# Patient Record
Sex: FEMALE | Race: OTHER | HISPANIC OR LATINO
[De-identification: names, ages, dates, MRNs, and addresses within clinical notes are randomized per-mention and may not be internally consistent; named-entity substitution may affect disease eponyms.]

---

## 2017-04-10 PROBLEM — Z00.00 ENCOUNTER FOR PREVENTIVE HEALTH EXAMINATION: Status: ACTIVE | Noted: 2017-04-10

## 2017-04-11 ENCOUNTER — APPOINTMENT (OUTPATIENT)
Age: 57
End: 2017-04-11

## 2017-04-11 RX ORDER — GAUZE BANDAGE 4" X 4"
SPONGE TOPICAL
Qty: 20 | Refills: 2 | Status: ACTIVE | COMMUNITY
Start: 2017-04-11 | End: 1900-01-01

## 2017-04-25 ENCOUNTER — APPOINTMENT (OUTPATIENT)
Dept: WOUND CARE | Facility: CLINIC | Age: 57
End: 2017-04-25

## 2017-04-25 ENCOUNTER — OUTPATIENT (OUTPATIENT)
Dept: OUTPATIENT SERVICES | Facility: HOSPITAL | Age: 57
LOS: 1 days | Discharge: HOME | End: 2017-04-25

## 2017-04-25 DIAGNOSIS — T24.209A BURN OF SECOND DEGREE OF UNSPECIFIED SITE OF UNSPECIFIED LOWER LIMB, EXCEPT ANKLE AND FOOT, INITIAL ENCOUNTER: ICD-10-CM
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
## 2017-06-28 DIAGNOSIS — T24.202D BURN OF SECOND DEGREE OF UNSPECIFIED SITE OF LEFT LOWER LIMB, EXCEPT ANKLE AND FOOT, SUBSEQUENT ENCOUNTER: ICD-10-CM

## 2017-06-28 DIAGNOSIS — G89.11 ACUTE PAIN DUE TO TRAUMA: ICD-10-CM

## 2018-05-22 ENCOUNTER — APPOINTMENT (OUTPATIENT)
Dept: SURGERY | Facility: CLINIC | Age: 58
End: 2018-05-22

## 2018-05-22 VITALS
BODY MASS INDEX: 25.76 KG/M2 | DIASTOLIC BLOOD PRESSURE: 68 MMHG | SYSTOLIC BLOOD PRESSURE: 112 MMHG | WEIGHT: 140 LBS | HEIGHT: 62 IN

## 2018-06-05 ENCOUNTER — APPOINTMENT (OUTPATIENT)
Dept: PLASTIC SURGERY | Facility: CLINIC | Age: 58
End: 2018-06-05
Payer: COMMERCIAL

## 2018-06-05 PROCEDURE — 99203 OFFICE O/P NEW LOW 30 MIN: CPT

## 2019-09-04 ENCOUNTER — OUTPATIENT (OUTPATIENT)
Dept: OUTPATIENT SERVICES | Facility: HOSPITAL | Age: 59
LOS: 1 days | Discharge: HOME | End: 2019-09-04
Payer: COMMERCIAL

## 2019-09-04 DIAGNOSIS — R00.2 PALPITATIONS: ICD-10-CM

## 2019-09-04 DIAGNOSIS — R07.9 CHEST PAIN, UNSPECIFIED: ICD-10-CM

## 2019-09-04 PROCEDURE — 93306 TTE W/DOPPLER COMPLETE: CPT | Mod: 26

## 2020-03-28 ENCOUNTER — TRANSCRIPTION ENCOUNTER (OUTPATIENT)
Age: 60
End: 2020-03-28

## 2020-03-30 ENCOUNTER — INPATIENT (INPATIENT)
Facility: HOSPITAL | Age: 60
LOS: 2 days | Discharge: HOME | End: 2020-04-02
Attending: STUDENT IN AN ORGANIZED HEALTH CARE EDUCATION/TRAINING PROGRAM | Admitting: STUDENT IN AN ORGANIZED HEALTH CARE EDUCATION/TRAINING PROGRAM
Payer: COMMERCIAL

## 2020-03-30 VITALS
SYSTOLIC BLOOD PRESSURE: 123 MMHG | DIASTOLIC BLOOD PRESSURE: 72 MMHG | OXYGEN SATURATION: 97 % | RESPIRATION RATE: 18 BRPM | TEMPERATURE: 99 F | HEART RATE: 96 BPM

## 2020-03-30 LAB
ALBUMIN SERPL ELPH-MCNC: 4.9 G/DL — SIGNIFICANT CHANGE UP (ref 3.5–5.2)
ALP SERPL-CCNC: 72 U/L — SIGNIFICANT CHANGE UP (ref 30–115)
ALT FLD-CCNC: 19 U/L — SIGNIFICANT CHANGE UP (ref 0–41)
ANION GAP SERPL CALC-SCNC: 15 MMOL/L — HIGH (ref 7–14)
APTT BLD: 31.7 SEC — SIGNIFICANT CHANGE UP (ref 27–39.2)
AST SERPL-CCNC: 28 U/L — SIGNIFICANT CHANGE UP (ref 0–41)
BASE EXCESS BLDV CALC-SCNC: 2.4 MMOL/L — HIGH (ref -2–2)
BASOPHILS # BLD AUTO: 0.01 K/UL — SIGNIFICANT CHANGE UP (ref 0–0.2)
BASOPHILS NFR BLD AUTO: 0.2 % — SIGNIFICANT CHANGE UP (ref 0–1)
BILIRUB SERPL-MCNC: 0.7 MG/DL — SIGNIFICANT CHANGE UP (ref 0.2–1.2)
BUN SERPL-MCNC: 10 MG/DL — SIGNIFICANT CHANGE UP (ref 10–20)
CA-I SERPL-SCNC: 1.22 MMOL/L — SIGNIFICANT CHANGE UP (ref 1.12–1.3)
CALCIUM SERPL-MCNC: 10.2 MG/DL — HIGH (ref 8.5–10.1)
CHLORIDE SERPL-SCNC: 101 MMOL/L — SIGNIFICANT CHANGE UP (ref 98–110)
CO2 SERPL-SCNC: 23 MMOL/L — SIGNIFICANT CHANGE UP (ref 17–32)
CREAT SERPL-MCNC: 0.7 MG/DL — SIGNIFICANT CHANGE UP (ref 0.7–1.5)
EOSINOPHIL # BLD AUTO: 0 K/UL — SIGNIFICANT CHANGE UP (ref 0–0.7)
EOSINOPHIL NFR BLD AUTO: 0 % — SIGNIFICANT CHANGE UP (ref 0–8)
FLU A RESULT: NEGATIVE — SIGNIFICANT CHANGE UP
FLU A RESULT: NEGATIVE — SIGNIFICANT CHANGE UP
FLUAV AG NPH QL: NEGATIVE — SIGNIFICANT CHANGE UP
FLUBV AG NPH QL: NEGATIVE — SIGNIFICANT CHANGE UP
GAS PNL BLDV: 141 MMOL/L — SIGNIFICANT CHANGE UP (ref 136–145)
GAS PNL BLDV: SIGNIFICANT CHANGE UP
GLUCOSE SERPL-MCNC: 97 MG/DL — SIGNIFICANT CHANGE UP (ref 70–99)
HCO3 BLDV-SCNC: 25 MMOL/L — SIGNIFICANT CHANGE UP (ref 22–29)
HCT VFR BLD CALC: 37.3 % — SIGNIFICANT CHANGE UP (ref 37–47)
HCT VFR BLDA CALC: 40.8 % — SIGNIFICANT CHANGE UP (ref 34–44)
HGB BLD CALC-MCNC: 13.3 G/DL — LOW (ref 14–18)
HGB BLD-MCNC: 12.9 G/DL — SIGNIFICANT CHANGE UP (ref 12–16)
IMM GRANULOCYTES NFR BLD AUTO: 0.2 % — SIGNIFICANT CHANGE UP (ref 0.1–0.3)
INR BLD: 1.14 RATIO — SIGNIFICANT CHANGE UP (ref 0.65–1.3)
LACTATE BLDV-MCNC: 1.5 MMOL/L — SIGNIFICANT CHANGE UP (ref 0.5–1.6)
LDH SERPL L TO P-CCNC: 158 — SIGNIFICANT CHANGE UP (ref 50–242)
LYMPHOCYTES # BLD AUTO: 1.01 K/UL — LOW (ref 1.2–3.4)
LYMPHOCYTES # BLD AUTO: 20.9 % — SIGNIFICANT CHANGE UP (ref 20.5–51.1)
MCHC RBC-ENTMCNC: 31.9 PG — HIGH (ref 27–31)
MCHC RBC-ENTMCNC: 34.6 G/DL — SIGNIFICANT CHANGE UP (ref 32–37)
MCV RBC AUTO: 92.3 FL — SIGNIFICANT CHANGE UP (ref 81–99)
MONOCYTES # BLD AUTO: 0.43 K/UL — SIGNIFICANT CHANGE UP (ref 0.1–0.6)
MONOCYTES NFR BLD AUTO: 8.9 % — SIGNIFICANT CHANGE UP (ref 1.7–9.3)
NEUTROPHILS # BLD AUTO: 3.37 K/UL — SIGNIFICANT CHANGE UP (ref 1.4–6.5)
NEUTROPHILS NFR BLD AUTO: 69.8 % — SIGNIFICANT CHANGE UP (ref 42.2–75.2)
NRBC # BLD: 0 /100 WBCS — SIGNIFICANT CHANGE UP (ref 0–0)
NT-PROBNP SERPL-SCNC: 49 PG/ML — SIGNIFICANT CHANGE UP (ref 0–300)
PCO2 BLDV: 32 MMHG — LOW (ref 41–51)
PH BLDV: 7.5 — HIGH (ref 7.26–7.43)
PLATELET # BLD AUTO: 254 K/UL — SIGNIFICANT CHANGE UP (ref 130–400)
PO2 BLDV: 21 MMHG — SIGNIFICANT CHANGE UP (ref 20–40)
POTASSIUM BLDV-SCNC: 3.7 MMOL/L — SIGNIFICANT CHANGE UP (ref 3.3–5.6)
POTASSIUM SERPL-MCNC: 4 MMOL/L — SIGNIFICANT CHANGE UP (ref 3.5–5)
POTASSIUM SERPL-SCNC: 4 MMOL/L — SIGNIFICANT CHANGE UP (ref 3.5–5)
PROT SERPL-MCNC: 7.7 G/DL — SIGNIFICANT CHANGE UP (ref 6–8)
PROTHROM AB SERPL-ACNC: 13.1 SEC — HIGH (ref 9.95–12.87)
RBC # BLD: 4.04 M/UL — LOW (ref 4.2–5.4)
RBC # FLD: 11.6 % — SIGNIFICANT CHANGE UP (ref 11.5–14.5)
RSV RESULT: NEGATIVE — SIGNIFICANT CHANGE UP
RSV RNA RESP QL NAA+PROBE: NEGATIVE — SIGNIFICANT CHANGE UP
SAO2 % BLDV: 39 % — SIGNIFICANT CHANGE UP
SODIUM SERPL-SCNC: 139 MMOL/L — SIGNIFICANT CHANGE UP (ref 135–146)
TROPONIN T SERPL-MCNC: <0.01 NG/ML — SIGNIFICANT CHANGE UP
WBC # BLD: 4.83 K/UL — SIGNIFICANT CHANGE UP (ref 4.8–10.8)
WBC # FLD AUTO: 4.83 K/UL — SIGNIFICANT CHANGE UP (ref 4.8–10.8)

## 2020-03-30 PROCEDURE — 71045 X-RAY EXAM CHEST 1 VIEW: CPT | Mod: 26

## 2020-03-30 PROCEDURE — 93010 ELECTROCARDIOGRAM REPORT: CPT

## 2020-03-30 PROCEDURE — 99285 EMERGENCY DEPT VISIT HI MDM: CPT

## 2020-03-30 RX ORDER — LEVOTHYROXINE SODIUM 125 MCG
112 TABLET ORAL DAILY
Refills: 0 | Status: DISCONTINUED | OUTPATIENT
Start: 2020-03-30 | End: 2020-04-02

## 2020-03-30 RX ORDER — ACETAMINOPHEN 500 MG
650 TABLET ORAL EVERY 4 HOURS
Refills: 0 | Status: DISCONTINUED | OUTPATIENT
Start: 2020-03-30 | End: 2020-03-31

## 2020-03-30 RX ORDER — AZITHROMYCIN 500 MG/1
500 TABLET, FILM COATED ORAL ONCE
Refills: 0 | Status: COMPLETED | OUTPATIENT
Start: 2020-03-30 | End: 2020-03-30

## 2020-03-30 RX ORDER — ACETAMINOPHEN 500 MG
975 TABLET ORAL ONCE
Refills: 0 | Status: COMPLETED | OUTPATIENT
Start: 2020-03-30 | End: 2020-03-30

## 2020-03-30 RX ORDER — SODIUM CHLORIDE 9 MG/ML
1000 INJECTION, SOLUTION INTRAVENOUS
Refills: 0 | Status: DISCONTINUED | OUTPATIENT
Start: 2020-03-30 | End: 2020-04-02

## 2020-03-30 RX ORDER — CEFTRIAXONE 500 MG/1
1000 INJECTION, POWDER, FOR SOLUTION INTRAMUSCULAR; INTRAVENOUS ONCE
Refills: 0 | Status: COMPLETED | OUTPATIENT
Start: 2020-03-30 | End: 2020-03-30

## 2020-03-30 RX ORDER — SODIUM CHLORIDE 9 MG/ML
2000 INJECTION, SOLUTION INTRAVENOUS ONCE
Refills: 0 | Status: COMPLETED | OUTPATIENT
Start: 2020-03-30 | End: 2020-03-30

## 2020-03-30 RX ORDER — ACETAMINOPHEN 500 MG
650 TABLET ORAL EVERY 4 HOURS
Refills: 0 | Status: DISCONTINUED | OUTPATIENT
Start: 2020-03-30 | End: 2020-04-02

## 2020-03-30 RX ADMIN — SODIUM CHLORIDE 75 MILLILITER(S): 9 INJECTION, SOLUTION INTRAVENOUS at 20:24

## 2020-03-30 RX ADMIN — CEFTRIAXONE 100 MILLIGRAM(S): 500 INJECTION, POWDER, FOR SOLUTION INTRAMUSCULAR; INTRAVENOUS at 17:57

## 2020-03-30 RX ADMIN — SODIUM CHLORIDE 2000 MILLILITER(S): 9 INJECTION, SOLUTION INTRAVENOUS at 15:06

## 2020-03-30 RX ADMIN — AZITHROMYCIN 255 MILLIGRAM(S): 500 TABLET, FILM COATED ORAL at 18:42

## 2020-03-30 NOTE — ED PROVIDER NOTE - OBJECTIVE STATEMENT
59 year old female hx lupus presenting with shortness of breath x 2 days. Patient admits to fevers, chills, myalgias, cough, sore throat, intermittent nausea. Sxs mild to moderate, worsening, no pall/prov factors. She also admits to chest pain which radiates to he rback, worse with lying down. Denies abd pain, v/d, back pain. + covid contacts - mother and sister. No recent travel. [place] [facility] [place] earlier today for o2 sat 96% and increased wob. nonsmoker

## 2020-03-30 NOTE — H&P ADULT - ASSESSMENT
================= ASSESSMENT/PLAN ==================  Patient is a 59y old Female who presents with a chief complaint of Currently admitted to medicine with the primary diagnosis of CAP (community acquired pneumonia)    # Shortness of breath cough consistent with pneumonia   Patient has features consistent with bacterial (high fevers, possible consolidation on x ray, pleuritic chest pain) as well as viral pneumonia (no WBC, dry cough, long duration, no improvement with OP antibiotics)   Patient was already given ceftriaxone and azithromycin   - Follow up SARS COV 2 PCR   - Supportive measures (O2 as needed, tylenol, fluids)   - Check procalcitonin and HOLD antibiotics for now   - Repeat CXR in AM     # Atypical chest pain - Pleuritic likely secondary to cough and pneumonia   PLAN    #  PLAN    #  PLAN    #  PLAN    #  PLAN  #    GI PPX:   DVT PPX:     DIET:  ACTIVITY:  () Ad Cassy  /  () Advance as Tolerated  /  () Bed Rest  /  () Fall Precaution  /  () Seizure precaution    ================= PRESENT TODAY ==================    1-Collazo Catheter:  Indication:  2-Vascular Access:  []Peripheral | Indication:  []Midline | Indication:   []PICC Line | Indication:  []CVC | Indication:  []Arterial Line | Indication:  []Uldall Catheter | Indication:   3-IV Fluids: | Indication:  4-Ventilation: | Sat:     ================= DISPOSITION ==================    Patient to be discharged when condition(s) optimized.            Discharge to:              Home services:              Counseled on/Discussed cessation of:  () Risky behaviors  /  () Smoking cessation  /  () Diet  /  () Exercise  /  () Other    ================= CODE STATUS =================                  () FULL CODE     |     () DNR     |     () DNI    () Discussion with patient and/or family regarding goals of care ================= ASSESSMENT/PLAN ==================  Patient is a 59y old Female who presents with a chief complaint of Currently admitted to medicine with the primary diagnosis of CAP (community acquired pneumonia)    # Shortness of breath cough consistent with pneumonia   Patient has features consistent with bacterial (high fevers, possible consolidation on x ray, pleuritic chest pain) as well as viral pneumonia (no WBC, dry cough, long duration, no improvement with OP antibiotics)   Patient was already given ceftriaxone and azithromycin   - Follow up SARS COV 2 PCR   - Supportive measures (O2 as needed, tylenol, fluids)   - Check procalcitonin and HOLD antibiotics for now   - Repeat CXR in AM     # Atypical chest pain - Pleuritic likely secondary to cough and pneumonia   BNP WNL, troponin negative, EKG without ischemic changes     # SLE - Controlled   Patient on plaquenil does not remember dose but took her dose for 3/30/2020  - Please confirm her dose (she will attempt to get it) and continue     # Hypothyroidism - Controlled   - Continue synthroid     GI PPX: -   DVT PPX: Lovenox      DIET: Regular   ACTIVITY:  () Ad Cassy  /  (X) Advance as Tolerated  /  () Bed Rest  /  () Fall Precaution  /  () Seizure precaution    ================= DISPOSITION ==================    Patient to be discharged when condition(s) optimized.            Discharge to: Home when cleared             Home services:              Counseled on/Discussed cessation of:  () Risky behaviors  /  () Smoking cessation  /  () Diet  /  () Exercise  /  () Other    ================= CODE STATUS =================                  (X) FULL CODE     |     () DNR     |     () DNI    () Discussion with patient and/or family regarding goals of care

## 2020-03-30 NOTE — H&P ADULT - NSHPLABSRESULTS_GEN_ALL_CORE
===================== LABS =====================                        12.9   4.83  )-----------( 254      ( 30 Mar 2020 14:54 )             37.3     03-30    139  |  101  |  10  ----------------------------<  97  4.0   |  23  |  0.7    Ca    10.2<H>      30 Mar 2020 14:54    TPro  7.7  /  Alb  4.9  /  TBili  0.7  /  DBili  x   /  AST  28  /  ALT  19  /  AlkPhos  72  03-30    PT/INR - ( 30 Mar 2020 14:54 )   PT: 13.10 sec;   INR: 1.14 ratio         PTT - ( 30 Mar 2020 14:54 )  PTT:31.7 sec      Troponin T, Serum: <0.01 ng/mL (03-30-20 @ 14:54)    CARDIAC MARKERS ( 30 Mar 2020 14:54 )  x     / <0.01 ng/mL / x     / x     / x        ================== IMAGING ==================    < from: Xray Chest 1 View-PORTABLE IMMEDIATE (03.30.20 @ 16:49) >    Impression:      Compared with the previous chest x-ray of 11/3/2010, a linear parenchymal opacity is seen in the left midlung field, overlying the region of the third anterior rib interspace. An early infiltrate cannot be excluded.    ================== EKG ==================

## 2020-03-30 NOTE — ED PROVIDER NOTE - CLINICAL SUMMARY MEDICAL DECISION MAKING FREE TEXT BOX
pt found to have L opacity on CXR, failed outpt treatment 2 oral abx, with inc WOB in ED, ill appearing, will admit for iv abx, further treatment.

## 2020-03-30 NOTE — ED PROVIDER NOTE - CARE PLAN
Principal Discharge DX:	Dyspnea  Secondary Diagnosis:	Viral illness Principal Discharge DX:	CAP (community acquired pneumonia)  Secondary Diagnosis:	Viral illness  Secondary Diagnosis:	Failure of outpatient treatment

## 2020-03-30 NOTE — ED PROVIDER NOTE - NS ED ROS FT
Review of Systems         Constitutional: See HPI       EENT: (-) visual changes (-) sore throat (-) congestion       Cardiovascular: (-) chest pain (-) syncope       Respiratory: See HPI       Gastrointestinal: (-) abdominal pain (-) vomiting (-) diarrhea (+) nausea (-) constipation       Genitourinary: (-) dysuria        Musculoskeletal: (-) neck pain (-) back pain (-) joint pain       Integumentary: (-) rash       Neurological: (-) headache (-) altered mental status (-) dizziness (-) paresthesias       Psych: (-) psych history

## 2020-03-30 NOTE — H&P ADULT - ATTENDING COMMENTS
A 58 yo female with PMH of Hypothyroidism and SLE came to ED c/o worsening cough and fever. Symptoms started 3 weeks ago with dry cough, muscle aches and fatigue. She was treated with Augmentin. Symptoms continued, for the last few days she has fever, chills and left sided pain. She visited the urgent care and advised to come to ED. She also reports mild SOB. No abdominal pain or urinary symptoms. her other was recently diagnosed with COVID-19. In the ED, BP was 123/72, HR was 96, SpO2 was 97 on room air, Temp was 37.1. Labs did not show WBC but showed lymphopenia, normal LFTs. FLU was negative. CXR showed a possible left middle lobe opacity. She was given ceftriaxone and azithromycin     PHYSICAL EXAM:  GENERAL: NAD, well-developed  HEAD:  Atraumatic, Normocephalic  EYES: EOMI, PERRLA, conjunctiva and sclera clear  NECK: Supple, No JVD  CHEST/LUNG: left lower lung crackles.   HEART: Regular rate and rhythm; S1 S2  ABDOMEN: Soft, Nontender, Nondistended; Bowel sounds present  EXTREMITIES:  2+ Peripheral Pulses, No clubbing, cyanosis, or edema  PSYCH: AAOx3  NEUROLOGY: non-focal  SKIN: No rashes or lesions    A/P:   Pneumonia: possibly viral.   Fever, SOB and cough,.   CXR showed mid lung opacity.   Influenza is negative.   Pending COVID-19 result.   Start on Plaquenil 400mg BID for one day then 200mg BID (she takes at home for SLE).     SLE: stable. Continue Plaquenil.

## 2020-03-30 NOTE — ED PROVIDER NOTE - ATTENDING CONTRIBUTION TO CARE
59F PMH lupus on Plaquenil, hypothyroid on synthroid, p/w 2 week fever, dry cough and SOB since last night. mother has +COVID and is admitted. c/o L chest pain sharp intermittent radiates to scapula when coughing. pt seen at  and sent to ED. states she was given amoxicillin by pmd 2 weeks ago when symptoms started, started on a zpack at urgent care on friday, today would be day 4. pt states her pulse ox at home this morning was 94% RA. no le edema ,le pain, immobilization, hormones, hemoptysis.     on exam, FVSS, well kellie nad, ncat, eomi, perrla, mmm, tachypneic w inc wob but not hypoxic, LLL crackles, no wheezing, rrr nl s1s2 no mrg, abd soft ntnd, aaox3, no focal deficits, no le edema or calf ttp,     a/p; Likely covid/pna, will do labs, ekg/trop, CXR, send viral testing, will need admission for inc WOB. 59F PMH lupus on Plaquenil, hypothyroid on synthroid, p/w 2 week fever, dry cough and SOB since last night. mother has +COVID and is admitted. c/o L chest pain sharp intermittent radiates to scapula when coughing. pt seen at  and sent to ED. states she was given amoxicillin by pmd 2 weeks ago when symptoms started, started on a zpack at urgent care on friday, today would be day 4. pt states her pulse ox at home this morning was 94% RA. no le edema ,le pain, immobilization, hormones, hemoptysis.     on exam, AFVSS, well kellie nad, ncat, eomi, perrla, mmm, tachypneic w inc wob but not hypoxic, LLL crackles, no wheezing, rrr nl s1s2 no mrg, abd soft ntnd, aaox3, no focal deficits, no le edema or calf ttp,     a/p; Likely covid/pna, will do labs, ekg/trop, CXR, send viral testing, will need admission for inc WOB.

## 2020-03-30 NOTE — ED ADULT TRIAGE NOTE - CHIEF COMPLAINT QUOTE
pt biba for fever, cough and sob, t max temp 104. c/o body aches. pt biba for fever, cough for one week  with sob today/ , t max temp 104. c/o body aches.

## 2020-03-30 NOTE — H&P ADULT - HISTORY OF PRESENT ILLNESS
[59 year old woman]    CC: Shortness of breath and weakness     PMH: SLE on plaquenil     Past Surgical History: Denies     History of Present Illness goes back to the past 2 weeks when the patient developed a dry cough associated with severe malaise, diffuse myalgias and frontal headaches. She also endorses fevers and chills up to 102. She intially took a full course of augmentin with minimal improvement. 4 days prior to admission she went to urgent care and was given a course to zithromax of which she took 4 days also with minimal improvement. Two days prior to presentation, she started developing chest pain, mid substernal radiating to her back worse with coughs and deep inspirations. On the day of presentation she went back to urgent care and was referred to the ED due to labored breathing.   She endorses her mother is COVID 19 and hospitalized however she has not seen her in 2 weeks. She does not think she was in contact with anyone positive otherwise however she does use the public transit for transportation.     In the ED, BP was 123/72, HR was 96, SpO2 was 97 on room air, Temp was 37.1. Labs did not show WBC but showed lymphopenia, normal LFTs. FLU was negative. CXR showed a possible left middle lobe opacity. She was given ceftriaxone and azithromycin

## 2020-03-30 NOTE — H&P ADULT - RS GEN PE MLT RESP DETAILS PC
no wheezes/no rales/breath sounds equal/good air movement/no chest wall tenderness/no rhonchi/respirations non-labored/clear to auscultation bilaterally

## 2020-03-30 NOTE — H&P ADULT - NEGATIVE CARDIOVASCULAR SYMPTOMS
no peripheral edema/no palpitations/no dyspnea on exertion/no orthopnea/no paroxysmal nocturnal dyspnea

## 2020-03-30 NOTE — ED PROVIDER NOTE - PHYSICAL EXAMINATION
Physical Exam    Vital Signs: I have reviewed the initial vital signs  Constitutional: well-nourished, appears stated age, no acute distress  EENT: Conjunctiva pink, Sclera clear, PERRLA, EOMI. Mucous membranes moist, no exudates or lesions noted, uvula midline.  Cardiovascular: S1 and S2 present, regular rate, regular rhythm. Well perfused extremities, no peripheral evelyne  Respiratory: left lower lobe crackles, tacypneic, 96% on RA. no rhonchi or wheezing  Gastrointestinal: soft, non-tender abdomen. No guarding or rebound tenderness  Musculoskeletal: supple nontender neck, no midline tenderness, no joint pain  Integumentary: warm, dry, no rash  Psychiatric: appropriate mood, appropriate affect

## 2020-03-31 LAB
ALBUMIN SERPL ELPH-MCNC: 4.1 G/DL — SIGNIFICANT CHANGE UP (ref 3.5–5.2)
ALP SERPL-CCNC: 56 U/L — SIGNIFICANT CHANGE UP (ref 30–115)
ALT FLD-CCNC: 15 U/L — SIGNIFICANT CHANGE UP (ref 0–41)
ANION GAP SERPL CALC-SCNC: 14 MMOL/L — SIGNIFICANT CHANGE UP (ref 7–14)
AST SERPL-CCNC: 24 U/L — SIGNIFICANT CHANGE UP (ref 0–41)
BASOPHILS # BLD AUTO: 0.02 K/UL — SIGNIFICANT CHANGE UP (ref 0–0.2)
BASOPHILS NFR BLD AUTO: 0.7 % — SIGNIFICANT CHANGE UP (ref 0–1)
BILIRUB SERPL-MCNC: 0.5 MG/DL — SIGNIFICANT CHANGE UP (ref 0.2–1.2)
BUN SERPL-MCNC: 7 MG/DL — LOW (ref 10–20)
CALCIUM SERPL-MCNC: 9 MG/DL — SIGNIFICANT CHANGE UP (ref 8.5–10.1)
CHLORIDE SERPL-SCNC: 106 MMOL/L — SIGNIFICANT CHANGE UP (ref 98–110)
CO2 SERPL-SCNC: 23 MMOL/L — SIGNIFICANT CHANGE UP (ref 17–32)
CREAT SERPL-MCNC: 0.5 MG/DL — LOW (ref 0.7–1.5)
CRP SERPL-MCNC: 1.82 MG/DL — HIGH (ref 0–0.4)
EOSINOPHIL # BLD AUTO: 0 K/UL — SIGNIFICANT CHANGE UP (ref 0–0.7)
EOSINOPHIL NFR BLD AUTO: 0 % — SIGNIFICANT CHANGE UP (ref 0–8)
GLUCOSE SERPL-MCNC: 89 MG/DL — SIGNIFICANT CHANGE UP (ref 70–99)
HCT VFR BLD CALC: 33.3 % — LOW (ref 37–47)
HCV AB S/CO SERPL IA: 0.04 COI — SIGNIFICANT CHANGE UP
HCV AB SERPL-IMP: SIGNIFICANT CHANGE UP
HGB BLD-MCNC: 11.1 G/DL — LOW (ref 12–16)
IMM GRANULOCYTES NFR BLD AUTO: 0.3 % — SIGNIFICANT CHANGE UP (ref 0.1–0.3)
LDH SERPL L TO P-CCNC: 155 — SIGNIFICANT CHANGE UP (ref 50–242)
LYMPHOCYTES # BLD AUTO: 1.24 K/UL — SIGNIFICANT CHANGE UP (ref 1.2–3.4)
LYMPHOCYTES # BLD AUTO: 41.8 % — SIGNIFICANT CHANGE UP (ref 20.5–51.1)
MAGNESIUM SERPL-MCNC: 2 MG/DL — SIGNIFICANT CHANGE UP (ref 1.8–2.4)
MCHC RBC-ENTMCNC: 31.1 PG — HIGH (ref 27–31)
MCHC RBC-ENTMCNC: 33.3 G/DL — SIGNIFICANT CHANGE UP (ref 32–37)
MCV RBC AUTO: 93.3 FL — SIGNIFICANT CHANGE UP (ref 81–99)
MONOCYTES # BLD AUTO: 0.41 K/UL — SIGNIFICANT CHANGE UP (ref 0.1–0.6)
MONOCYTES NFR BLD AUTO: 13.8 % — HIGH (ref 1.7–9.3)
NEUTROPHILS # BLD AUTO: 1.29 K/UL — LOW (ref 1.4–6.5)
NEUTROPHILS NFR BLD AUTO: 43.4 % — SIGNIFICANT CHANGE UP (ref 42.2–75.2)
NRBC # BLD: 0 /100 WBCS — SIGNIFICANT CHANGE UP (ref 0–0)
PHOSPHATE SERPL-MCNC: 3.7 MG/DL — SIGNIFICANT CHANGE UP (ref 2.1–4.9)
PLATELET # BLD AUTO: 211 K/UL — SIGNIFICANT CHANGE UP (ref 130–400)
POTASSIUM SERPL-MCNC: 3.9 MMOL/L — SIGNIFICANT CHANGE UP (ref 3.5–5)
POTASSIUM SERPL-SCNC: 3.9 MMOL/L — SIGNIFICANT CHANGE UP (ref 3.5–5)
PROCALCITONIN SERPL-MCNC: 0.07 NG/ML — SIGNIFICANT CHANGE UP (ref 0.02–0.1)
PROT SERPL-MCNC: 6.6 G/DL — SIGNIFICANT CHANGE UP (ref 6–8)
RBC # BLD: 3.57 M/UL — LOW (ref 4.2–5.4)
RBC # FLD: 11.7 % — SIGNIFICANT CHANGE UP (ref 11.5–14.5)
SODIUM SERPL-SCNC: 143 MMOL/L — SIGNIFICANT CHANGE UP (ref 135–146)
WBC # BLD: 2.97 K/UL — LOW (ref 4.8–10.8)
WBC # FLD AUTO: 2.97 K/UL — LOW (ref 4.8–10.8)

## 2020-03-31 PROCEDURE — 71045 X-RAY EXAM CHEST 1 VIEW: CPT | Mod: 26

## 2020-03-31 PROCEDURE — 99223 1ST HOSP IP/OBS HIGH 75: CPT | Mod: AI

## 2020-03-31 RX ORDER — HYDROXYCHLOROQUINE SULFATE 200 MG
TABLET ORAL
Refills: 0 | Status: DISCONTINUED | OUTPATIENT
Start: 2020-03-31 | End: 2020-04-02

## 2020-03-31 RX ORDER — ACETAMINOPHEN 500 MG
650 TABLET ORAL EVERY 4 HOURS
Refills: 0 | Status: DISCONTINUED | OUTPATIENT
Start: 2020-03-31 | End: 2020-04-02

## 2020-03-31 RX ORDER — HYDROXYCHLOROQUINE SULFATE 200 MG
200 TABLET ORAL EVERY 12 HOURS
Refills: 0 | Status: DISCONTINUED | OUTPATIENT
Start: 2020-04-01 | End: 2020-04-02

## 2020-03-31 RX ORDER — HYDROXYCHLOROQUINE SULFATE 200 MG
400 TABLET ORAL EVERY 12 HOURS
Refills: 0 | Status: COMPLETED | OUTPATIENT
Start: 2020-03-31 | End: 2020-04-01

## 2020-03-31 RX ADMIN — Medication 112 MICROGRAM(S): at 06:48

## 2020-03-31 RX ADMIN — Medication 650 MILLIGRAM(S): at 20:38

## 2020-03-31 RX ADMIN — Medication 650 MILLIGRAM(S): at 16:34

## 2020-03-31 RX ADMIN — Medication 400 MILLIGRAM(S): at 16:33

## 2020-03-31 NOTE — PROGRESS NOTE ADULT - SUBJECTIVE AND OBJECTIVE BOX
Review of Systems:  · General Symptoms	fever; chills; malaise; fatigue; weakness	  · Skin/Breast	not applicable	  · Ophthalmologic	not applicable	  · ENMT	not applicable	  · Negative Respiratory and Thorax Symptoms	no wheezing; no dyspnea; no hemoptysis	  · Respiratory and Thorax Symptoms	dyspnea  cough  pleuritic chest pain	  · Negative Cardiovascular Symptoms	no palpitations; no dyspnea on exertion; no orthopnea; no paroxysmal nocturnal dyspnea; no peripheral edema	  · Cardiovascular Symptoms	chest pain	  · Gastrointestinal	negative	  · Genitourinary	negative	  · Musculoskeletal	negative	  · Neurological	negative	        Physical Exam:    Physical Exam:  · Constitutional	detailed exam	  · Constitutional Details	well-developed; well-groomed; well-nourished; no distress; respiratory distress	  · Respiratory Distress	mild	  · Eyes	not examined	  · ENMT	not examined	  · Neck	not examined	  · Breasts	not examined	  · Back	not examined	  · Respiratory	detailed exam	  · Respiratory Details	breath sounds equal; good air movement; respirations non-labored; clear to auscultation bilaterally; no chest wall tenderness; no rales; no rhonchi; no wheezes	  · Cardiovascular	detailed exam	  · Cardiovascular Details	regular rate and rhythm	  · Cardiovascular Details	positive S1; positive S2	  · Gastrointestinal	Soft, non-tender, no hepatosplenomegaly, normal bowel sounds	  		  · Extremities	No cyanosis, clubbing or edema	  · Vascular	Equal and normal pulses (carotid, femoral, dorsalis pedis)	       Labs and Results:  Labs, Radiology, Cardiology, and Other Results: ===================== LABS =====================                        11.1  2.97)---------( 254      ( 31 Mar 2020 07:30 )             33.3    03-31   143  |  106  |  7  ----------------------------<  97  3.9   |  23  |  0.5   Ca    9     31 Mar 2020 07:30  AST  25  /  ALT  15  /  AlkPhos  56  03-31   PT/INR - ( 30 Mar 2020 1454)   PT: 13.10 sec;   INR: 1.14 ratio        PTT - ( 30 Mar 2020 14:54 )  PTT:31.7 sec    Troponin T, Serum: <0.01 ng/mL (03-30-20 @ 14:54)   CARDIAC MARKERS ( 30 Mar 2020 14:54 )  x     / <0.01 ng/mL / x     / x     / x       ================== IMAGING ==================   < from: Xray Chest 1 View-PORTABLE IMMEDIATE (03.30.20 @ 16:49) >   Impression:     Compared with the previous chest x-ray of 11/3/2010, a linear parenchymal opacity is seen in the left midlung field, overlying the region of the third anterior rib interspace. An early infiltrate cannot be excluded.   ================== EKG ================== Review of Systems:  · General Symptoms	fever; chills; malaise; fatigue; weakness	  · Skin/Breast	not applicable	  · Ophthalmologic	not applicable	  · ENMT	not applicable	  · Negative Respiratory and Thorax Symptoms	no wheezing; no dyspnea; no hemoptysis	  · Respiratory and Thorax Symptoms	  cough  pleuritic chest pain	  · Negative Cardiovascular Symptoms	no palpitations; no dyspnea on exertion; no orthopnea; no paroxysmal nocturnal dyspnea; no peripheral edema	  · Cardiovascular Symptoms	chest pain	  · Gastrointestinal	negative	  · Genitourinary	negative	  · Musculoskeletal	negative	  · Neurological	negative	        Physical Exam:    Physical Exam:  · Constitutional	detailed exam	  · Constitutional Details	well-developed; well-groomed; well-nourished; no distress;	  · Respiratory Distress	mild	  · Eyes	not examined	  · ENMT	not examined	  · Neck	not examined	  · Breasts	not examined	  · Back	not examined	  · Respiratory	detailed exam	  · Respiratory Details	breath sounds equal; good air movement; respirations non-labored; clear to auscultation bilaterally; no chest wall tenderness; no rales; no rhonchi; no wheezes	  · Cardiovascular	detailed exam	  · Cardiovascular Details	regular rate and rhythm	  · Cardiovascular Details	positive S1; positive S2	  · Gastrointestinal	Soft, non-tender, no hepatosplenomegaly, normal bowel sounds	  		  · Extremities	No cyanosis, clubbing or edema	  · Vascular	Equal and normal pulses (carotid, femoral, dorsalis pedis)	       Labs and Results:  Labs, Radiology, Cardiology, and Other Results: ===================== LABS =====================                        11.1  2.97)---------( 254      ( 31 Mar 2020 07:30 )             33.3    03-31   143  |  106  |  7  ----------------------------<  97  3.9   |  23  |  0.5   Ca    9     31 Mar 2020 07:30  AST  25  /  ALT  15  /  AlkPhos  56  03-31   PT/INR - ( 30 Mar 2020 1454)   PT: 13.10 sec;   INR: 1.14 ratio        PTT - ( 30 Mar 2020 14:54 )  PTT:31.7 sec    Troponin T, Serum: <0.01 ng/mL (03-30-20 @ 14:54)   CARDIAC MARKERS ( 30 Mar 2020 14:54 )  x     / <0.01 ng/mL / x     / x     / x       ================== IMAGING ==================   < from: Xray Chest 1 View-PORTABLE IMMEDIATE (03.30.20 @ 16:49) >   Impression:     Compared with the previous chest x-ray of 11/3/2010, a linear parenchymal opacity is seen in the left midlung field, overlying the region of the third anterior rib interspace. An early infiltrate cannot be excluded.   ================== EKG ==================

## 2020-03-31 NOTE — PROGRESS NOTE ADULT - ASSESSMENT
================= CODE STATUS =================                  (X) FULL CODE     |     () DNR     |     () DNI No significant improvement / TMAx of 102F in last 24 hours.  SPO2 decreasing from 96% NC O2  advise prone positioning 4 hours on 4hours off if patient can tolerate.   c/w plaquenil/repeat EKG today and daily to monitor QTc.   c /w azithromycin.   monitor electrolytes/ keep mag >2, K >4.   tylenol prn for fever.   fu CRP / procalcitonin.     dvt ppx      ================= CODE STATUS =================                  (X) FULL CODE     |     () DNR     |     () DNI

## 2020-04-01 LAB
ALBUMIN SERPL ELPH-MCNC: 4 G/DL — SIGNIFICANT CHANGE UP (ref 3.5–5.2)
ALP SERPL-CCNC: 55 U/L — SIGNIFICANT CHANGE UP (ref 30–115)
ALT FLD-CCNC: 15 U/L — SIGNIFICANT CHANGE UP (ref 0–41)
ANION GAP SERPL CALC-SCNC: 14 MMOL/L — SIGNIFICANT CHANGE UP (ref 7–14)
AST SERPL-CCNC: 24 U/L — SIGNIFICANT CHANGE UP (ref 0–41)
BASOPHILS # BLD AUTO: 0.01 K/UL — SIGNIFICANT CHANGE UP (ref 0–0.2)
BASOPHILS NFR BLD AUTO: 0.3 % — SIGNIFICANT CHANGE UP (ref 0–1)
BILIRUB SERPL-MCNC: 0.4 MG/DL — SIGNIFICANT CHANGE UP (ref 0.2–1.2)
BUN SERPL-MCNC: 7 MG/DL — LOW (ref 10–20)
CALCIUM SERPL-MCNC: 8.8 MG/DL — SIGNIFICANT CHANGE UP (ref 8.5–10.1)
CHLORIDE SERPL-SCNC: 103 MMOL/L — SIGNIFICANT CHANGE UP (ref 98–110)
CO2 SERPL-SCNC: 23 MMOL/L — SIGNIFICANT CHANGE UP (ref 17–32)
CREAT SERPL-MCNC: 0.5 MG/DL — LOW (ref 0.7–1.5)
CRP SERPL-MCNC: 2.59 MG/DL — HIGH (ref 0–0.4)
DSDNA AB SER-ACNC: 36 IU/ML — HIGH
EOSINOPHIL # BLD AUTO: 0 K/UL — SIGNIFICANT CHANGE UP (ref 0–0.7)
EOSINOPHIL NFR BLD AUTO: 0 % — SIGNIFICANT CHANGE UP (ref 0–8)
GLUCOSE SERPL-MCNC: 81 MG/DL — SIGNIFICANT CHANGE UP (ref 70–99)
HCT VFR BLD CALC: 32.9 % — LOW (ref 37–47)
HGB BLD-MCNC: 11.2 G/DL — LOW (ref 12–16)
IMM GRANULOCYTES NFR BLD AUTO: 0.3 % — SIGNIFICANT CHANGE UP (ref 0.1–0.3)
LYMPHOCYTES # BLD AUTO: 0.85 K/UL — LOW (ref 1.2–3.4)
LYMPHOCYTES # BLD AUTO: 26.7 % — SIGNIFICANT CHANGE UP (ref 20.5–51.1)
MCHC RBC-ENTMCNC: 31.8 PG — HIGH (ref 27–31)
MCHC RBC-ENTMCNC: 34 G/DL — SIGNIFICANT CHANGE UP (ref 32–37)
MCV RBC AUTO: 93.5 FL — SIGNIFICANT CHANGE UP (ref 81–99)
MONOCYTES # BLD AUTO: 0.29 K/UL — SIGNIFICANT CHANGE UP (ref 0.1–0.6)
MONOCYTES NFR BLD AUTO: 9.1 % — SIGNIFICANT CHANGE UP (ref 1.7–9.3)
NEUTROPHILS # BLD AUTO: 2.02 K/UL — SIGNIFICANT CHANGE UP (ref 1.4–6.5)
NEUTROPHILS NFR BLD AUTO: 63.6 % — SIGNIFICANT CHANGE UP (ref 42.2–75.2)
NRBC # BLD: 0 /100 WBCS — SIGNIFICANT CHANGE UP (ref 0–0)
PLATELET # BLD AUTO: 205 K/UL — SIGNIFICANT CHANGE UP (ref 130–400)
POTASSIUM SERPL-MCNC: 3.9 MMOL/L — SIGNIFICANT CHANGE UP (ref 3.5–5)
POTASSIUM SERPL-SCNC: 3.9 MMOL/L — SIGNIFICANT CHANGE UP (ref 3.5–5)
PROCALCITONIN SERPL-MCNC: 0.06 NG/ML — SIGNIFICANT CHANGE UP (ref 0.02–0.1)
PROCALCITONIN SERPL-MCNC: 0.07 NG/ML — SIGNIFICANT CHANGE UP (ref 0.02–0.1)
PROT SERPL-MCNC: 6.3 G/DL — SIGNIFICANT CHANGE UP (ref 6–8)
RBC # BLD: 3.52 M/UL — LOW (ref 4.2–5.4)
RBC # FLD: 11.5 % — SIGNIFICANT CHANGE UP (ref 11.5–14.5)
SARS-COV-2 RNA SPEC QL NAA+PROBE: SIGNIFICANT CHANGE UP
SODIUM SERPL-SCNC: 140 MMOL/L — SIGNIFICANT CHANGE UP (ref 135–146)
WBC # BLD: 3.18 K/UL — LOW (ref 4.8–10.8)
WBC # FLD AUTO: 3.18 K/UL — LOW (ref 4.8–10.8)

## 2020-04-01 PROCEDURE — 93010 ELECTROCARDIOGRAM REPORT: CPT

## 2020-04-01 PROCEDURE — 99233 SBSQ HOSP IP/OBS HIGH 50: CPT

## 2020-04-01 RX ORDER — ONDANSETRON 8 MG/1
4 TABLET, FILM COATED ORAL ONCE
Refills: 0 | Status: COMPLETED | OUTPATIENT
Start: 2020-04-01 | End: 2020-04-01

## 2020-04-01 RX ADMIN — ONDANSETRON 4 MILLIGRAM(S): 8 TABLET, FILM COATED ORAL at 21:52

## 2020-04-01 RX ADMIN — Medication 112 MICROGRAM(S): at 05:42

## 2020-04-01 RX ADMIN — Medication 650 MILLIGRAM(S): at 04:40

## 2020-04-01 RX ADMIN — Medication 650 MILLIGRAM(S): at 12:24

## 2020-04-01 RX ADMIN — Medication 650 MILLIGRAM(S): at 23:12

## 2020-04-01 RX ADMIN — SODIUM CHLORIDE 75 MILLILITER(S): 9 INJECTION, SOLUTION INTRAVENOUS at 21:19

## 2020-04-01 RX ADMIN — Medication 400 MILLIGRAM(S): at 00:43

## 2020-04-01 RX ADMIN — Medication 200 MILLIGRAM(S): at 12:05

## 2020-04-01 RX ADMIN — Medication 650 MILLIGRAM(S): at 18:25

## 2020-04-01 NOTE — CONSULT NOTE ADULT - SUBJECTIVE AND OBJECTIVE BOX
KEVIN SWIFT  MRN-[id number]    HISTORY OF PRESENT ILLNESS:  HPI:  [59 year old woman]    CC: Shortness of breath and weakness     PMH: SLE on plaquenil     Past Surgical History: Denies     History of Present Illness goes back to the past 2 weeks when the patient developed a dry cough associated with severe malaise, diffuse myalgias and frontal headaches. She also endorses fevers and chills up to 102. She intially took a full course of augmentin with minimal improvement. 4 days prior to admission she went to urgent care and was given a course to zithromax of which she took 4 days also with minimal improvement. Two days prior to presentation, she started developing chest pain, mid substernal radiating to her back worse with coughs and deep inspirations. On the day of presentation she went back to urgent care and was referred to the ED due to labored breathing./ shortness of breath  She endorses her mother is COVID 19 and hospitalized however she has not seen her in 2 weeks. She does not think she was in contact with anyone positive otherwise however she does use the public transit for transportation.     In the ED, BP was 123/72, HR was 96, SpO2 was 97 on room air, Temp was 37.1. Labs did not show WBC but showed lymphopenia, normal LFTs. FLU was negative. CXR showed a possible left middle lobe opacity. She was given ceftriaxone and azithromycin (30 Mar 2020 18:54)      PMH/PSH:  PAST MEDICAL & SURGICAL HISTORY:  Lupus  No significant past surgical history    ALLERGIES:  Allergies    No Known Allergies    Intolerances      SOCIAL HABITS:  negative x 3    FAMILY HISTORY:   n/c      REVIEW OF SYSTEM:  Elements of review of systems are negative or non-applicable except as noted above in HPI section.       HOME MEDICATIONS:  SYNTHROID    TAB 112MCG    MEDICATIONS:  MEDICATIONS  (STANDING):  hydroxychloroquine   Oral   hydroxychloroquine 200 milliGRAM(s) Oral every 12 hours  lactated ringers. 1000 milliLiter(s) (75 mL/Hr) IV Continuous <Continuous>  levothyroxine  Oral Tab/Cap - Peds 112 MICROGram(s) Oral daily    MEDICATIONS  (PRN):  acetaminophen   Tablet .. 650 milliGRAM(s) Oral every 4 hours PRN Temp greater or equal to 38.5C (101.3F)  acetaminophen  Suppository .. 650 milliGRAM(s) Rectal every 4 hours PRN Temp greater or equal to 38C (100.4F)        VITALS:   Vital Signs Last 24 Hrs  T(C): 38.7 (01 Apr 2020 22:22), Max: 39.2 (01 Apr 2020 21:06)  T(F): 101.6 (01 Apr 2020 22:22), Max: 102.6 (01 Apr 2020 21:06)  HR: 95 (01 Apr 2020 21:06) (84 - 95)  BP: 135/66 (01 Apr 2020 21:06) (110/55 - 135/66)  BP(mean): --  RR: 19 (01 Apr 2020 21:06) (19 - 20)  SpO2: 98% (01 Apr 2020 21:06) (97% - 98%)        PHYSICAL EXAM:    GENERAL: NAD  HEAD:  Atraumatic, Normocephalic  NECK: Supple, No JVD  CHEST/LUNG: Clear to auscultation bilaterally;   HEART: Regular rate and rhythm; No murmurs  ABDOMEN: Soft, Nontender, Nondistended  EXTREMITIES:  Good peripheral Pulses, No clubbing, cyanosis, or edema      LABS:                        11.2   3.18  )-----------( 205      ( 01 Apr 2020 05:55 )             32.9     04-01    140  |  103  |  7<L>  ----------------------------<  81  3.9   |  23  |  0.5<L>    Ca    8.8      01 Apr 2020 05:55  Phos  3.7     03-31  Mg     2.0     03-31    TPro  6.3  /  Alb  4.0  /  TBili  0.4  /  DBili  x   /  AST  24  /  ALT  15  /  AlkPhos  55  04-01    LIVER FUNCTIONS - ( 01 Apr 2020 05:55 )  Alb: 4.0 g/dL / Pro: 6.3 g/dL / ALK PHOS: 55 U/L / ALT: 15 U/L / AST: 24 U/L / GGT: x                 Procalcitonin, Serum: 0.06 ng/mL (04-01-20 @ 05:55)  Procalcitonin, Serum: 0.07 ng/mL (03-31-20 @ 07:30)            ABG & VENT SETTINGS (when applicable)    < from: Xray Chest 1 View- PORTABLE-Routine (03.31.20 @ 10:10) >  IMPRESSION:     Stable patchy left midlung opacities.    < end of copied text >      DIAGNOSTIC STUDIES:  12 Lead ECG:   Ventricular Rate 83 BPM    Atrial Rate 83 BPM    P-R Interval 96 ms    QRS Duration 86 ms    Q-T Interval 372 ms    QTC Calculation(Bezet) 437 ms    P Axis 9 degrees    R Axis 7 degrees    T Axis 4 degrees    Diagnosis Line Sinus rhythm with short CT  Moderate voltage criteria for LVH, may be normal variant  Borderline ECG    Confirmed by Miguel Lamb ([id number]) on 4/1/2020 1:11:11 PM (04-01-20 @ 08:58)      COVID-19 PCR: NotDetec: This test has been validated by [employer] to be accurate;  though it has not been FDA cleared/approved by the usual pathway.  As with all laboratory tests, results should be correlated with clinical  findings. (03.30.20 @ 14:34)    Procalcitonin, Serum: 0.06: Procalcitonin (PCT) Interpretation (ng/mL) - Diagnosis of systemic  bacterial infection/sepsis  PCT < 0.5: Systemic infection (sepsis) is not likely and risk for  progression to severe systemic infection is low. Local bacterial  infection is possible. If early sepsis is suspected clinically, PCT  should be re-assessed in 6-24 hours.  PCT >/= 0.5 but < 2.0: Systemic infection (sepsis) is possible, but other  conditions are known to elevate PCT as well. Moderate risk for  progression to severe systemic infection. The patient should be closely  monitored both clinically and by re-assessing PCT within 6-24 hours.  PCT >/= 2.0 but < 10.0: Systemic infection (sepsis) is likely, unless  other causes are known. High risk of progression to severe systemic  infection (severe sepsis/septic shock).  PCT >/= 10.0: Important systemic inflammatory response, almost  exclusively due to severe bacterial sepsis or septic shock. High  likelihood of severe sepsis or septic shock. ng/mL (04.01.20 @ 05:55)

## 2020-04-01 NOTE — PROGRESS NOTE ADULT - ASSESSMENT
1. Pneumonia - Bacterial vs Viral.       Still fever low grade daily      Still await COVID result       repeat chest xray tomorrow   Monitoring markers     D/W Family, daughter over phone and explained patient condition     2. SLE- Has been on Hydroxychloroquine     3. Hypothyroid - Synthroid

## 2020-04-01 NOTE — PROGRESS NOTE ADULT - SUBJECTIVE AND OBJECTIVE BOX
PROGRESS NOTE  Chief Complaint:  Patient is a 59y old  Female who presents with a chief complaint of Shortness of breath and weakness (31 Mar 2020 13:55)      HPI:  [59 year old woman]    CC: Shortness of breath and weakness     PMH: SLE on plaquenil     Past Surgical History: Denies     History of Present Illness goes back to the past 2 weeks when the patient developed a dry cough associated with severe malaise, diffuse myalgias and frontal headaches. She also endorses fevers and chills up to 102. She intially took a full course of augmentin with minimal improvement. 4 days prior to admission she went to urgent care and was given a course to zithromax of which she took 4 days also with minimal improvement. Two days prior to presentation, she started developing chest pain, mid substernal radiating to her back worse with coughs and deep inspirations. On the day of presentation she went back to urgent care and was referred to the ED due to labored breathing.   She endorses her mother is COVID 19 and hospitalized however she has not seen her in 2 weeks. She does not think she was in contact with anyone positive otherwise however she does use the public transit for transportation.     In the ED, BP was 123/72, HR was 96, SpO2 was 97 on room air, Temp was 37.1. Labs did not show WBC but showed lymphopenia, normal LFTs. FLU was negative. CXR showed a possible left middle lobe opacity. She was given ceftriaxone and azithromycin (30 Mar 2020 18:54)      ALLERGIES:  No Known Allergies      HOSPITAL MEDICATIONS:  MEDICATIONS  (STANDING):  hydroxychloroquine   Oral   hydroxychloroquine 200 milliGRAM(s) Oral every 12 hours  lactated ringers. 1000 milliLiter(s) (75 mL/Hr) IV Continuous <Continuous>  levothyroxine  Oral Tab/Cap - Peds 112 MICROGram(s) Oral daily    MEDICATIONS  (PRN):  acetaminophen   Tablet .. 650 milliGRAM(s) Oral every 4 hours PRN Temp greater or equal to 38.5C (101.3F)  acetaminophen  Suppository .. 650 milliGRAM(s) Rectal every 4 hours PRN Temp greater or equal to 38C (100.4F)      PMHX/PSHX:  Lupus  No significant past surgical history    REVIEW OF SYSTEMS:     General:  No wt loss, fevers, chills, night sweats, fatigue,   Eyes:  Good vision, no reported pain  ENT:  No sore throat, pain, runny nose, dysphagia  CV:  No pain, palpitations, hypo/hypertension  Resp:  No dyspnea, cough, tachypnea, wheezing  GI:  No pain, No nausea, No vomiting, No diarrhea, No constipation, No weight loss, No fever, No pruritis, No rectal bleeding, No tarry stools, No dysphagia,  :  No pain, bleeding, incontinence, nocturia  Muscle:  No pain, weakness  Neuro:  No weakness, tingling, memory problems  Psych:  No fatigue, insomnia, mood problems, depression  Endocrine:  No polyuria, polydipsia, cold/heat intolerance  Heme:  No petechiae, ecchymosis, easy bruisability  Skin:  No rash, tattoos, scars, edema      PHYSICAL EXAM:   Vital Signs:  Vital Signs Last 24 Hrs  T(C): 38.1 (01 Apr 2020 12:10), Max: 38.3 (01 Apr 2020 05:44)  T(F): 100.5 (01 Apr 2020 12:10), Max: 100.9 (01 Apr 2020 05:44)  HR: 93 (01 Apr 2020 12:10) (84 - 93)  BP: 127/61 (01 Apr 2020 12:10) (110/55 - 134/-)  BP(mean): --  RR: 20 (01 Apr 2020 12:10) (19 - 20)  SpO2: 98% (01 Apr 2020 12:10) (97% - 98%)      GENERAL:  Appears stated age, well-groomed, well-nourished, no distress  HEENT:  NC/AT,  conjunctivae clear and pink, no thyromegaly, nodules, adenopathy, no JVD, sclera -anicteric  CHEST:  Full & symmetric excursion, no increased effort, breath sounds clear  HEART:  Regular rhythm, S1, S2, no murmur/rub/S3/S4, no abdominal bruit, no edema  ABDOMEN:  Soft, non-tender, non-distended, normoactive bowel sounds,  no masses ,no hepato-splenomegaly, no signs of chronic liver disease  EXTEREMITIES:  no cyanosis,clubbing or edema  SKIN:  No rash/erythema/ecchymoses/petechiae/wounds/abscess/warm/dry  NEURO:  Alert, oriented, no asterixis, no tremor, no encephalopathy    LABS:                        11.2   3.18  )-----------( 205      ( 01 Apr 2020 05:55 )             32.9     04-01    140  |  103  |  7<L>  ----------------------------<  81  3.9   |  23  |  0.5<L>    Ca    8.8      01 Apr 2020 05:55  Phos  3.7     03-31  Mg     2.0     03-31    TPro  6.3  /  Alb  4.0  /  TBili  0.4  /  DBili  x   /  AST  24  /  ALT  15  /  AlkPhos  55  04-01    LIVER FUNCTIONS - ( 01 Apr 2020 05:55 )  Alb: 4.0 g/dL / Pro: 6.3 g/dL / ALK PHOS: 55 U/L / ALT: 15 U/L / AST: 24 U/L / GGT: x           PT/INR - ( 30 Mar 2020 14:54 )   PT: 13.10 sec;   INR: 1.14 ratio         PTT - ( 30 Mar 2020 14:54 )  PTT:31.7 sec      ASSESSMENT & PLAN:

## 2020-04-01 NOTE — CONSULT NOTE ADULT - ASSESSMENT
58 yo female with known SLE on plaquenil admitted with fever myalgias cough and dyspnea had recent exposure to Novel Coronavirus with abnromal cxr concern for Viral Pneumonia    covid 19 negative  procalcitonin normal  lymphopenia prsent  cxr patchy consolidation  repeat cxr in am  would repeat covid 19 pcr   02 sat on ra 98 percent  contact and respiratory isolation  dvt/gi px

## 2020-04-02 ENCOUNTER — TRANSCRIPTION ENCOUNTER (OUTPATIENT)
Age: 60
End: 2020-04-02

## 2020-04-02 VITALS
DIASTOLIC BLOOD PRESSURE: 67 MMHG | SYSTOLIC BLOOD PRESSURE: 153 MMHG | TEMPERATURE: 100 F | HEART RATE: 97 BPM | RESPIRATION RATE: 22 BRPM | OXYGEN SATURATION: 97 %

## 2020-04-02 LAB
ALBUMIN SERPL ELPH-MCNC: 4 G/DL — SIGNIFICANT CHANGE UP (ref 3.5–5.2)
ALP SERPL-CCNC: 58 U/L — SIGNIFICANT CHANGE UP (ref 30–115)
ALT FLD-CCNC: 12 U/L — SIGNIFICANT CHANGE UP (ref 0–41)
ANION GAP SERPL CALC-SCNC: 11 MMOL/L — SIGNIFICANT CHANGE UP (ref 7–14)
AST SERPL-CCNC: 21 U/L — SIGNIFICANT CHANGE UP (ref 0–41)
BASOPHILS # BLD AUTO: 0.01 K/UL — SIGNIFICANT CHANGE UP (ref 0–0.2)
BASOPHILS NFR BLD AUTO: 0.4 % — SIGNIFICANT CHANGE UP (ref 0–1)
BILIRUB SERPL-MCNC: 0.4 MG/DL — SIGNIFICANT CHANGE UP (ref 0.2–1.2)
BUN SERPL-MCNC: 5 MG/DL — LOW (ref 10–20)
CALCIUM SERPL-MCNC: 9.1 MG/DL — SIGNIFICANT CHANGE UP (ref 8.5–10.1)
CHLORIDE SERPL-SCNC: 103 MMOL/L — SIGNIFICANT CHANGE UP (ref 98–110)
CO2 SERPL-SCNC: 27 MMOL/L — SIGNIFICANT CHANGE UP (ref 17–32)
CREAT SERPL-MCNC: 0.6 MG/DL — LOW (ref 0.7–1.5)
EOSINOPHIL # BLD AUTO: 0 K/UL — SIGNIFICANT CHANGE UP (ref 0–0.7)
EOSINOPHIL NFR BLD AUTO: 0 % — SIGNIFICANT CHANGE UP (ref 0–8)
GLUCOSE SERPL-MCNC: 99 MG/DL — SIGNIFICANT CHANGE UP (ref 70–99)
HCT VFR BLD CALC: 31.4 % — LOW (ref 37–47)
HGB BLD-MCNC: 11 G/DL — LOW (ref 12–16)
IMM GRANULOCYTES NFR BLD AUTO: 0 % — LOW (ref 0.1–0.3)
LYMPHOCYTES # BLD AUTO: 0.69 K/UL — LOW (ref 1.2–3.4)
LYMPHOCYTES # BLD AUTO: 27.1 % — SIGNIFICANT CHANGE UP (ref 20.5–51.1)
MAGNESIUM SERPL-MCNC: 2 MG/DL — SIGNIFICANT CHANGE UP (ref 1.8–2.4)
MCHC RBC-ENTMCNC: 32.1 PG — HIGH (ref 27–31)
MCHC RBC-ENTMCNC: 35 G/DL — SIGNIFICANT CHANGE UP (ref 32–37)
MCV RBC AUTO: 91.5 FL — SIGNIFICANT CHANGE UP (ref 81–99)
MONOCYTES # BLD AUTO: 0.23 K/UL — SIGNIFICANT CHANGE UP (ref 0.1–0.6)
MONOCYTES NFR BLD AUTO: 9 % — SIGNIFICANT CHANGE UP (ref 1.7–9.3)
NEUTROPHILS # BLD AUTO: 1.62 K/UL — SIGNIFICANT CHANGE UP (ref 1.4–6.5)
NEUTROPHILS NFR BLD AUTO: 63.5 % — SIGNIFICANT CHANGE UP (ref 42.2–75.2)
NRBC # BLD: 0 /100 WBCS — SIGNIFICANT CHANGE UP (ref 0–0)
PLATELET # BLD AUTO: 207 K/UL — SIGNIFICANT CHANGE UP (ref 130–400)
POTASSIUM SERPL-MCNC: 4.1 MMOL/L — SIGNIFICANT CHANGE UP (ref 3.5–5)
POTASSIUM SERPL-SCNC: 4.1 MMOL/L — SIGNIFICANT CHANGE UP (ref 3.5–5)
PROT SERPL-MCNC: 6.4 G/DL — SIGNIFICANT CHANGE UP (ref 6–8)
RBC # BLD: 3.43 M/UL — LOW (ref 4.2–5.4)
RBC # FLD: 11.4 % — LOW (ref 11.5–14.5)
SODIUM SERPL-SCNC: 141 MMOL/L — SIGNIFICANT CHANGE UP (ref 135–146)
WBC # BLD: 2.55 K/UL — LOW (ref 4.8–10.8)
WBC # FLD AUTO: 2.55 K/UL — LOW (ref 4.8–10.8)

## 2020-04-02 PROCEDURE — 99239 HOSP IP/OBS DSCHRG MGMT >30: CPT

## 2020-04-02 PROCEDURE — 71045 X-RAY EXAM CHEST 1 VIEW: CPT | Mod: 26

## 2020-04-02 PROCEDURE — 93010 ELECTROCARDIOGRAM REPORT: CPT

## 2020-04-02 RX ORDER — ACETAMINOPHEN 500 MG
1 TABLET ORAL
Qty: 0 | Refills: 0 | DISCHARGE
Start: 2020-04-02

## 2020-04-02 RX ORDER — AZITHROMYCIN 500 MG/1
2 TABLET, FILM COATED ORAL
Qty: 6 | Refills: 0
Start: 2020-04-02 | End: 2020-04-06

## 2020-04-02 RX ORDER — ACETAMINOPHEN 500 MG
2 TABLET ORAL
Qty: 0 | Refills: 0 | DISCHARGE
Start: 2020-04-02

## 2020-04-02 RX ORDER — AZITHROMYCIN 500 MG/1
0 TABLET, FILM COATED ORAL
Qty: 1 | Refills: 0
Start: 2020-04-02

## 2020-04-02 RX ORDER — AZITHROMYCIN 500 MG/1
0 TABLET, FILM COATED ORAL
Qty: 6 | Refills: 0
Start: 2020-04-02 | End: 2020-04-06

## 2020-04-02 RX ADMIN — Medication 200 MILLIGRAM(S): at 11:44

## 2020-04-02 RX ADMIN — SODIUM CHLORIDE 75 MILLILITER(S): 9 INJECTION, SOLUTION INTRAVENOUS at 06:01

## 2020-04-02 RX ADMIN — Medication 200 MILLIGRAM(S): at 00:22

## 2020-04-02 RX ADMIN — Medication 650 MILLIGRAM(S): at 11:12

## 2020-04-02 RX ADMIN — Medication 112 MICROGRAM(S): at 05:31

## 2020-04-02 RX ADMIN — Medication 650 MILLIGRAM(S): at 05:51

## 2020-04-02 NOTE — PROGRESS NOTE ADULT - SUBJECTIVE AND OBJECTIVE BOX
patient feels week , on room air saturating 97 % on room air , was assessed for discharge . family doesnot want  patient to be discharged  today . spoke with   Temperature  Temp (F): 99.3 Degrees F      Heart Rate  Heart Rate Heart Rate (beats/min): 96 /min    BP Systolic Systolic: 127 mm Hg  BP Diastolic Diastolic (mm Hg): 83 mm Hg  Respiratory/Pulse Oximetry/Oxygen Therapy  Respiration Rate (breaths/min) Respiration Rate (breaths/min): 24 /min  SpO2 (%) SpO2 (%): 97 % patient feels week , on room air saturating 97 % on room air , was assessed for discharge . family doesnot want  patient to be discharged  today . spoke with   Temperature  Temp (F): 99.3 Degrees F      Heart Rate  Heart Rate Heart Rate (beats/min): 96 /min    BP Systolic Systolic: 127 mm Hg  BP Diastolic Diastolic (mm Hg): 83 mm Hg  Respiratory/Pulse Oximetry/Oxygen Therapy  Respiration Rate (breaths/min) Respiration Rate (breaths/min): 24 /min  SpO2 (%) SpO2 (%): 97 %      < from: Xray Chest 1 View- PORTABLE-Routine (04.02.20 @ 04:49) >  ROCEDURE DATE:  04/02/2020            INTERPRETATION:  Clinical History/Reason for Exam:  follow up    Comparison: XR CHEST 3/30/2020, XR CHEST 3/31/2020.      Findings:    Technique/Positioning:  Frontal portable radiograph of the chest.    Support devices:  none    Cardiac/mediastinum/hilum: Unremarkable    Lung parenchyma/ Pleura: Stable reticular densities left lung base. Interval resolution of blunted left costophrenic angle. No pneumothorax      Skeleton/soft tissues: No focal skeletal lesions are identified.      Impression:    Stable reticular densities left lung base. Interval resolution of blunted left costophrenic angle.     No pneumothorax      < end of copied text >  CBC Full  -  ( 02 Apr 2020 07:31 )  WBC Count : 2.55 K/uL  RBC Count : 3.43 M/uL  Hemoglobin : 11.0 g/dL  Hematocrit : 31.4 %  Platelet Count - Automated : 207 K/uL  Mean Cell Volume : 91.5 fL  Mean Cell Hemoglobin : 32.1 pg  Mean Cell Hemoglobin Concentration : 35.0 g/dL  Auto Neutrophil # : 1.62 K/uL  Auto Lymphocyte # : 0.69 K/uL  Auto Monocyte # : 0.23 K/uL  Auto Eosinophil # : 0.00 K/uL  Auto Basophil # : 0.01 K/uL  Auto Neutrophil % : 63.5 %  Auto Lymphocyte % : 27.1 %  Auto Monocyte % : 9.0 %  Auto Eosinophil % : 0.0 %  Auto Basophil % : 0.4 %  04-02    141  |  103  |  5<L>  ----------------------------<  99  4.1   |  27  |  0.6<L>    Ca    9.1      02 Apr 2020 07:31  Mg     2.0     04-02    TPro  6.4  /  Alb  4.0  /  TBili  0.4  /  DBili  x   /  AST  21  /  ALT  12  /  AlkPhos  58  04-02  < from: 12 Lead ECG (04.01.20 @ 08:58) >    Ventricular Rate 83 BPM    Atrial Rate 83 BPM    P-R Interval 96 ms    QRS Duration 86 ms    Q-T Interval 372 ms    QTC Calculation(Bezet) 437 ms    P Axis 9 degrees    R Axis 7 degrees    T Axis 4 degrees    Diagnosis Line Sinus rhythm with short [place]  Moderate voltage criteria for LVH, may be normal variant  Borderline ECG    Confirmed by Miguel Lamb ([id number]) on 4/1/2020 1:11:11 PM    < end of copied text >  < from: 12 Lead ECG (04.01.20 @ 08:58) >    Ventricular Rate 83 BPM    Atrial Rate 83 BPM    P-R Interval 96 ms    QRS Duration 86 ms    Q-T Interval 372 ms    QTC Calculation(Bezet) 437 ms    P Axis 9 degrees    R Axis 7 degrees    T Axis 4 degrees    Diagnosis Line Sinus rhythm with short [place]  Moderate voltage criteria for LVH, may be normal variant  Borderline ECG    Confirmed by Miguel Lamb [id number]) on 4/1/2020 1:11:11 PM    < end of copied text > patient feels week , on room air saturating 97 % on room air , was assessed for discharge . family doesnot want  patient to be discharged  today . spoke with  family again , they are in agreement   Temperature  Temp (F): 99.3 Degrees F      Heart Rate  Heart Rate Heart Rate (beats/min): 96 /min    BP Systolic Systolic: 127 mm Hg  BP Diastolic Diastolic (mm Hg): 83 mm Hg  Respiratory/Pulse Oximetry/Oxygen Therapy  Respiration Rate (breaths/min) Respiration Rate (breaths/min): 24 /min  SpO2 (%) SpO2 (%): 97 %      < from: Xray Chest 1 View- PORTABLE-Routine (04.02.20 @ 04:49) >  ROCEDURE DATE:  04/02/2020            INTERPRETATION:  Clinical History/Reason for Exam:  follow up    Comparison: XR CHEST 3/30/2020, XR CHEST 3/31/2020.      Findings:    Technique/Positioning:  Frontal portable radiograph of the chest.    Support devices:  none    Cardiac/mediastinum/hilum: Unremarkable    Lung parenchyma/ Pleura: Stable reticular densities left lung base. Interval resolution of blunted left costophrenic angle. No pneumothorax      Skeleton/soft tissues: No focal skeletal lesions are identified.      Impression:    Stable reticular densities left lung base. Interval resolution of blunted left costophrenic angle.     No pneumothorax      < end of copied text >  CBC Full  -  ( 02 Apr 2020 07:31 )  WBC Count : 2.55 K/uL  RBC Count : 3.43 M/uL  Hemoglobin : 11.0 g/dL  Hematocrit : 31.4 %  Platelet Count - Automated : 207 K/uL  Mean Cell Volume : 91.5 fL  Mean Cell Hemoglobin : 32.1 pg  Mean Cell Hemoglobin Concentration : 35.0 g/dL  Auto Neutrophil # : 1.62 K/uL  Auto Lymphocyte # : 0.69 K/uL  Auto Monocyte # : 0.23 K/uL  Auto Eosinophil # : 0.00 K/uL  Auto Basophil # : 0.01 K/uL  Auto Neutrophil % : 63.5 %  Auto Lymphocyte % : 27.1 %  Auto Monocyte % : 9.0 %  Auto Eosinophil % : 0.0 %  Auto Basophil % : 0.4 %  04-02    141  |  103  |  5<L>  ----------------------------<  99  4.1   |  27  |  0.6<L>    Ca    9.1      02 Apr 2020 07:31  Mg     2.0     04-02    TPro  6.4  /  Alb  4.0  /  TBili  0.4  /  DBili  x   /  AST  21  /  ALT  12  /  AlkPhos  58  04-02  < from: 12 Lead ECG (04.01.20 @ 08:58) >    Ventricular Rate 83 BPM    Atrial Rate 83 BPM    P-R Interval 96 ms    QRS Duration 86 ms    Q-T Interval 372 ms    QTC Calculation(Bezet) 437 ms    P Axis 9 degrees    R Axis 7 degrees    T Axis 4 degrees    Diagnosis Line Sinus rhythm with short [place]  Moderate voltage criteria for LVH, may be normal variant  Borderline ECG    Confirmed by Miguel Lamb ([id number]) on 4/1/2020 1:11:11 PM    < end of copied text >  < from: 12 Lead ECG (04.01.20 @ 08:58) >    Ventricular Rate 83 BPM    Atrial Rate 83 BPM    P-R Interval 96 ms    QRS Duration 86 ms    Q-T Interval 372 ms    QTC Calculation(Bezet) 437 ms    P Axis 9 degrees    R Axis 7 degrees    T Axis 4 degrees    Diagnosis Line Sinus rhythm with short [place]  Moderate voltage criteria for LVH, may be normal variant  Borderline ECG    Confirmed by Miguel Lamb ([id number]) on 4/1/2020 1:11:11 PM    < end of copied text >

## 2020-04-02 NOTE — DISCHARGE NOTE PROVIDER - NSDCMRMEDTOKEN_GEN_ALL_CORE_FT
acetaminophen 325 mg oral tablet: 2 tab(s) orally every 4 hours, As needed, Temp greater or equal to 38.5C (101.3F)  acetaminophen 650 mg rectal suppository: 1 suppository(ies) rectal every 4 hours, As needed, Temp greater or equal to 38C (100.4F)  azithromycin 250 mg oral tablet: 2 tab(s) orally day one, day two to day 5   1 tab daily   SYNTHROID    TAB 112MCG: acetaminophen 325 mg oral tablet: 2 tab(s) orally every 4 hours, As needed, Temp greater or equal to 38.5C (101.3F)  azithromycin 250 mg oral tablet: 2 tab(s) orally day one, day two to day 5   1 tab daily   SYNTHROID    TAB 112MCG:

## 2020-04-02 NOTE — PHYSICAL THERAPY INITIAL EVALUATION ADULT - GAIT DISTANCE, PT EVAL
in room, no loss of balance, steady gait, pt c/o of mild headache during ambulation, c/o of weakness but safe to ambulate without assist/75 feet

## 2020-04-02 NOTE — DISCHARGE NOTE PROVIDER - CARE PROVIDER_API CALL
pmd,   Phone: (   )    -  Fax: (   )    -  Follow Up Time:     An Ayala pulmonologist  Phone: (   )    -  Fax: (   )    -  Follow Up Time:

## 2020-04-02 NOTE — PROGRESS NOTE ADULT - ATTENDING COMMENTS
Above note adjusted.   D/W Family and patient again. Agree to be d/c today.   COVID - Negative now.   Chest xray improved.     Patient condition improved. no need for oxygen at all. patient never used oxygen in hospital. hemodynamically stable and no symptoms.   Explained to patient and family that low grade fever may persists for sometime. One to two weeks. COVID positive or negative status wouldn't make  much difference since patient is not in shortness of breath and not requiring oxygen. Patient and family understands and agrees    Prophylactically one course of z pack if she needs at home.   Clear instruction given when to return to hospital if needed.   Can follow up out patient with pulmonary since patient is following with already - dr An Omer she is following with

## 2020-04-02 NOTE — DISCHARGE NOTE PROVIDER - HOSPITAL COURSE
[59 year old woman]        CC: Shortness of breath and weakness         PMH: SLE on plaquenil         Past Surgical History: Denies         History of Present Illness goes back to the past 2 weeks when the patient developed a dry cough associated with severe malaise, diffuse myalgias and frontal headaches. She also endorses fevers and chills up to 102. She intially took a full course of augmentin with minimal improvement. 4 days prior to admission she went to urgent care and was given a course to zithromax of which she took 4 days also with minimal improvement. Two days prior to presentation, she started developing chest pain, mid substernal radiating to her back worse with coughs and deep inspirations. On the day of presentation she went back to urgent care and was referred to the ED due to labored breathing./ shortness of breath    She endorses her mother is COVID 19 and hospitalized however she has not seen her in 2 weeks. She does not think she was in contact with anyone positive otherwise however she does use the public transit for transportation.         In the ED, BP was 123/72, HR was 96, SpO2 was 97 on room air, Temp was 37.1. Labs did not show WBC but showed lymphopenia, normal LFTs. FLU was negative. CXR showed a possible left middle lobe opacity. She was given ceftriaxone and azithromycin (30 Mar 2020 18:54)  her covid test came back negative , her xray  from 4/2 showed stable reticular densities and interval resolution of blunted left costophrenic angle.    Assessment and Recommendation:     · Assessment        58 yo female with known SLE on plaquenil admitted with fever myalgias cough and dyspnea had recent exposure to Novel Coronavirus with abnromal cxr concern for Viral Pneumonia        covid 19 negative    procalcitonin normal    lymphopenia prsent    cxr patchy consolidation    repeat cxr in am    would repeat covid 19 pcr     02 sat on ra 98 percent     she is doing good  in room air , evaluated by attending  , spoke to family  . she is being discharged today home 60 yo female with known SLE on plaquenil admitted with fever myalgias cough and dyspnea had recent exposure to Novel Coronavirus with abnromal cxr concern for Viral Pneumonia            CC: Shortness of breath and weakness         PMH: SLE on plaquenil         Past Surgical History: Denies         History of Present Illness goes back to the past 2 weeks when the patient developed a dry cough associated with severe malaise, diffuse myalgias and frontal headaches. She also endorses fevers and chills up to 102. She intially took a full course of augmentin with minimal improvement. 4 days prior to admission she went to urgent care and was given a course to zithromax of which she took 4 days also with minimal improvement. Two days prior to presentation, she started developing chest pain, mid substernal radiating to her back worse with coughs and deep inspirations. On the day of presentation she went back to urgent care and was referred to the ED due to labored breathing./ shortness of breath    She endorses her mother is COVID 19 and hospitalized however she has not seen her in 2 weeks. She does not think she was in contact with anyone positive otherwise however she does use the public transit for transportation.         In the ED, BP was 123/72, HR was 96, SpO2 was 97 on room air, Temp was 37.1. Labs did not show WBC but showed lymphopenia, normal LFTs. FLU was negative. CXR showed a possible left middle lobe opacity. She was given ceftriaxone and azithromycin (30 Mar 2020 18:54)  her covid test came back negative , her xray  from 4/2 showed stable reticular densities and interval resolution of blunted left costophrenic angle.    today her vitals are stable , low grade temp .     , she is aaox3        covid 19 negative    procalcitonin normal    lymphopenia prsent    cxr patchy consolidation    repeat cxr in am    would repeat covid 19 pcr     02 sat on ra 98 percent     she is doing good  in room air , evaluated by attending  , spoke to family  . she is being discharged today home

## 2020-04-02 NOTE — DISCHARGE NOTE NURSING/CASE MANAGEMENT/SOCIAL WORK - PATIENT PORTAL LINK FT
You can access the FollowMyHealth Patient Portal offered by [facility] by registering at the following website: [email or web address]. By joining [facility]’s FollowMyHealth portal, you will also be able to view your health information using other applications (apps) compatible with our system.

## 2020-04-02 NOTE — PROGRESS NOTE ADULT - SUBJECTIVE AND OBJECTIVE BOX
KEVIN SWIFT  59y, Female  Allergy: No Known Allergies      LAST 24-Hr EVENTS:    VITALS:  T(F): 100 (04-02-20 @ 12:52), Max: 102.6 (04-01-20 @ 21:06)  HR: 97 (04-02-20 @ 12:52)  BP: 153/67 (04-02-20 @ 12:52) (118/56 - 153/67)  RR: 22 (04-02-20 @ 12:52)  SpO2: 97% (04-02-20 @ 12:52)    PHYSICAL EXAM:    GENERAL: NAD  NECK: Supple, No JVD  CHEST/LUNG: CTA b/l  HEART: Regular rate and rhythm  ABDOMEN: Soft, Nontender, Nondistended  EXTREMITIES:  No clubbing, edema absent        TESTS & MEASUREMENTS:                          11.0   2.55  )-----------( 207      ( 02 Apr 2020 07:31 )             31.4       04-02    141  |  103  |  5<L>  ----------------------------<  99  4.1   |  27  |  0.6<L>    Ca    9.1      02 Apr 2020 07:31  Mg     2.0     04-02    TPro  6.4  /  Alb  4.0  /  TBili  0.4  /  DBili  x   /  AST  21  /  ALT  12  /  AlkPhos  58  04-02    LIVER FUNCTIONS - ( 02 Apr 2020 07:31 )  Alb: 4.0 g/dL / Pro: 6.4 g/dL / ALK PHOS: 58 U/L / ALT: 12 U/L / AST: 21 U/L / GGT: x               Serum Pro-Brain Natriuretic Peptide: 49 pg/mL (03-30-20 @ 14:54)    COVID-19 PCR: NotDetec (03-30-20 @ 14:34)    Procalcitonin, Serum: 0.06 ng/mL (04-01-20 @ 05:55)  Procalcitonin, Serum: 0.07 ng/mL (03-31-20 @ 07:30)  Procalcitonin, Serum: 0.07 ng/mL (03-30-20 @ 21:44)  dimer        ABG & VENT SETTINGS: (when applicable)        RADIOLOGY & ADDITIONAL TESTS:      MEDICATIONS:  MEDICATIONS  (STANDING):  lactated ringers. 1000 milliLiter(s) (75 mL/Hr) IV Continuous <Continuous>  levothyroxine  Oral Tab/Cap - Peds 112 MICROGram(s) Oral daily    MEDICATIONS  (PRN):  acetaminophen   Tablet .. 650 milliGRAM(s) Oral every 4 hours PRN Temp greater or equal to 38.5C (101.3F)  acetaminophen  Suppository .. 650 milliGRAM(s) Rectal every 4 hours PRN Temp greater or equal to 38C (100.4F) KEVIN SWIFT  59y, Female  Allergy: No Known Allergies      LAST 24-Hr EVENTS:  remains febrile  feels better    VITALS:  T(F): 100 (04-02-20 @ 12:52), Max: 102.6 (04-01-20 @ 21:06)  HR: 97 (04-02-20 @ 12:52)  BP: 153/67 (04-02-20 @ 12:52) (118/56 - 153/67)  RR: 22 (04-02-20 @ 12:52)  SpO2: 97% (04-02-20 @ 12:52)    PHYSICAL EXAM:    GENERAL: NAD  NECK: Supple, No JVD  CHEST/LUNG: CTA b/l  HEART: Regular rate and rhythm  ABDOMEN: Soft, Nontender, Nondistended  EXTREMITIES:  No clubbing, edema absent        TESTS & MEASUREMENTS:                          11.0   2.55  )-----------( 207      ( 02 Apr 2020 07:31 )             31.4       04-02    141  |  103  |  5<L>  ----------------------------<  99  4.1   |  27  |  0.6<L>    Ca    9.1      02 Apr 2020 07:31  Mg     2.0     04-02    TPro  6.4  /  Alb  4.0  /  TBili  0.4  /  DBili  x   /  AST  21  /  ALT  12  /  AlkPhos  58  04-02    LIVER FUNCTIONS - ( 02 Apr 2020 07:31 )  Alb: 4.0 g/dL / Pro: 6.4 g/dL / ALK PHOS: 58 U/L / ALT: 12 U/L / AST: 21 U/L / GGT: x               Serum Pro-Brain Natriuretic Peptide: 49 pg/mL (03-30-20 @ 14:54)    COVID-19 PCR: NotDetec (03-30-20 @ 14:34)    Procalcitonin, Serum: 0.06 ng/mL (04-01-20 @ 05:55)  Procalcitonin, Serum: 0.07 ng/mL (03-31-20 @ 07:30)  Procalcitonin, Serum: 0.07 ng/mL (03-30-20 @ 21:44)  dimer          RADIOLOGY & ADDITIONAL TESTS:  < from: Xray Chest 1 View- PORTABLE-Routine (04.02.20 @ 04:49) >  pression:    Stable reticular densities left lung base. Interval resolution of blunted left costophrenic angle.     No pneumothorax    < end of copied text >      MEDICATIONS:  MEDICATIONS  (STANDING):  lactated ringers. 1000 milliLiter(s) (75 mL/Hr) IV Continuous <Continuous>  levothyroxine  Oral Tab/Cap - Peds 112 MICROGram(s) Oral daily    MEDICATIONS  (PRN):  acetaminophen   Tablet .. 650 milliGRAM(s) Oral every 4 hours PRN Temp greater or equal to 38.5C (101.3F)  acetaminophen  Suppository .. 650 milliGRAM(s) Rectal every 4 hours PRN Temp greater or equal to 38C (100.4F)

## 2020-04-02 NOTE — PHYSICAL THERAPY INITIAL EVALUATION ADULT - CRITERIA FOR SKILLED THERAPEUTIC INTERVENTIONS
no need for PT in hospital setting, provided with therapeutic exercise handout for BLE , reviewed with pt.

## 2020-04-02 NOTE — DISCHARGE NOTE PROVIDER - PROVIDER TOKENS
FREE:[LAST:[pmd],PHONE:[(   )    -],FAX:[(   )    -]],FREE:[LAST:[Ahmad],FIRST:[An],PHONE:[(   )    -],FAX:[(   )    -],ADDRESS:[your pulmonologist]]

## 2020-04-02 NOTE — PROGRESS NOTE ADULT - ASSESSMENT
To anticipate to d/c in am .   hold discharge today .   continue supportive care , start ambulation/pt 1. Pneumonia - Bacterial vs Viral.       Still fever low grade       COVID  Negative , plan was to discharge today . family not in agreement       2. SLE- Has been on Hydroxychloroquine     3. Hypothyroid - Synthroid    To anticipate to d/c in am .   hold discharge today .   continue supportive care , start ambulation/pt 1. Pneumonia - Viral.       Still fever low grade       COVID  Negative , plan  to discharge today .     add z pack  2. SLE- Has been on Hydroxychloroquine     3. Hypothyroid - Synthroid     discharge today    continue supportive care , start ambulation/pt

## 2020-04-02 NOTE — PROGRESS NOTE ADULT - ASSESSMENT
· Assessment		  60 yo female with known SLE on plaquenil admitted with fever myalgias cough and dyspnea had recent exposure to Novel Coronavirus with abnromal cxr concern for Viral Pneumonia    covid 19 negative  procalcitonin normal  lymphopenia prsent  cxr patchy consolidation  repeat cxr in am  would repeat covid 19 pcr   02 sat on ra 98 percent  contact and respiratory isolation  dvt/gi px · Assessment		  58 yo female with known SLE on plaquenil admitted with fever myalgias cough and dyspnea had recent exposure to Novel Coronavirus with abnromal cxr concern for Viral Pneumonia    covid 19 negative  procalcitonin normal  lymphopenia prseent  cxr patchy consolidation  repeat cxr reviewd  02 sat on ra 98 percent  contact and respiratory isolation  dvt/gi px  d/c planning in progress

## 2020-04-02 NOTE — PHYSICAL THERAPY INITIAL EVALUATION ADULT - GENERAL OBSERVATIONS, REHAB EVAL
13:30-14:08 Pt encountered supine in bed in NAD. IV locked for ambulation by RN. Agreeable for PT . No c/o offered. Pt reports ambulating independently to bathroom.

## 2020-04-03 LAB
CRP SERPL-MCNC: 3.92 MG/DL — HIGH (ref 0–0.4)
PROLACTIN SERPL-MCNC: 15.8 NG/ML — SIGNIFICANT CHANGE UP (ref 3.4–24.1)

## 2020-04-07 ENCOUNTER — INPATIENT (INPATIENT)
Facility: HOSPITAL | Age: 60
LOS: 2 days | Discharge: HOME | End: 2020-04-10
Attending: HOSPITALIST | Admitting: HOSPITALIST
Payer: COMMERCIAL

## 2020-04-07 VITALS
TEMPERATURE: 101 F | HEART RATE: 120 BPM | RESPIRATION RATE: 22 BRPM | SYSTOLIC BLOOD PRESSURE: 121 MMHG | DIASTOLIC BLOOD PRESSURE: 66 MMHG | OXYGEN SATURATION: 99 %

## 2020-04-07 DIAGNOSIS — M32.13 LUNG INVOLVEMENT IN SYSTEMIC LUPUS ERYTHEMATOSUS: ICD-10-CM

## 2020-04-07 DIAGNOSIS — Z79.899 OTHER LONG TERM (CURRENT) DRUG THERAPY: ICD-10-CM

## 2020-04-07 DIAGNOSIS — E03.9 HYPOTHYROIDISM, UNSPECIFIED: ICD-10-CM

## 2020-04-07 DIAGNOSIS — J12.89 OTHER VIRAL PNEUMONIA: ICD-10-CM

## 2020-04-07 DIAGNOSIS — J98.11 ATELECTASIS: ICD-10-CM

## 2020-04-07 DIAGNOSIS — U07.1 COVID-19: ICD-10-CM

## 2020-04-07 LAB
ALBUMIN SERPL ELPH-MCNC: 4.3 G/DL — SIGNIFICANT CHANGE UP (ref 3.5–5.2)
ALP SERPL-CCNC: 70 U/L — SIGNIFICANT CHANGE UP (ref 30–115)
ALT FLD-CCNC: 13 U/L — SIGNIFICANT CHANGE UP (ref 0–41)
ANION GAP SERPL CALC-SCNC: 15 MMOL/L — HIGH (ref 7–14)
APTT BLD: 31.7 SEC — SIGNIFICANT CHANGE UP (ref 27–39.2)
AST SERPL-CCNC: 19 U/L — SIGNIFICANT CHANGE UP (ref 0–41)
BASOPHILS # BLD AUTO: 0.02 K/UL — SIGNIFICANT CHANGE UP (ref 0–0.2)
BASOPHILS NFR BLD AUTO: 0.2 % — SIGNIFICANT CHANGE UP (ref 0–1)
BILIRUB SERPL-MCNC: 0.5 MG/DL — SIGNIFICANT CHANGE UP (ref 0.2–1.2)
BUN SERPL-MCNC: 12 MG/DL — SIGNIFICANT CHANGE UP (ref 10–20)
CALCIUM SERPL-MCNC: 9.5 MG/DL — SIGNIFICANT CHANGE UP (ref 8.5–10.1)
CHLORIDE SERPL-SCNC: 100 MMOL/L — SIGNIFICANT CHANGE UP (ref 98–110)
CO2 SERPL-SCNC: 24 MMOL/L — SIGNIFICANT CHANGE UP (ref 17–32)
CREAT SERPL-MCNC: <0.5 MG/DL — LOW (ref 0.7–1.5)
D DIMER BLD IA.RAPID-MCNC: 369 NG/ML DDU — HIGH (ref 0–230)
D DIMER BLD IA.RAPID-MCNC: 401 NG/ML DDU — HIGH (ref 0–230)
EOSINOPHIL # BLD AUTO: 0 K/UL — SIGNIFICANT CHANGE UP (ref 0–0.7)
EOSINOPHIL NFR BLD AUTO: 0 % — SIGNIFICANT CHANGE UP (ref 0–8)
FIBRINOGEN PPP-MCNC: >700 MG/DL — HIGH (ref 204.4–570.6)
FLU A RESULT: NEGATIVE — SIGNIFICANT CHANGE UP
FLU A RESULT: NEGATIVE — SIGNIFICANT CHANGE UP
FLUAV AG NPH QL: NEGATIVE — SIGNIFICANT CHANGE UP
FLUBV AG NPH QL: NEGATIVE — SIGNIFICANT CHANGE UP
GLUCOSE SERPL-MCNC: 106 MG/DL — HIGH (ref 70–99)
HCG SERPL QL: NEGATIVE — SIGNIFICANT CHANGE UP
HCT VFR BLD CALC: 35.4 % — LOW (ref 37–47)
HGB BLD-MCNC: 12.3 G/DL — SIGNIFICANT CHANGE UP (ref 12–16)
IMM GRANULOCYTES NFR BLD AUTO: 0.3 % — SIGNIFICANT CHANGE UP (ref 0.1–0.3)
INR BLD: 1.26 RATIO — SIGNIFICANT CHANGE UP (ref 0.65–1.3)
LACTATE SERPL-SCNC: 0.9 MMOL/L — SIGNIFICANT CHANGE UP (ref 0.7–2)
LDH SERPL L TO P-CCNC: 233 — SIGNIFICANT CHANGE UP (ref 50–242)
LIDOCAIN IGE QN: 33 U/L — SIGNIFICANT CHANGE UP (ref 7–60)
LYMPHOCYTES # BLD AUTO: 0.98 K/UL — LOW (ref 1.2–3.4)
LYMPHOCYTES # BLD AUTO: 11 % — LOW (ref 20.5–51.1)
MCHC RBC-ENTMCNC: 31.8 PG — HIGH (ref 27–31)
MCHC RBC-ENTMCNC: 34.7 G/DL — SIGNIFICANT CHANGE UP (ref 32–37)
MCV RBC AUTO: 91.5 FL — SIGNIFICANT CHANGE UP (ref 81–99)
MONOCYTES # BLD AUTO: 0.48 K/UL — SIGNIFICANT CHANGE UP (ref 0.1–0.6)
MONOCYTES NFR BLD AUTO: 5.4 % — SIGNIFICANT CHANGE UP (ref 1.7–9.3)
NEUTROPHILS # BLD AUTO: 7.43 K/UL — HIGH (ref 1.4–6.5)
NEUTROPHILS NFR BLD AUTO: 83.1 % — HIGH (ref 42.2–75.2)
NRBC # BLD: 0 /100 WBCS — SIGNIFICANT CHANGE UP (ref 0–0)
NT-PROBNP SERPL-SCNC: 25 PG/ML — SIGNIFICANT CHANGE UP (ref 0–300)
PLATELET # BLD AUTO: 382 K/UL — SIGNIFICANT CHANGE UP (ref 130–400)
POTASSIUM SERPL-MCNC: 4.8 MMOL/L — SIGNIFICANT CHANGE UP (ref 3.5–5)
POTASSIUM SERPL-SCNC: 4.8 MMOL/L — SIGNIFICANT CHANGE UP (ref 3.5–5)
PROT SERPL-MCNC: 7.6 G/DL — SIGNIFICANT CHANGE UP (ref 6–8)
PROTHROM AB SERPL-ACNC: 14.5 SEC — HIGH (ref 9.95–12.87)
RBC # BLD: 3.87 M/UL — LOW (ref 4.2–5.4)
RBC # FLD: 11.8 % — SIGNIFICANT CHANGE UP (ref 11.5–14.5)
RSV RESULT: NEGATIVE — SIGNIFICANT CHANGE UP
RSV RNA RESP QL NAA+PROBE: NEGATIVE — SIGNIFICANT CHANGE UP
SODIUM SERPL-SCNC: 139 MMOL/L — SIGNIFICANT CHANGE UP (ref 135–146)
TROPONIN T SERPL-MCNC: <0.01 NG/ML — SIGNIFICANT CHANGE UP
WBC # BLD: 8.94 K/UL — SIGNIFICANT CHANGE UP (ref 4.8–10.8)
WBC # FLD AUTO: 8.94 K/UL — SIGNIFICANT CHANGE UP (ref 4.8–10.8)

## 2020-04-07 PROCEDURE — 74177 CT ABD & PELVIS W/CONTRAST: CPT | Mod: 26

## 2020-04-07 PROCEDURE — 71045 X-RAY EXAM CHEST 1 VIEW: CPT | Mod: 26

## 2020-04-07 PROCEDURE — 71275 CT ANGIOGRAPHY CHEST: CPT | Mod: 26

## 2020-04-07 PROCEDURE — 99285 EMERGENCY DEPT VISIT HI MDM: CPT

## 2020-04-07 PROCEDURE — 93010 ELECTROCARDIOGRAM REPORT: CPT

## 2020-04-07 RX ORDER — ACETAMINOPHEN 500 MG
650 TABLET ORAL EVERY 4 HOURS
Refills: 0 | Status: DISCONTINUED | OUTPATIENT
Start: 2020-04-07 | End: 2020-04-10

## 2020-04-07 RX ORDER — ACETAMINOPHEN 500 MG
975 TABLET ORAL ONCE
Refills: 0 | Status: COMPLETED | OUTPATIENT
Start: 2020-04-07 | End: 2020-04-07

## 2020-04-07 RX ORDER — HYDROXYCHLOROQUINE SULFATE 200 MG
200 TABLET ORAL DAILY
Refills: 0 | Status: DISCONTINUED | OUTPATIENT
Start: 2020-04-07 | End: 2020-04-09

## 2020-04-07 RX ORDER — MORPHINE SULFATE 50 MG/1
2 CAPSULE, EXTENDED RELEASE ORAL EVERY 4 HOURS
Refills: 0 | Status: DISCONTINUED | OUTPATIENT
Start: 2020-04-07 | End: 2020-04-09

## 2020-04-07 RX ORDER — ENOXAPARIN SODIUM 100 MG/ML
40 INJECTION SUBCUTANEOUS DAILY
Refills: 0 | Status: DISCONTINUED | OUTPATIENT
Start: 2020-04-07 | End: 2020-04-08

## 2020-04-07 RX ORDER — CEFEPIME 1 G/1
2000 INJECTION, POWDER, FOR SOLUTION INTRAMUSCULAR; INTRAVENOUS EVERY 12 HOURS
Refills: 0 | Status: DISCONTINUED | OUTPATIENT
Start: 2020-04-07 | End: 2020-04-10

## 2020-04-07 RX ORDER — LEVOTHYROXINE SODIUM 125 MCG
0 TABLET ORAL
Qty: 30 | Refills: 0 | DISCHARGE

## 2020-04-07 RX ORDER — LEVOTHYROXINE SODIUM 125 MCG
112 TABLET ORAL DAILY
Refills: 0 | Status: DISCONTINUED | OUTPATIENT
Start: 2020-04-07 | End: 2020-04-10

## 2020-04-07 RX ORDER — SODIUM CHLORIDE 9 MG/ML
1000 INJECTION INTRAMUSCULAR; INTRAVENOUS; SUBCUTANEOUS
Refills: 0 | Status: DISCONTINUED | OUTPATIENT
Start: 2020-04-07 | End: 2020-04-10

## 2020-04-07 RX ADMIN — Medication 975 MILLIGRAM(S): at 16:43

## 2020-04-07 RX ADMIN — Medication 110 MILLIGRAM(S): at 17:12

## 2020-04-07 RX ADMIN — CEFEPIME 100 MILLIGRAM(S): 1 INJECTION, POWDER, FOR SOLUTION INTRAMUSCULAR; INTRAVENOUS at 16:30

## 2020-04-07 RX ADMIN — SODIUM CHLORIDE 75 MILLILITER(S): 9 INJECTION INTRAMUSCULAR; INTRAVENOUS; SUBCUTANEOUS at 21:40

## 2020-04-07 RX ADMIN — ENOXAPARIN SODIUM 40 MILLIGRAM(S): 100 INJECTION SUBCUTANEOUS at 21:39

## 2020-04-07 NOTE — H&P ADULT - NSHPPHYSICALEXAM_GEN_ALL_CORE
Vital Signs Last 24 Hrs  T(C): 38.4 (07 Apr 2020 15:35), Max: 38.4 (07 Apr 2020 15:35)  T(F): 101.2 (07 Apr 2020 15:35), Max: 101.2 (07 Apr 2020 15:35)  HR: 117 (07 Apr 2020 15:35) (117 - 120)  BP: 123/61 (07 Apr 2020 15:35) (121/66 - 123/61)  BP(mean): --  RR: 20 (07 Apr 2020 15:35) (20 - 22)  SpO2: 97% (07 Apr 2020 15:35) (89% - 99%)    Gen:  HEENT:  Cardiac:  Chest/Lungs:  Abdomen:  Extremities:  Skin:  Psych:  Neuro: Vital Signs Last 24 Hrs  T(C): 38.4 (07 Apr 2020 15:35), Max: 38.4 (07 Apr 2020 15:35)  T(F): 101.2 (07 Apr 2020 15:35), Max: 101.2 (07 Apr 2020 15:35)  HR: 117 (07 Apr 2020 15:35) (117 - 120)  BP: 123/61 (07 Apr 2020 15:35) (121/66 - 123/61)  BP(mean): --  RR: 20 (07 Apr 2020 15:35) (20 - 22)  SpO2: 97% (07 Apr 2020 15:35) (89% - 99%)    Gen: Mild discomfort in bed  HEENT: NCAT EOMI PERRLA  Cardiac: S1 S2 tachycardia regular rhythm  Chest/Lungs: Tenderness to left posterior chest, DAEB at bases, b/l crackles diffuse, tachypnea, speaking in full short sentences  Abdomen: +BS soft NTND  Extremities: +2 PP b/l, -ve LLE b/l  Skin: warm and dry  Psych: normal affect  Neuro: AAOx3, no focal deficits

## 2020-04-07 NOTE — ED PROVIDER NOTE - NS ED ROS FT
Constitutional: See HPI.  Eyes: No visual changes  ENMT: No neck pain  Cardiac: + chest pain  Respiratory: + sob  GI: No nausea, vomiting, diarrhea or abdominal pain.  : No dysuria, frequency or burning  MS: +myalgias,  Psych: No suicidal or homicidal ideations.  Neuro: +headache  Skin: No skin rash.

## 2020-04-07 NOTE — ED PROVIDER NOTE - OBJECTIVE STATEMENT
59 year old female with a pmh lupus on hydroxychloroquine & hypothyroid presents here c.o sob. Patient states she's been having fever x 2 weeks and sob.  Patient was tested negative 3x for covid. Patient continues to have symptoms of fever sob nausea headache & mylagias & pleuritic chest pain.

## 2020-04-07 NOTE — H&P ADULT - ASSESSMENT
59 year old female with a past medical history of SLE, Hypothyroidism, recent hospital admission with negative COVID-19 PCR discharged 4/2/2020, presenting for worsening cough and fever.    ED: BIBA. /66, , RR 22, T 100.6F, SpO2 99% on 4LNC. Given tylenol 975mg, Doxycycline 100mg, Cefepime 2000mg. EKG sinus tachycardia. CT negative for PE, showing atelectasis/consolidation at bases & multifocal GGO RUL/RML/SAUNDRA.    Viral Pneumonia, possible Bacterial Superinfection vs Idiopathic Interstitial Pneumonia/Cryptogenic Organizing Pneumonia/Lupus Pneumonitis secondary to Systemic Lupus Erythematosus; r/o COVID-19  - Current VS:   - Normal WBC count (was present on prior admission), no lymphopenia (< 0.8 on discharge previous admission), no thrombocytopenia, no elevated LFTs, negative Troponin, negative Lactate. CRP elevated on discharge from previous admission (3.92 from 1.82). F/u pending labs: D-dimer, CRP, Procalcitonin, Ferritin, LDH, CK  - CTC negative for PE or pericardial effusion; showing atelectasis/consolidation at bases with multifocal ground-glass opacities in RUL/RML/SAUNDRA  - SIRS with mild sepsis (T > 100.4, HR > 90 & RR > 20, suspected source of infection). PORT/PSI Score 50 points: Risk Class II, 0.6-0.9% mortality. qSOFA 1 point: not high risk, perform serial qSOFA assessment.  - F/u COVID-19 PCR, patient has been on Plaquenil; if confirmed positive start loading dose and maintenance therapy  - If COVID-19 PCR negative and no improvement in clinical status while on antibiotic therapy, consider Prednisone 1mg/kg/day for 3 days, and if stll no improvement consider Methylprednisolone 1g/day for 3 days. If no improvement on pulse glucocorticoids, consider Cyclophosphamide, Rituximab, IVIG.  - Tylenol 650mg q4h prn. Benzonatate 100mg q8h prn.  - Continue Doxycycline 100mg q12h and Cefepime 2g q12h  - NS @ 75cc/hr  - ID consult for possible bacterial superinfection, f/u Procalcitonin  - Pulse ox at rest on RA & on O2 therapy every 4 hours; provider to RN placed with documentation of amount of O2 therapy  - If pulse ox at rest on RA &/or on O2 therapy > 92% for a continuous period of 6 hours, attempt pulse ox on ambulation on RA &/or on O2 therapy; provider to RN placed with documentation of amount of O2 therapy  - Incentive spirometer 10 times q1h  - Lower head of bed to improve perfusion in upper lobes due to predominant bilateral basal atelectasis  - Consider therapeutic anticoagulation if ferritin > 600, LDH > 250 in addition to d-dimer > 1mg/ml  - Repeat CRP, LDH & Ferritin in AM. Repeat Procalcitonin & d-dimer q48h.  - If SpO2 < 92% on 100% NRB for > 6 hours, try placing patient in a prone position, consider diagnosis of Cytokine Release Syndrome and possible Anti-IL1 therapy with stat Critical Care consult  - F/u TTE. F/u C3 C4 ESR to r/o possible Lupus pericarditis; low suspicion.  - Isolation precautions    Hypothyroidism; synthroid  Left adrenal well-circumscribed nodule 1.5cm, Right hepatic lobe 0.8cm hypodensity; outpatient MRI with IV contrast    DVT ppx; Lovenox 40 qd  GI ppx; no indication  Activity; ambulate as tolerated, fall precautions  Diet; regular, aspiration precautions 59 year old female with a past medical history of SLE, Hypothyroidism, recent hospital admission with negative COVID-19 PCR discharged 4/2/2020, presenting for worsening cough and fever.    ED: BIBA. /66, , RR 22, T 100.6F, SpO2 99% on 4LNC. Given tylenol 975mg, Doxycycline 100mg, Cefepime 2000mg, NS 1L. EKG sinus tachycardia. CT negative for PE, showing atelectasis/consolidation at bases & multifocal GGO RUL/RML/SAUNDRA.    Viral Pneumonia, possible Bacterial Superinfection vs Idiopathic Interstitial Pneumonia/Cryptogenic Organizing Pneumonia/Lupus Pneumonitis secondary to Systemic Lupus Erythematosus; r/o COVID-19  - Current VS: 97% 2L NC, RR 20. Hemodynamically stable.  - Normal WBC count (was present on prior admission), no lymphopenia (< 0.8 on discharge previous admission), no thrombocytopenia, no elevated LFTs, negative Troponin, negative Lactate. CRP elevated on discharge from previous admission (3.92 from 1.82). F/u pending labs: D-dimer, CRP, Procalcitonin, Ferritin, LDH, CK  - CTC negative for PE or pericardial effusion; showing atelectasis/consolidation at bases with multifocal ground-glass opacities in RUL/RML/SAUNDRA  - SIRS with mild sepsis (T > 100.4, HR > 90 & RR > 20, suspected source of infection). PORT/PSI Score 50 points: Risk Class II, 0.6-0.9% mortality. qSOFA 1 point: not high risk, perform serial qSOFA assessment.  - F/u COVID-19 PCR, patient has been on Plaquenil; resume HCQ 200mg daily for SLE  - If COVID-19 PCR negative and no improvement in clinical status while on antibiotic therapy, consider Prednisone 1mg/kg/day for 3 days, and if stll no improvement consider Methylprednisolone 1g/day for 3 days. If no improvement on pulse glucocorticoids, consider Cyclophosphamide, Rituximab, IVIG.  - Tylenol 650mg q4h prn. Benzonatate 100mg q8h prn.  - Continue Doxycycline 100mg q12h and Cefepime 2g q12h  - NS @ 75cc/hr  - ID consult for possible bacterial superinfection, f/u Procalcitonin  - Pulse ox at rest on RA & on O2 therapy every 4 hours; provider to RN placed with documentation of amount of O2 therapy  - If pulse ox at rest on RA &/or on O2 therapy > 92% for a continuous period of 6 hours, attempt pulse ox on ambulation on RA &/or on O2 therapy; provider to RN placed with documentation of amount of O2 therapy  - Incentive spirometer 10 times q1h  - Lower head of bed to improve perfusion in upper lobes due to predominant bilateral basal atelectasis  - Consider therapeutic anticoagulation if ferritin > 600, LDH > 250 in addition to d-dimer > 1mg/ml  - Repeat CRP, LDH & Ferritin in AM. Repeat Procalcitonin & d-dimer q48h.  - If SpO2 < 92% on 100% NRB for > 6 hours, try placing patient in a prone position, consider diagnosis of Cytokine Release Syndrome and possible Anti-IL1 therapy with stat Critical Care consult  - F/u TTE. F/u C3 C4 ESR to r/o possible Lupus pericarditis; low suspicion.  - Isolation precautions    Hypothyroidism; synthroid qd  Left adrenal well-circumscribed nodule 1.5cm, Right hepatic lobe 0.8cm hypodensity; outpatient MRI with IV contrast    DVT ppx; Lovenox 40 qd  GI ppx; no indication  Activity; ambulate as tolerated, fall precautions  Diet; regular, aspiration precautions    Sign out to resident on call [phone] @6:09PM 59 year old female with a past medical history of SLE, Hypothyroidism, recent hospital admission with negative COVID-19 PCR discharged 4/2/2020, presenting for worsening cough and fever.    ED: BIBA. /66, , RR 22, T 100.6F, SpO2 99% on 4LNC. Given tylenol 975mg, Doxycycline 100mg, Cefepime 2000mg, NS 1L. EKG sinus tachycardia. CT negative for PE, showing atelectasis/consolidation at bases & multifocal GGO RUL/RML/SAUNDRA.    Viral Pneumonia, possible Bacterial Superinfection vs Idiopathic Interstitial Pneumonia/Cryptogenic Organizing Pneumonia/Lupus Pneumonitis secondary to Systemic Lupus Erythematosus; r/o COVID-19  - Current VS: 97% 2L NC, RR 20. Hemodynamically stable.  - Normal WBC count (was present on prior admission), no lymphopenia (< 0.8 on discharge previous admission), no thrombocytopenia, no elevated LFTs, negative Troponin, negative Lactate. CRP elevated on discharge from previous admission (3.92 from 1.82). F/u pending labs: D-dimer, CRP, Procalcitonin, Ferritin, LDH, CK  - CTC negative for PE or pericardial effusion; showing atelectasis/consolidation at bases with multifocal ground-glass opacities in RUL/RML/SAUNDRA  - SIRS with mild sepsis (T > 100.4, HR > 90 & RR > 20, suspected source of infection). PORT/PSI Score 50 points: Risk Class II, 0.6-0.9% mortality. qSOFA 1 point: not high risk, perform serial qSOFA assessment.  - F/u COVID-19 PCR, patient has been on Plaquenil; resume HCQ 200mg daily for SLE  - If COVID-19 PCR negative and no improvement in clinical status while on antibiotic therapy, consider Prednisone 1mg/kg/day for 3 days, and if stll no improvement consider Methylprednisolone 1g/day for 3 days. If no improvement on pulse glucocorticoids, consider Cyclophosphamide, Rituximab, IVIG.  - Tylenol 650mg q4h prn. Benzonatate 100mg q8h prn. Morphine 2mg q4h prn  - Continue Doxycycline 100mg q12h and Cefepime 2g q12h  - NS @ 75cc/hr  - ID consult for possible bacterial superinfection, f/u Procalcitonin  - Pulse ox at rest on RA & on O2 therapy every 4 hours; provider to RN placed with documentation of amount of O2 therapy  - If pulse ox at rest on RA &/or on O2 therapy > 92% for a continuous period of 6 hours, attempt pulse ox on ambulation on RA &/or on O2 therapy; provider to RN placed with documentation of amount of O2 therapy  - Incentive spirometer 10 times q1h  - Lower head of bed to improve perfusion in upper lobes due to predominant bilateral basal atelectasis  - Consider therapeutic anticoagulation if ferritin > 600, LDH > 250 in addition to d-dimer > 1mg/ml  - Repeat CRP, LDH & Ferritin in AM. Repeat Procalcitonin & d-dimer q48h.  - If SpO2 < 92% on 100% NRB for > 6 hours, try placing patient in a prone position, consider diagnosis of Cytokine Release Syndrome and possible Anti-IL1 therapy with stat Critical Care consult  - F/u TTE. F/u C3 C4 ESR to r/o possible Lupus pericarditis; low suspicion.  - Isolation precautions    Hypothyroidism; synthroid qd  Left adrenal well-circumscribed nodule 1.5cm, Right hepatic lobe 0.8cm hypodensity; outpatient MRI with IV contrast    DVT ppx; Lovenox 40 qd  GI ppx; no indication  Activity; ambulate as tolerated, fall precautions  Diet; regular, aspiration precautions    Sign out to resident on call [phone] @6:09PM

## 2020-04-07 NOTE — H&P ADULT - NSHPLABSRESULTS_GEN_ALL_CORE
Labs                          12.3   8.94  )-----------( 382      ( 07 Apr 2020 11:27 )             35.4     04-07    139  |  100  |  12  ----------------------------<  106<H>  4.8   |  24  |  <0.5<L>    Ca    9.5      07 Apr 2020 11:27    TPro  7.6  /  Alb  4.3  /  TBili  0.5  /  DBili  x   /  AST  19  /  ALT  13  /  AlkPhos  70  04-07    PT/INR - ( 07 Apr 2020 11:27 )   PT: 14.50 sec;   INR: 1.26 ratio    PTT - ( 07 Apr 2020 11:27 )  PTT:31.7 sec    Troponin T, Serum (04.07.20 @ 11:27)    Troponin T, Serum: <0.01 ng/mL    Lactate, Blood (04.07.20 @ 11:27)    Lactate, Blood: 0.9 mmol/L    Serum Pro-Brain Natriuretic Peptide (04.07.20 @ 11:27)    Serum Pro-Brain Natriuretic Peptide: 25 pg/mL    Lipase, Serum (04.07.20 @ 11:27)    Lipase, Serum: 33 U/L    FLU A B RSV Detection by PCR (04.07.20 @ 10:07)    Flu A Result: Negative: Negative results do not preclude influenza infection and  should not be used as the sole basis for treatment or  other patient management decisions.  A positive result may occur in the absence of viable virus.  By: [facility] Xpert Flu viral assay by Reverse Transcriptase  Polymerase Chain Reaction (RT-PCR).    Flu B Result: Negative    RSV Result: Negative    COVID-19 pending    EKG: sinus tachycardia 106, QTc 441    Radiology  < from: Xray Chest 1 View-PORTABLE IMMEDIATE (04.07.20 @ 14:40) >  Impression:  Diffuse interstitial opacities, follow-up as needed.  < end of copied text >    < from: CT Angio Chest Abdomen & Pelvis w/ IV Cont (04.07.20 @ 14:14) >  IMPRESSION:   No evidence of a pulmonary embolism.  Bandlike atelectasis/consolidation seen at the lung bases. Multifocal peripheral ground-glass opacities in the right upper, right middle and left upper lobes as seen on coronal images. The findings could represent organizing pneumonia given the history of lupus and/or an atypical/viral pneumonia.  No pleural effusion. No adenopathy.  1.5 cm left adrenal well-circumscribed nodule, new since 6/3/2014. Recommend MRI of the abdomen with IV contrast.  0.8 cm right hepatic lobe hypodensity, incompletely evaluated. This can be assessed on the same MRI.  Chest findings discussed with Dr. Jiménez at 3:00 PM on 4/7/2020.  < end of copied text >

## 2020-04-07 NOTE — H&P ADULT - ATTENDING COMMENTS
I saw and evaluated the patient. I have reviewed and agree with the findings and plan of care as documented above in the resident’s note (unless indicated differently below). Any necessary changes were made in the body of the text.    60 yo F pt p/w worsening coughing and fever (Tmax of 103.7 w/ daily recurrence of symptoms). Associated w/ chills, frontal headaches (severe and aching, b/l, frontal and not associated w/ any auras), weakness, fatigue and anorexia. Also reports chest pain on inspiration and w/ coughing and positional change. Radiates to the back. Moderately severe and intermittent. Her mother tested positive for covid-19. Pt has been tested twice and has been negative. Of note, the patient has a history of lupus and is on chronic hydroxychloroquine therapy (although she has been non compliant).    ROS:  Constitutional: +fevers; +chills; + weakness  Eyes: no conjunctivitis or itching  ENT: no dysphagia; no odynophagia  CVS: no LE swelling or PND  Resp: +SOB; +coughing  GI: + nausea; no vomiting; no diarrhea  : no dysuria; no hematuria  MSK: no arthralgias; +myalgias  Neuro: +headache; no focal weakness  Skin: no skin rashes  All other systems reviewed and are negative    PMHx, PSurHx, FHx and Social history as well as home medications as included in the appropriate sections of the note above – edited where appropriate    Exam:  Vitals: BP = 126/80; P = 91; T = 99; RR = 19; O2 sat = 97  General: resting in bed; appears uncomfortable; pleasant and cooperative   Eyes: anicteric sclerae; moist conjunctiva  HENT: AT/NC; clear oropharynx; no ulcers or exudates  Neck: supple; trachea midline  Lungs: lungs w/ scattered rales; no accessory muscle use  CVS: RRR; S1 and S2 w/o MRG’s  Abd: BS+; soft and non-tender to palpation; no masses or HSM  Extremities: LE’s non-edematous; peripheral pulses 2+ b/l  Psych: A&Ox4; appropriate affect    Labs significant for leukocytosis, normocytic anemia, thrombocytosis, elevated inflammatory markers, transaminitis  Imaging: no PE, atelectasis/consolidation at lung bases w/ ground glass, adrenal nodule  EKG: sinus tach: qTC 441    Assessment:  (1) Fever, SOB and constitutional symptoms 2/2 possible viral respiratory syndrome vs. possible superimposed severe CAP vs. possible lupus flare / lupus associated pulmonary manifestations  (2) Hypothyroidism  (3) Left adrenal nodule and hepatic hypodensity – abdominal MRI can evaluate both    Plan:  (1) Check CBC, ESR, CRP, U/A (for proteinuria, cellular casts or hematuria), spot urine protein:creatinine ratio, anti-dsDNA, C3 and C4 levels  (2) Please continue cefepime and doxycycline – monitor for clinical improvement  (3) Restart hydroxychloroquine  (4) Rheumatology evaluation  (5) TTE  (6) Symptomatic management; pain control; hydration; antipyretics; anti-tussives  (7) Meds as dosed; to consider steroids if no improvement with antibiotic therapy    Code status: full code

## 2020-04-07 NOTE — ED PROVIDER NOTE - ATTENDING CONTRIBUTION TO CARE
I personally evaluated the patient. I reviewed the Resident’s or Physician Assistant’s note (as assigned above), and agree with the findings and plan except as documented in my note.    Pt is a 58 y/o female with PMH of SLE on plaquenil, hypothyroidism, with recent admission for r/o COVID presents to ED for worsening SOB over the past weeks. + cough, fever Tmax 103, body aches, headache, nausea, chest pain. No diarrhea, vomiting. Pt denies hx of blood clots.    Constitutional: Well developed, well nourished. NAD.  Head: Normocephalic, atraumatic.  Eyes: PERRL. EOMI.  ENT: No nasal discharge. Mucous membranes moist.  Neck: Supple. Painless ROM.  Cardiovascular: Normal S1, S2. Tachycardic, regular. No murmurs, rubs, or gallops.  Pulmonary: Normal respiratory rate and effort. Lungs clear to auscultation bilaterally. No wheezing, rales, or rhonchi.  Abdominal: Soft. Nondistended. Nontender. No rebound, guarding, rigidity.  Extremities. Pelvis stable. No lower extremity edema, symmetric calves.  Skin: No rashes, cyanosis.  Neuro: AAOx3. No focal neurological deficits.  Psych: Normal mood. Normal affect.

## 2020-04-07 NOTE — ED PROVIDER NOTE - CLINICAL SUMMARY MEDICAL DECISION MAKING FREE TEXT BOX
Pt with hx of SLE presented for SOB, fever, abd pain, n/v. Labs, imaging including CTA chest, CT a/p obtained. COVID testing sent, CT findings concerning for COVID. Pt hypoxic on RA to upper 80s at rest. Will admit for further management. Endorsed to medicine.

## 2020-04-07 NOTE — ED PROVIDER NOTE - CARE PLAN
Principal Discharge DX:	Shortness of breath  Secondary Diagnosis:	Ground glass opacity present on imaging of lung  Secondary Diagnosis:	Hypoxia

## 2020-04-07 NOTE — ED PROVIDER NOTE - PHYSICAL EXAMINATION
CONSTITUTIONAL: WA / WN / NAD  HEAD: NCAT  EYES: PERRL; EOMI;   ENT: Normal pharynx; mucous membranes pink/moist, no erythema.  NECK: Supple; no meningeal signs  CARD: RRR; nl S1/S2; no M/R/G.   RESP: Respiratory rate and effort are normal; breath sounds clear and equal bilaterally.  ABD: Soft, ND +llq ttp  MSK/EXT: No gross deformities; full range of motion.  SKIN: Warm and dry;   NEURO: AAOx3  PSYCH: Memory Intact, Normal Affect

## 2020-04-07 NOTE — H&P ADULT - HISTORY OF PRESENT ILLNESS
59 year old female with a past medical history of SLE, Hypothyroidism, recent hospital admission with negative COVID-19 PCR discharged 4/2/2020, presenting for worsening cough and fever.      ED: BIBA. /66, , RR 22, T 100.6F, SpO2 99% on 4LNC. Given tylenol 975mg, Doxycycline 100mg, Cefepime 2000mg. EKG sinus tachycardia. CT negative for PE, showing atelectasis/consolidation at bases & multifocal GGO RUL/RML/SAUNDRA. 59 year old female with a past medical history of SLE on HCQ daily, Hypothyroidism, recent hospital admission with negative COVID-19 PCR discharged 4/2/2020, presenting for worsening cough and fever. Patient reports that since discharge she has been experiencing daily fevers with a Tmax of 103.7F usually at night, associated with chills, frontal headaches, generalized malaise and decreased PO intake to solids and liquids.  Patient reports that she has been experiencing chest pain on inspiration described as pressure like, localized to her sternum radiating to her left posterior chest, exacerbated with movement like going up or down the stairs, associated with shortness of breath at rest and on ambulation.  Patient was recently admitted for similar symptoms including tachypnea, and has been to an Urgent Care were she received azithromycin with no improvement.  Patient reports that she has been tested for COVID-19 prior to her hospitalization and she has been negative twice.  She endorses that her mother is COVID-19 positive and has been hospitalized.    Patient reports that she has not been consistent with her daily  hydroxychloroquine for the past month and a half because she was asymptomatic and thought she could stop the medication on her own without consulting her physician.  Patient finished a course of HCQ during her prior hospital stay.    ED: BIBA. /66, , RR 22, T 100.6F, SpO2 99% on 4LNC. Given tylenol 975mg, Doxycycline 100mg, Cefepime 2000mg, NS 1L. EKG sinus tachycardia. CT negative for PE, showing atelectasis/consolidation at bases & multifocal GGO RUL/RML/SAUNDRA.

## 2020-04-08 LAB
ALBUMIN SERPL ELPH-MCNC: 2.3 G/DL — LOW (ref 3.5–5.2)
ALP SERPL-CCNC: 115 U/L — SIGNIFICANT CHANGE UP (ref 30–115)
ALT FLD-CCNC: 89 U/L — HIGH (ref 0–41)
ANION GAP SERPL CALC-SCNC: 11 MMOL/L — SIGNIFICANT CHANGE UP (ref 7–14)
AST SERPL-CCNC: 96 U/L — HIGH (ref 0–41)
BASOPHILS # BLD AUTO: 0.02 K/UL — SIGNIFICANT CHANGE UP (ref 0–0.2)
BASOPHILS NFR BLD AUTO: 0.1 % — SIGNIFICANT CHANGE UP (ref 0–1)
BILIRUB SERPL-MCNC: 0.5 MG/DL — SIGNIFICANT CHANGE UP (ref 0.2–1.2)
BUN SERPL-MCNC: 37 MG/DL — HIGH (ref 10–20)
CALCIUM SERPL-MCNC: 9.1 MG/DL — SIGNIFICANT CHANGE UP (ref 8.5–10.1)
CHLORIDE SERPL-SCNC: 106 MMOL/L — SIGNIFICANT CHANGE UP (ref 98–110)
CK SERPL-CCNC: 38 U/L — SIGNIFICANT CHANGE UP (ref 0–225)
CO2 SERPL-SCNC: 26 MMOL/L — SIGNIFICANT CHANGE UP (ref 17–32)
CREAT SERPL-MCNC: 0.5 MG/DL — LOW (ref 0.7–1.5)
CRP SERPL-MCNC: 14.76 MG/DL — HIGH (ref 0–0.4)
D DIMER BLD IA.RAPID-MCNC: 1173 NG/ML DDU — HIGH (ref 0–230)
EOSINOPHIL # BLD AUTO: 0.01 K/UL — SIGNIFICANT CHANGE UP (ref 0–0.7)
EOSINOPHIL NFR BLD AUTO: 0.1 % — SIGNIFICANT CHANGE UP (ref 0–8)
ERYTHROCYTE [SEDIMENTATION RATE] IN BLOOD: 65 MM/HR — HIGH (ref 0–20)
FERRITIN SERPL-MCNC: 389 NG/ML — HIGH (ref 15–150)
FIBRINOGEN PPP-MCNC: 538 MG/DL — SIGNIFICANT CHANGE UP (ref 204.4–570.6)
GLUCOSE SERPL-MCNC: 248 MG/DL — HIGH (ref 70–99)
HCT VFR BLD CALC: 33.3 % — LOW (ref 37–47)
HGB BLD-MCNC: 11.1 G/DL — LOW (ref 12–16)
IMM GRANULOCYTES NFR BLD AUTO: 1.4 % — HIGH (ref 0.1–0.3)
LDH SERPL L TO P-CCNC: 443 — HIGH (ref 50–242)
LYMPHOCYTES # BLD AUTO: 1.13 K/UL — LOW (ref 1.2–3.4)
LYMPHOCYTES # BLD AUTO: 6.5 % — LOW (ref 20.5–51.1)
MAGNESIUM SERPL-MCNC: 2.4 MG/DL — SIGNIFICANT CHANGE UP (ref 1.8–2.4)
MCHC RBC-ENTMCNC: 28.7 PG — SIGNIFICANT CHANGE UP (ref 27–31)
MCHC RBC-ENTMCNC: 33.3 G/DL — SIGNIFICANT CHANGE UP (ref 32–37)
MCV RBC AUTO: 86 FL — SIGNIFICANT CHANGE UP (ref 81–99)
MONOCYTES # BLD AUTO: 0.62 K/UL — HIGH (ref 0.1–0.6)
MONOCYTES NFR BLD AUTO: 3.6 % — SIGNIFICANT CHANGE UP (ref 1.7–9.3)
NEUTROPHILS # BLD AUTO: 15.32 K/UL — HIGH (ref 1.4–6.5)
NEUTROPHILS NFR BLD AUTO: 88.3 % — HIGH (ref 42.2–75.2)
NRBC # BLD: 0 /100 WBCS — SIGNIFICANT CHANGE UP (ref 0–0)
PHOSPHATE SERPL-MCNC: 2.6 MG/DL — SIGNIFICANT CHANGE UP (ref 2.1–4.9)
PLATELET # BLD AUTO: 427 K/UL — HIGH (ref 130–400)
POTASSIUM SERPL-MCNC: 4.5 MMOL/L — SIGNIFICANT CHANGE UP (ref 3.5–5)
POTASSIUM SERPL-SCNC: 4.5 MMOL/L — SIGNIFICANT CHANGE UP (ref 3.5–5)
PROCALCITONIN SERPL-MCNC: 0.09 NG/ML — SIGNIFICANT CHANGE UP (ref 0.02–0.1)
PROT SERPL-MCNC: 5.8 G/DL — LOW (ref 6–8)
RBC # BLD: 3.87 M/UL — LOW (ref 4.2–5.4)
RBC # FLD: 14.2 % — SIGNIFICANT CHANGE UP (ref 11.5–14.5)
SARS-COV-2 RNA SPEC QL NAA+PROBE: SIGNIFICANT CHANGE UP
SODIUM SERPL-SCNC: 143 MMOL/L — SIGNIFICANT CHANGE UP (ref 135–146)
WBC # BLD: 17.35 K/UL — HIGH (ref 4.8–10.8)
WBC # FLD AUTO: 17.35 K/UL — HIGH (ref 4.8–10.8)

## 2020-04-08 PROCEDURE — 99223 1ST HOSP IP/OBS HIGH 75: CPT | Mod: AI

## 2020-04-08 PROCEDURE — 71045 X-RAY EXAM CHEST 1 VIEW: CPT | Mod: 26

## 2020-04-08 RX ORDER — ENOXAPARIN SODIUM 100 MG/ML
56 INJECTION SUBCUTANEOUS EVERY 12 HOURS
Refills: 0 | Status: DISCONTINUED | OUTPATIENT
Start: 2020-04-08 | End: 2020-04-09

## 2020-04-08 RX ORDER — KETOROLAC TROMETHAMINE 30 MG/ML
30 SYRINGE (ML) INJECTION EVERY 6 HOURS
Refills: 0 | Status: DISCONTINUED | OUTPATIENT
Start: 2020-04-08 | End: 2020-04-10

## 2020-04-08 RX ORDER — ONDANSETRON 8 MG/1
4 TABLET, FILM COATED ORAL ONCE
Refills: 0 | Status: COMPLETED | OUTPATIENT
Start: 2020-04-08 | End: 2020-04-08

## 2020-04-08 RX ORDER — TRAMADOL HYDROCHLORIDE 50 MG/1
50 TABLET ORAL ONCE
Refills: 0 | Status: DISCONTINUED | OUTPATIENT
Start: 2020-04-08 | End: 2020-04-08

## 2020-04-08 RX ADMIN — Medication 112 MICROGRAM(S): at 05:43

## 2020-04-08 RX ADMIN — Medication 30 MILLIGRAM(S): at 11:06

## 2020-04-08 RX ADMIN — CEFEPIME 100 MILLIGRAM(S): 1 INJECTION, POWDER, FOR SOLUTION INTRAMUSCULAR; INTRAVENOUS at 17:19

## 2020-04-08 RX ADMIN — CEFEPIME 100 MILLIGRAM(S): 1 INJECTION, POWDER, FOR SOLUTION INTRAMUSCULAR; INTRAVENOUS at 05:43

## 2020-04-08 RX ADMIN — TRAMADOL HYDROCHLORIDE 50 MILLIGRAM(S): 50 TABLET ORAL at 08:25

## 2020-04-08 RX ADMIN — Medication 650 MILLIGRAM(S): at 03:15

## 2020-04-08 RX ADMIN — Medication 110 MILLIGRAM(S): at 17:19

## 2020-04-08 RX ADMIN — Medication 110 MILLIGRAM(S): at 06:58

## 2020-04-08 RX ADMIN — ONDANSETRON 4 MILLIGRAM(S): 8 TABLET, FILM COATED ORAL at 08:25

## 2020-04-08 RX ADMIN — ENOXAPARIN SODIUM 56 MILLIGRAM(S): 100 INJECTION SUBCUTANEOUS at 17:19

## 2020-04-08 RX ADMIN — Medication 200 MILLIGRAM(S): at 11:06

## 2020-04-08 NOTE — PROGRESS NOTE ADULT - SUBJECTIVE AND OBJECTIVE BOX
Communications and Discharge Team note  Spoke with Daughter Tracy Grigsby [phone]. Discussed with daughter patient current status. Patient COVID negative x3. CXR stable opacities, CT with ground glass, negative PE, negative troponins, negative BNP. Labs to rule out LUPUS pericarditis pending. Pending ID consult, Pending Rheum consult. Possibly starting on steroids. Current being treated with antibiotics and plaquenil. Pending ECHO.     Family concerned with 3rd COVID negative, if patient is sharing room with COVID positive patient. Also concerned patient was recently discharged and then progressively worsened.     Pharmacy: [facility] (unknown which branch)

## 2020-04-08 NOTE — PROGRESS NOTE ADULT - ASSESSMENT
Assessment  - possible viral respiratory syndrome vs. possible superimposed severe CAP vs. possible lupus flare / lupus associated pulmonary manifestations  - Hypothyroid    Plan  - TTE  - F/u labs: C3, C4 ESR. U/A  - ID consult  - DC nasal canula, start on room air  - Toradol for headache  - DC Lovenox 40mg. Started weight base Lovenox (56mg) Q12hrs  - Rheumatology consult  - Continue Plaquenil cefepime and doxycycline  - Meds as dosed; to consider steroids if no improvement with antibiotic therapy

## 2020-04-08 NOTE — CONSULT NOTE ADULT - SUBJECTIVE AND OBJECTIVE BOX
KEVIN SWIFT  59y, Female  Allergy: No Known Allergies      All historical available data reviewed.    HPI:  59 year old female with a past medical history of SLE on HCQ daily, Hypothyroidism, recent hospital admission with negative COVID-19 PCR discharged 4/2/2020, presenting for worsening cough and fever. Patient reports that since discharge she has been experiencing daily fevers with a Tmax of 103.7F usually at night, associated with chills, frontal headaches, generalized malaise and decreased PO intake to solids and liquids.  Patient reports that she has been experiencing chest pain on inspiration described as pressure like, localized to her sternum radiating to her left posterior chest, exacerbated with movement like going up or down the stairs, associated with shortness of breath at rest and on ambulation.  Patient was recently admitted for similar symptoms including tachypnea, and has been to an Urgent Care were she received azithromycin with no improvement.  Patient reports that she has been tested for COVID-19 prior to her hospitalization and she has been negative twice.  She endorses that her mother is COVID-19 positive and has been hospitalized.    Patient reports that she has not been consistent with her daily  hydroxychloroquine for the past month and a half because she was asymptomatic and thought she could stop the medication on her own without consulting her physician.  Patient finished a course of HCQ during her prior hospital stay.    ED: BIBA. /66, , RR 22, T 100.6F, SpO2 99% on 4LNC. Given tylenol 975mg, Doxycycline 100mg, Cefepime 2000mg, NS 1L. EKG sinus tachycardia. CT negative for PE, showing atelectasis/consolidation at bases & multifocal GGO RUL/RML/SAUNDRA. (07 Apr 2020 17:01)    FAMILY HISTORY:    PAST MEDICAL & SURGICAL HISTORY:  Hypothyroidism  SLE (systemic lupus erythematosus)  No significant past surgical history        VITALS:  T(F): 99.1, Max: 101.2 (04-07-20 @ 15:35)  HR: 90  BP: 115/58  RR: 19Vital Signs Last 24 Hrs  T(C): 37.3 (08 Apr 2020 12:02), Max: 38.4 (07 Apr 2020 15:35)  T(F): 99.1 (08 Apr 2020 12:02), Max: 101.2 (07 Apr 2020 15:35)  HR: 90 (08 Apr 2020 12:02) (90 - 117)  BP: 115/58 (08 Apr 2020 12:02) (115/58 - 138/73)  BP(mean): --  RR: 19 (08 Apr 2020 12:02) (19 - 20)  SpO2: 95% (08 Apr 2020 12:02) (89% - 97%)    TESTS & MEASUREMENTS:                        11.1   17.35 )-----------( 427      ( 08 Apr 2020 04:30 )             33.3     04-08    143  |  106  |  37<H>  ----------------------------<  248<H>  4.5   |  26  |  0.5<L>    Ca    9.1      08 Apr 2020 04:30  Phos  2.6     04-08  Mg     2.4     04-08    TPro  5.8<L>  /  Alb  2.3<L>  /  TBili  0.5  /  DBili  x   /  AST  96<H>  /  ALT  89<H>  /  AlkPhos  115  04-08    LIVER FUNCTIONS - ( 08 Apr 2020 04:30 )  Alb: 2.3 g/dL / Pro: 5.8 g/dL / ALK PHOS: 115 U/L / ALT: 89 U/L / AST: 96 U/L / GGT: x                   RADIOLOGY & ADDITIONAL TESTS:  Personal review of radiological diagnostics performed  Echo and EKG results noted when applicable.     MEDICATIONS:  acetaminophen   Tablet .. 650 milliGRAM(s) Oral every 4 hours PRN  benzonatate 100 milliGRAM(s) Oral every 8 hours PRN  cefepime   IVPB 2000 milliGRAM(s) IV Intermittent every 12 hours  doxycycline IVPB 100 milliGRAM(s) IV Intermittent every 12 hours  enoxaparin Injectable 56 milliGRAM(s) SubCutaneous every 12 hours  hydroxychloroquine 200 milliGRAM(s) Oral daily  ketorolac   Injectable 30 milliGRAM(s) IV Push every 6 hours PRN  levothyroxine 112 MICROGram(s) Oral daily  morphine  - Injectable 2 milliGRAM(s) IV Push every 4 hours PRN  sodium chloride 0.9%. 1000 milliLiter(s) IV Continuous <Continuous>      ANTIBIOTICS:  cefepime   IVPB 2000 milliGRAM(s) IV Intermittent every 12 hours  doxycycline IVPB 100 milliGRAM(s) IV Intermittent every 12 hours  hydroxychloroquine 200 milliGRAM(s) Oral daily

## 2020-04-08 NOTE — PROGRESS NOTE ADULT - SUBJECTIVE AND OBJECTIVE BOX
INTVERAL HPI/OVERNIGHT EVENTS:  59 year old female with PMH of SLE on HCQ daily.    Today is day 1 in the hospital. Patient seen and examined at beside. States she feels chest tightness with moving. When she does not move there is no chest pain. Admits to headache unrelieved by Tylenol. States she feels a little bit better today.     REVIEW OF SYSTEMS:  [ x ] A 10 Point Review of Systems was negative except where noted  [    ] Due to altered mental status/intubation, subjective information was not able to be obtained from the patient. History was obtained to the extent possible from review of the chart and collateral sources of information.     MEDICATIONS  (STANDING):  cefepime   IVPB 2000 milliGRAM(s) IV Intermittent every 12 hours  doxycycline IVPB 100 milliGRAM(s) IV Intermittent every 12 hours  enoxaparin Injectable 56 milliGRAM(s) SubCutaneous every 12 hours  hydroxychloroquine 200 milliGRAM(s) Oral daily  levothyroxine 112 MICROGram(s) Oral daily  sodium chloride 0.9%. 1000 milliLiter(s) (75 mL/Hr) IV Continuous <Continuous>    MEDICATIONS  (PRN):  acetaminophen   Tablet .. 650 milliGRAM(s) Oral every 4 hours PRN Temp greater or equal to 38.5C (101.3F)  benzonatate 100 milliGRAM(s) Oral every 8 hours PRN Cough  ketorolac   Injectable 30 milliGRAM(s) IV Push every 6 hours PRN Moderate Pain (4 - 6)  morphine  - Injectable 2 milliGRAM(s) IV Push every 4 hours PRN Severe Pain (7 - 10)    Vital Signs Last 24 Hrs  T(C): 37.3 (08 Apr 2020 12:02), Max: 38.4 (07 Apr 2020 15:35)  T(F): 99.1 (08 Apr 2020 12:02), Max: 101.2 (07 Apr 2020 15:35)  HR: 90 (08 Apr 2020 12:02) (90 - 117)  BP: 115/58 (08 Apr 2020 12:02) (115/58 - 138/73)  RR: 19 (08 Apr 2020 12:02) (19 - 20)  SpO2: 95% (08 Apr 2020 12:02) (89% - 97%)    LABS:                        11.1   17.35 )-----------( 427      ( 08 Apr 2020 04:30 )             33.3     04-08    143  |  106  |  37<H>  ----------------------------<  248<H>  4.5   |  26  |  0.5<L>    Ca    9.1      08 Apr 2020 04:30  Phos  2.6     04-08  Mg     2.4     04-08    TPro  5.8<L>  /  Alb  2.3<L>  /  TBili  0.5  /  DBili  x   /  AST  96<H>  /  ALT  89<H>  /  AlkPhos  115  04-08    PT/INR - ( 07 Apr 2020 11:27 )   PT: 14.50 sec;   INR: 1.26 ratio    PTT - ( 07 Apr 2020 11:27 )  PTT:31.7 sec    D-Dimer Assay, Qualitative: 1173

## 2020-04-09 DIAGNOSIS — M32.19 OTHER ORGAN OR SYSTEM INVOLVEMENT IN SYSTEMIC LUPUS ERYTHEMATOSUS: ICD-10-CM

## 2020-04-09 DIAGNOSIS — R50.9 FEVER, UNSPECIFIED: ICD-10-CM

## 2020-04-09 DIAGNOSIS — R09.02 HYPOXEMIA: ICD-10-CM

## 2020-04-09 DIAGNOSIS — R91.8 OTHER NONSPECIFIC ABNORMAL FINDING OF LUNG FIELD: ICD-10-CM

## 2020-04-09 DIAGNOSIS — R06.02 SHORTNESS OF BREATH: ICD-10-CM

## 2020-04-09 LAB
ALBUMIN SERPL ELPH-MCNC: 3.3 G/DL — LOW (ref 3.5–5.2)
ALP SERPL-CCNC: 56 U/L — SIGNIFICANT CHANGE UP (ref 30–115)
ALT FLD-CCNC: 9 U/L — SIGNIFICANT CHANGE UP (ref 0–41)
ANION GAP SERPL CALC-SCNC: 12 MMOL/L — SIGNIFICANT CHANGE UP (ref 7–14)
AST SERPL-CCNC: 15 U/L — SIGNIFICANT CHANGE UP (ref 0–41)
BILIRUB SERPL-MCNC: 0.4 MG/DL — SIGNIFICANT CHANGE UP (ref 0.2–1.2)
BUN SERPL-MCNC: 9 MG/DL — LOW (ref 10–20)
C3 SERPL-MCNC: 158 MG/DL — HIGH (ref 81–157)
C4 SERPL-MCNC: 36 MG/DL — SIGNIFICANT CHANGE UP (ref 13–39)
CALCIUM SERPL-MCNC: 8.8 MG/DL — SIGNIFICANT CHANGE UP (ref 8.5–10.1)
CHLORIDE SERPL-SCNC: 107 MMOL/L — SIGNIFICANT CHANGE UP (ref 98–110)
CO2 SERPL-SCNC: 22 MMOL/L — SIGNIFICANT CHANGE UP (ref 17–32)
CREAT SERPL-MCNC: 0.5 MG/DL — LOW (ref 0.7–1.5)
CRP SERPL-MCNC: 11.22 MG/DL — HIGH (ref 0–0.4)
D DIMER BLD IA.RAPID-MCNC: 247 NG/ML DDU — HIGH (ref 0–230)
FERRITIN SERPL-MCNC: 614 NG/ML — HIGH (ref 15–150)
GLUCOSE SERPL-MCNC: 81 MG/DL — SIGNIFICANT CHANGE UP (ref 70–99)
HCT VFR BLD CALC: 30.7 % — LOW (ref 37–47)
HGB BLD-MCNC: 10.1 G/DL — LOW (ref 12–16)
LDH SERPL L TO P-CCNC: 183 — SIGNIFICANT CHANGE UP (ref 50–242)
MAGNESIUM SERPL-MCNC: 1.9 MG/DL — SIGNIFICANT CHANGE UP (ref 1.8–2.4)
MCHC RBC-ENTMCNC: 30.1 PG — SIGNIFICANT CHANGE UP (ref 27–31)
MCHC RBC-ENTMCNC: 32.9 G/DL — SIGNIFICANT CHANGE UP (ref 32–37)
MCV RBC AUTO: 91.4 FL — SIGNIFICANT CHANGE UP (ref 81–99)
NRBC # BLD: 0 /100 WBCS — SIGNIFICANT CHANGE UP (ref 0–0)
PLATELET # BLD AUTO: 395 K/UL — SIGNIFICANT CHANGE UP (ref 130–400)
POTASSIUM SERPL-MCNC: 4.6 MMOL/L — SIGNIFICANT CHANGE UP (ref 3.5–5)
POTASSIUM SERPL-SCNC: 4.6 MMOL/L — SIGNIFICANT CHANGE UP (ref 3.5–5)
PROT SERPL-MCNC: 6 G/DL — SIGNIFICANT CHANGE UP (ref 6–8)
RBC # BLD: 3.36 M/UL — LOW (ref 4.2–5.4)
RBC # FLD: 11.4 % — LOW (ref 11.5–14.5)
SODIUM SERPL-SCNC: 141 MMOL/L — SIGNIFICANT CHANGE UP (ref 135–146)
WBC # BLD: 3.48 K/UL — LOW (ref 4.8–10.8)
WBC # FLD AUTO: 3.48 K/UL — LOW (ref 4.8–10.8)

## 2020-04-09 PROCEDURE — 99254 IP/OBS CNSLTJ NEW/EST MOD 60: CPT

## 2020-04-09 PROCEDURE — 93306 TTE W/DOPPLER COMPLETE: CPT | Mod: 26

## 2020-04-09 PROCEDURE — 99233 SBSQ HOSP IP/OBS HIGH 50: CPT

## 2020-04-09 RX ORDER — ENOXAPARIN SODIUM 100 MG/ML
30 INJECTION SUBCUTANEOUS EVERY 12 HOURS
Refills: 0 | Status: DISCONTINUED | OUTPATIENT
Start: 2020-04-09 | End: 2020-04-10

## 2020-04-09 RX ORDER — HYDROXYCHLOROQUINE SULFATE 200 MG
400 TABLET ORAL EVERY 12 HOURS
Refills: 0 | Status: COMPLETED | OUTPATIENT
Start: 2020-04-09 | End: 2020-04-10

## 2020-04-09 RX ORDER — DIPHENHYDRAMINE HCL 50 MG
25 CAPSULE ORAL ONCE
Refills: 0 | Status: COMPLETED | OUTPATIENT
Start: 2020-04-09 | End: 2020-04-09

## 2020-04-09 RX ORDER — HYDROXYCHLOROQUINE SULFATE 200 MG
200 TABLET ORAL EVERY 12 HOURS
Refills: 0 | Status: DISCONTINUED | OUTPATIENT
Start: 2020-04-10 | End: 2020-04-10

## 2020-04-09 RX ADMIN — Medication 400 MILLIGRAM(S): at 17:39

## 2020-04-09 RX ADMIN — SODIUM CHLORIDE 75 MILLILITER(S): 9 INJECTION INTRAMUSCULAR; INTRAVENOUS; SUBCUTANEOUS at 22:46

## 2020-04-09 RX ADMIN — Medication 30 MILLIGRAM(S): at 22:46

## 2020-04-09 RX ADMIN — Medication 30 MILLIGRAM(S): at 11:20

## 2020-04-09 RX ADMIN — Medication 25 MILLIGRAM(S): at 14:27

## 2020-04-09 RX ADMIN — Medication 110 MILLIGRAM(S): at 06:53

## 2020-04-09 RX ADMIN — ENOXAPARIN SODIUM 56 MILLIGRAM(S): 100 INJECTION SUBCUTANEOUS at 05:57

## 2020-04-09 RX ADMIN — Medication 110 MILLIGRAM(S): at 18:42

## 2020-04-09 RX ADMIN — CEFEPIME 100 MILLIGRAM(S): 1 INJECTION, POWDER, FOR SOLUTION INTRAMUSCULAR; INTRAVENOUS at 05:56

## 2020-04-09 RX ADMIN — Medication 112 MICROGRAM(S): at 05:56

## 2020-04-09 RX ADMIN — MORPHINE SULFATE 2 MILLIGRAM(S): 50 CAPSULE, EXTENDED RELEASE ORAL at 13:56

## 2020-04-09 RX ADMIN — Medication 200 MILLIGRAM(S): at 11:20

## 2020-04-09 RX ADMIN — CEFEPIME 100 MILLIGRAM(S): 1 INJECTION, POWDER, FOR SOLUTION INTRAMUSCULAR; INTRAVENOUS at 17:39

## 2020-04-09 RX ADMIN — Medication 30 MILLIGRAM(S): at 01:15

## 2020-04-09 NOTE — PROGRESS NOTE ADULT - SUBJECTIVE AND OBJECTIVE BOX
SUBJECTIVE:    Patient is a 59y old  Female who presents with a chief complaint of worsening cough and fever (08 Apr 2020 13:28)    Currently admitted to medicine with the primary diagnosis of Shortness of breath     Today is hospital day 2d. Patient was seen and examined at bedside. This morning she is resting comfortably in bed and reports no new issues or overnight events. Endorsed improvement of dyspnea since admission.     PAST MEDICAL & SURGICAL HISTORY  PAST MEDICAL & SURGICAL HISTORY:  Hypothyroidism  SLE (systemic lupus erythematosus)  No significant past surgical history    SOCIAL HISTORY:    ALLERGIES:  No Known Allergies    MEDICATIONS:  STANDING MEDICATIONS  cefepime   IVPB 2000 milliGRAM(s) IV Intermittent every 12 hours  doxycycline IVPB 100 milliGRAM(s) IV Intermittent every 12 hours  enoxaparin Injectable 30 milliGRAM(s) SubCutaneous every 12 hours  hydroxychloroquine 200 milliGRAM(s) Oral daily  levothyroxine 112 MICROGram(s) Oral daily  sodium chloride 0.9%. 1000 milliLiter(s) IV Continuous <Continuous>    PRN MEDICATIONS  acetaminophen   Tablet .. 650 milliGRAM(s) Oral every 4 hours PRN  benzonatate 100 milliGRAM(s) Oral every 8 hours PRN  ketorolac   Injectable 30 milliGRAM(s) IV Push every 6 hours PRN  morphine  - Injectable 2 milliGRAM(s) IV Push every 4 hours PRN    VITALS:   T(F): 98  HR: 78  BP: 121/65  RR: 18  SpO2: 95%    LABS:                        10.1   3.48  )-----------( 395      ( 09 Apr 2020 05:41 )             30.7     04-09    141  |  107  |  9<L>  ----------------------------<  81  4.6   |  22  |  0.5<L>    Ca    8.8      09 Apr 2020 05:41  Phos  2.6     04-08  Mg     1.9     04-09    TPro  6.0  /  Alb  3.3<L>  /  TBili  0.4  /  DBili  x   /  AST  15  /  ALT  9   /  AlkPhos  56  04-09              CARDIAC MARKERS ( 08 Apr 2020 04:30 )  x     / x     / 38 U/L / x     / x          RADIOLOGY:  < from: Xray Chest 1 View- PORTABLE-Routine (04.08.20 @ 07:45) >    IMPRESSION:     Unchanged bibasilar opacities.    < end of copied text >    PHYSICAL EXAM:  GENERAL: NAD, speaks in full sentences, no signs of respiratory distress  HEAD: Atraumatic  CHEST/LUNG: Clear to auscultation bilaterally  HEART: S1, S2; RRR; No murmurs, rubs, or gallops  ABDOMEN: BS+; Soft, Non-tender, Non-distended  EXTREMITIES:  2+ Peripheral Pulses  PSYCH: AAOx3 SUBJECTIVE:    Patient is a 59y old  Female who presents with a chief complaint of worsening cough and fever (08 Apr 2020 13:28)    Currently admitted to medicine with the primary diagnosis of Shortness of breath     Today is hospital day 2d. Patient was seen and examined at bedside. This morning she is resting comfortably in bed and reports no new issues or overnight events. Endorsed improvement of dyspnea since admission.     Attending: Pt seen and examined at bedside. Complained of mild sob and myalgias     PAST MEDICAL & SURGICAL HISTORY  PAST MEDICAL & SURGICAL HISTORY:  Hypothyroidism  SLE (systemic lupus erythematosus)  No significant past surgical history    SOCIAL HISTORY:    ALLERGIES:  No Known Allergies    MEDICATIONS:  STANDING MEDICATIONS  cefepime   IVPB 2000 milliGRAM(s) IV Intermittent every 12 hours  doxycycline IVPB 100 milliGRAM(s) IV Intermittent every 12 hours  enoxaparin Injectable 30 milliGRAM(s) SubCutaneous every 12 hours  hydroxychloroquine 200 milliGRAM(s) Oral daily  levothyroxine 112 MICROGram(s) Oral daily  sodium chloride 0.9%. 1000 milliLiter(s) IV Continuous <Continuous>    PRN MEDICATIONS  acetaminophen   Tablet .. 650 milliGRAM(s) Oral every 4 hours PRN  benzonatate 100 milliGRAM(s) Oral every 8 hours PRN  ketorolac   Injectable 30 milliGRAM(s) IV Push every 6 hours PRN  morphine  - Injectable 2 milliGRAM(s) IV Push every 4 hours PRN    VITALS:   T(F): 98  HR: 78  BP: 121/65  RR: 18  SpO2: 95%    LABS:                        10.1   3.48  )-----------( 395      ( 09 Apr 2020 05:41 )             30.7     04-09    141  |  107  |  9<L>  ----------------------------<  81  4.6   |  22  |  0.5<L>    Ca    8.8      09 Apr 2020 05:41  Phos  2.6     04-08  Mg     1.9     04-09    TPro  6.0  /  Alb  3.3<L>  /  TBili  0.4  /  DBili  x   /  AST  15  /  ALT  9   /  AlkPhos  56  04-09              CARDIAC MARKERS ( 08 Apr 2020 04:30 )  x     / x     / 38 U/L / x     / x          RADIOLOGY:  < from: Xray Chest 1 View- PORTABLE-Routine (04.08.20 @ 07:45) >    IMPRESSION:     Unchanged bibasilar opacities.    < end of copied text >    PHYSICAL EXAM:  GENERAL: NAD, speaks in full sentences, no signs of respiratory distress  HEAD: Atraumatic  CHEST/LUNG: Clear to auscultation bilaterally  HEART: S1, S2; RRR; No murmurs, rubs, or gallops  ABDOMEN: BS+; Soft, Non-tender, Non-distended  EXTREMITIES:  2+ Peripheral Pulses  PSYCH: AAOx3

## 2020-04-09 NOTE — CONSULT NOTE ADULT - ASSESSMENT
Please refer to my note below - pt seen without resident
60 y/o female recently rested negative for COVID 19 admitted with complaints of cough fever and found to have abnornmal ct scan suggestive of Covid 19 Viral Pneumonia  ALso with know hx of SLe (doubt Flare)      Fever  Cough  Abnormal Ct Scan (more likely Viral Pna, less likely )  Viral Pneumonia Probable secondary to COVID 19 although repeatedly negative testing   H/O SLE    lypmphopenia, abnormal ct scan, clinical hx- more suggestive of COVID 19  procalciton 0.06, d/c abx- no suspected bacterial infection  CRp 3.9  qtc 441  Plaquenil x 5 daystotal  monitor 02 sat- currently 95 percent on ra  respiratory and contact isolation  dvt/gi px  repeat imaging as outpt in 6 week if feasible, if not then once pandemic is over  d/w out pt primary attending
59 year old female with a past medical history of SLE on HCQ daily, Hypothyroidism, recent hospital admission with negative COVID-19 PCR discharged 4/2/2020, presenting for worsening cough and fever. Patient reports that since discharge she has been experiencing daily fevers with a Tmax of 103.7F usually at night, associated with chills, frontal headaches, generalized malaise and decreased PO intake to solids and liquids.  Patient reports that she has been experiencing chest pain on inspiration described as pressure like, localized to her sternum radiating to her left posterior chest, exacerbated with movement like going up or down the stairs, associated with shortness of breath at rest and on ambulation.  Patient was recently admitted for similar symptoms including tachypnea, and has been to an Urgent Care were she received azithromycin with no improvement.  Patient reports that she has been tested for COVID-19 prior to her hospitalization and she has been negative twice.  She endorses that her mother is COVID-19 positive and has been hospitalized.    Patient reports that she has not been consistent with her daily  hydroxychloroquine for the past month and a half because she was asymptomatic and thought she could stop the medication on her own without consulting her physician.  Patient finished a course of HCQ during her prior hospital stay.    IMPRESSION;   COVID 19 repeatedly NG but the clinical Hx, symptoms CT chest , lymphopenia all characteristic of COVID-19   procalcitonin 0.06  , not suggestive of a bacterial PNA.   CRP 3.9    RECOMMENDATIONS;    PLAQUENIL 400mg PO q12h x 2 doses, then 200mg BID PO x 4 days   -if QTc<500ms then start HCQ. No monitoring or serial EKGs required.  -if QTC>500ms then monitor EKG. Use [facility]. If tele/[facility]OT no serial EKGs  -d/c for QTc>550

## 2020-04-09 NOTE — CONSULT NOTE ADULT - ATTENDING COMMENTS
60 yo F with a PMH of SLE intermittently on HCQ, hypothyroidism, and recent hospital admission with negative COVID-19 PCR discharged 4/2/2020 who was re-admitted for worsening cough, chest tightness, and daily high grade fever found to have GGOs on CTA chest concerning for organizing pneumonia and/or atypical/viral pneumonia. Rheumatology was consulted due to concern for SLE activity contributing to her current symptoms. Pt was diagnosed with SLE based on symptoms of fatigue and small joint arthralgias with serologies reportedly demonstrating pos JEN and elevated dsDNA. She is clinically improving, currently off supplemental O2. She has multiple covid pos family members (lives with son who was asymptomatic but tested positive). ID feels her presentation is c/w covid-19 dx despite multiple negative tests. Our testing for covid is known to have potential false negatives, and I agree that her presentation is more c/w covid-19 than severe pulmonary manifestations of SLE. High grade fevers are certainly more c/w covid-19 than SLE flares. Recovery from covid-19 may take additional time. Her current serologies reflect a lower dsDNA than what she showed me via quest website (prior was 78 now 36) - dsDNA may reflect disease activity and in this case showed improvement. She did not have hypocomplementemia at dx and doesn't now - this value may not be helpful for her in determining SLE activity. Given clinical improvement and more likely dx of covid-19 I would not advocate for initiation of steroids at this time. It is possible to have SLE flare instigated by infectious process, but I don't have a high suspicion for that currently. I would recommend repeat of pulmonary imaging once symptoms have improved in the outpatient setting since her findings may be seen with viral pneumonia or with certain manifestations of SLE to ensure resolution. Please also obtain UA/urine protein creat ratio for completeness - last outpatient UA per [facility] did not show proteinuria or hematuria but this can be helpful to complete as well when assessing for SLE activity. She requested to follow up with me as an outpatient - outpatient information was provided to the patient. Please reach out with any additional questions/concerns. 58 yo F with a PMH of SLE intermittently on HCQ, hypothyroidism, and recent hospital admission with negative COVID-19 PCR discharged 4/2/2020 who was re-admitted for worsening cough, chest tightness, and daily high grade fever found to have GGOs on CTA chest concerning for organizing pneumonia and/or atypical/viral pneumonia. Rheumatology was consulted due to concern for SLE activity contributing to her current symptoms. Pt was diagnosed with SLE based on symptoms of fatigue and small joint arthralgias with serologies reportedly demonstrating pos JEN and elevated dsDNA. She is clinically improving, currently off supplemental O2. She has multiple covid pos family members (lives with son who was asymptomatic but tested positive). ID feels her presentation is c/w covid-19 dx despite multiple negative tests. Our testing for covid is known to have potential false negatives, and I agree that her presentation is more c/w covid-19 than severe pulmonary manifestations of SLE. High grade fevers are certainly more c/w covid-19 than SLE flares. Recovery from covid-19 may take additional time. Her current serologies reflect a lower dsDNA than what she showed me via quest website (prior was 78 now 35) - dsDNA may reflect disease activity and in this case showed improvement. She did not have hypocomplementemia at dx and doesn't now - this value may not be helpful for her in determining SLE activity. Given clinical improvement and more likely dx of covid-19 I would not advocate for initiation of steroids at this time. It is possible to have SLE flare instigated by infectious process, but I don't have a high suspicion for that currently. I would recommend repeat of pulmonary imaging once symptoms have improved in the outpatient setting since her findings may be seen with viral pneumonia or with certain manifestations of SLE to ensure resolution. Please also obtain UA/urine protein creat ratio for completeness - last outpatient UA per [facility] did not show proteinuria or hematuria but this can be helpful to complete as well when assessing for SLE activity. Should continue  mg daily as an outpatient for SLE management. She requested to follow up with me as an outpatient - outpatient information was provided to the patient. Please reach out with any additional questions/concerns.

## 2020-04-09 NOTE — CONSULT NOTE ADULT - SUBJECTIVE AND OBJECTIVE BOX
KEVIN SWIFT  MRN-[id number]    HISTORY OF PRESENT ILLNESS:  HPI:  59 year old female with a past medical history of SLE on HCQ daily, Hypothyroidism, recent hospital admission with negative COVID-19 PCR discharged 4/2/2020, presenting for worsening cough and fever. Patient reports that since discharge she has been experiencing daily fevers with a Tmax of 103.7F usually at night, associated with chills, frontal headaches, generalized malaise and decreased PO intake to solids and liquids.  Patient reports that she has been experiencing chest pain on inspiration described as pressure like, localized to her sternum radiating to her left posterior chest, exacerbated with movement like going up or down the stairs, associated with shortness of breath at rest and on ambulation.  Patient was recently admitted for similar symptoms including tachypnea, and has been to an Urgent Care were she received azithromycin with no improvement.  Patient reports that she has been tested for COVID-19 prior to her hospitalization and she has been negative twice.  She endorses that her mother is COVID-19 positive and has been hospitalized.    Patient reports that she has not been consistent with her daily  hydroxychloroquine for the past month and a half because she was asymptomatic and thought she could stop the medication on her own without consulting her physician.  Patient finished a course of HCQ during her prior hospital stay.    ED: BIBA. /66, , RR 22, T 100.6F, SpO2 99% on 4LNC. Given tylenol 975mg, Doxycycline 100mg, Cefepime 2000mg, NS 1L. EKG sinus tachycardia. CT negative for PE, showing atelectasis/consolidation at bases & multifocal GGO RUL/RML/SAUNDRA. (07 Apr 2020 17:01)      PMH/PSH:  PAST MEDICAL & SURGICAL HISTORY:  Hypothyroidism  SLE (systemic lupus erythematosus)  No significant past surgical history    ALLERGIES:  Allergies    No Known Allergies    Intolerances      SOCIAL HABITS:    FAMILY HISTORY:   FAMILY HISTORY:      REVIEW OF SYSTEM:  Elements of review of systems are negative or non-applicable except as noted above in HPI section.       HOME MEDICATIONS:  hydroxychloroquine 200 mg oral tablet  Synthroid 112 mcg (0.112 mg) oral tablet    MEDICATIONS:  MEDICATIONS  (STANDING):  cefepime   IVPB 2000 milliGRAM(s) IV Intermittent every 12 hours  doxycycline IVPB 100 milliGRAM(s) IV Intermittent every 12 hours  enoxaparin Injectable 30 milliGRAM(s) SubCutaneous every 12 hours  hydroxychloroquine 400 milliGRAM(s) Oral every 12 hours  levothyroxine 112 MICROGram(s) Oral daily  sodium chloride 0.9%. 1000 milliLiter(s) (75 mL/Hr) IV Continuous <Continuous>    MEDICATIONS  (PRN):  acetaminophen   Tablet .. 650 milliGRAM(s) Oral every 4 hours PRN Temp greater or equal to 38.5C (101.3F)  benzonatate 100 milliGRAM(s) Oral every 8 hours PRN Cough  ketorolac   Injectable 30 milliGRAM(s) IV Push every 6 hours PRN Moderate Pain (4 - 6)  morphine  - Injectable 2 milliGRAM(s) IV Push every 4 hours PRN Severe Pain (7 - 10)        VITALS:   Vital Signs Last 24 Hrs  T(C): 37.1 (09 Apr 2020 13:00), Max: 37.1 (08 Apr 2020 19:41)  T(F): 98.8 (09 Apr 2020 13:00), Max: 98.8 (09 Apr 2020 13:00)  HR: 82 (09 Apr 2020 13:00) (78 - 84)  BP: 140/84 (09 Apr 2020 13:00) (121/65 - 140/84)  BP(mean): --  RR: 18 (09 Apr 2020 13:00) (18 - 19)  SpO2: 95% (08 Apr 2020 19:41) (95% - 95%)        PHYSICAL EXAM:    GENERAL: NAD  HEAD:  Atraumatic, Normocephalic  NECK: Supple, No JVD  CHEST/LUNG: Clear to auscultation bilaterally;   HEART: Regular rate and rhythm; No murmurs  ABDOMEN: Soft, Nontender, Nondistended  EXTREMITIES:  Good peripheral Pulses, No clubbing, cyanosis, or edema      LABS:                        10.1   3.48  )-----------( 395      ( 09 Apr 2020 05:41 )             30.7     04-09    141  |  107  |  9<L>  ----------------------------<  81  4.6   |  22  |  0.5<L>    Ca    8.8      09 Apr 2020 05:41  Phos  2.6     04-08  Mg     1.9     04-09    TPro  6.0  /  Alb  3.3<L>  /  TBili  0.4  /  DBili  x   /  AST  15  /  ALT  9   /  AlkPhos  56  04-09    LIVER FUNCTIONS - ( 09 Apr 2020 05:41 )  Alb: 3.3 g/dL / Pro: 6.0 g/dL / ALK PHOS: 56 U/L / ALT: 9 U/L / AST: 15 U/L / GGT: x           CARDIAC MARKERS ( 08 Apr 2020 04:30 )  x     / x     / 38 U/L / x     / x            D-Dimer Assay, Quantitative: 247 ng/mL DDU (04-09-20 @ 05:41)  Ferritin, Serum: 614 ng/mL (04-08-20 @ 04:30)            ABG & VENT SETTINGS (when applicable)        DIAGNOSTIC STUDIES: KEVIN SWIFT  MRN-[id number]    HISTORY OF PRESENT ILLNESS:  HPI:  59 year old female with a past medical history of SLE on HCQ daily, Hypothyroidism, recent hospital admission with negative COVID-19 PCR discharged 4/2/2020, presenting for worsening cough and fever. Patient reports that since discharge she has been experiencing daily fevers with a Tmax of 103.7F usually at night, associated with chills, frontal headaches, generalized malaise and decreased PO intake to solids and liquids.  Patient reports that she has been experiencing chest pain on inspiration described as pressure like, localized to her sternum radiating to her left posterior chest, exacerbated with movement like going up or down the stairs, associated with shortness of breath at rest and on ambulation.  Patient was recently admitted for similar symptoms including tachypnea, and has been to an Urgent Care were she received azithromycin with no improvement.  Patient reports that she has been tested for COVID-19 prior to her hospitalization and she has been negative twice.  She endorses that her mother is COVID-19 positive and has been hospitalized.    Patient reports that she has not been consistent with her daily  hydroxychloroquine for the past month and a half because she was asymptomatic and thought she could stop the medication on her own without consulting her physician.  Patient finished a course of HCQ during her prior hospital stay.    ED: BIBA. /66, , RR 22, T 100.6F, SpO2 99% on 4LNC. Given tylenol 975mg, Doxycycline 100mg, Cefepime 2000mg, NS 1L. EKG sinus tachycardia. CT negative for PE, showing atelectasis/consolidation at bases & multifocal GGO RUL/RML/SAUNDRA. (07 Apr 2020 17:01)      PMH/PSH:  PAST MEDICAL & SURGICAL HISTORY:  Hypothyroidism  SLE (systemic lupus erythematosus)  No significant past surgical history    ALLERGIES:  Allergies    No Known Allergies    Intolerances      SOCIAL HABITS:  negative x3  FAMILY HISTORY:   FAMILY HISTORY:      REVIEW OF SYSTEM:  Elements of review of systems are negative or non-applicable except as noted above in HPI section.       HOME MEDICATIONS:  hydroxychloroquine 200 mg oral tablet  Synthroid 112 mcg (0.112 mg) oral tablet    MEDICATIONS:  MEDICATIONS  (STANDING):  cefepime   IVPB 2000 milliGRAM(s) IV Intermittent every 12 hours  doxycycline IVPB 100 milliGRAM(s) IV Intermittent every 12 hours  enoxaparin Injectable 30 milliGRAM(s) SubCutaneous every 12 hours  hydroxychloroquine 400 milliGRAM(s) Oral every 12 hours  levothyroxine 112 MICROGram(s) Oral daily  sodium chloride 0.9%. 1000 milliLiter(s) (75 mL/Hr) IV Continuous <Continuous>    MEDICATIONS  (PRN):  acetaminophen   Tablet .. 650 milliGRAM(s) Oral every 4 hours PRN Temp greater or equal to 38.5C (101.3F)  benzonatate 100 milliGRAM(s) Oral every 8 hours PRN Cough  ketorolac   Injectable 30 milliGRAM(s) IV Push every 6 hours PRN Moderate Pain (4 - 6)  morphine  - Injectable 2 milliGRAM(s) IV Push every 4 hours PRN Severe Pain (7 - 10)        VITALS:   Vital Signs Last 24 Hrs  T(C): 37.1 (09 Apr 2020 13:00), Max: 37.1 (08 Apr 2020 19:41)  T(F): 98.8 (09 Apr 2020 13:00), Max: 98.8 (09 Apr 2020 13:00)  HR: 82 (09 Apr 2020 13:00) (78 - 84)  BP: 140/84 (09 Apr 2020 13:00) (121/65 - 140/84)  BP(mean): --  RR: 18 (09 Apr 2020 13:00) (18 - 19)  SpO2: 95% (08 Apr 2020 19:41) (95% - 95%)        PHYSICAL EXAM:    GENERAL: NAD  HEAD:  Atraumatic, Normocephalic  NECK: Supple, No JVD  CHEST/LUNG: Crackles  HEART: Regular rate and rhythm; No murmurs  ABDOMEN: Soft, Nontender, Nondistended  EXTREMITIES:  Good peripheral Pulses, No clubbing, cyanosis, or edema      LABS:                        10.1   3.48  )-----------( 395      ( 09 Apr 2020 05:41 )             30.7     04-09    141  |  107  |  9<L>  ----------------------------<  81  4.6   |  22  |  0.5<L>    Ca    8.8      09 Apr 2020 05:41  Phos  2.6     04-08  Mg     1.9     04-09    TPro  6.0  /  Alb  3.3<L>  /  TBili  0.4  /  DBili  x   /  AST  15  /  ALT  9   /  AlkPhos  56  04-09    LIVER FUNCTIONS - ( 09 Apr 2020 05:41 )  Alb: 3.3 g/dL / Pro: 6.0 g/dL / ALK PHOS: 56 U/L / ALT: 9 U/L / AST: 15 U/L / GGT: x           CARDIAC MARKERS ( 08 Apr 2020 04:30 )  x     / x     / 38 U/L / x     / x            D-Dimer Assay, Quantitative: 247 ng/mL DDU (04-09-20 @ 05:41)  Ferritin, Serum: 614 ng/mL (04-08-20 @ 04:30)            ABG & VENT SETTINGS (when applicable)        DIAGNOSTIC STUDIES:  < from: CT Angio Chest w/ IV Cont (04.07.20 @ 14:14) >    EXAM:  CT ABDOMEN AND PELVIS IC        EXAM:  CT ANGIO CHEST (W)AW IC            PROCEDURE DATE:  04/07/2020            INTERPRETATION:  CLINICAL STATEMENT: Fever. 3 negative Covid tests. History of lupus.    TECHNIQUE: Multislice helical sections were obtained from the thoracic inlet to the lung bases during rapid administration of 100cc Optiray 320 intravenous contrast using a CTA protocol. Subsequently, axial CT sections were obtained from the domes of the diaphragms to the pubic symphysis. Thin sections were reconstructed through the pulmonary vasculature. MIP reformats were added.    COMPARISON CT: CT of the abdomen and pelvis on 6/3/2014.    OTHER STUDIES USED FOR CORRELATION: None.      FINDINGS:    CHEST:     PULMONARY EMBOLUS: No filling defects to suggest a pulmonary embolism    LUNGS/PLEURA/PERICARDIUM:: .Bandlike atelectasis/consolidation seen at the lung bases. Multifocal peripheral groundglass opacities in the right upper, right middle and left upper lobes as seen on coronal images. The central airways are patent. No pneumothorax.    MEDIASTINUM/THORACIC NODES: Unremarkable.      ABDOMEN/PELVIS:    HEPATOBILIARY: 0.8 cm right hepatic lobe hypodensity, incompletely evaluated.    SPLEEN: Unremarkable.    PANCREAS: Unremarkable.    ADRENAL GLANDS: 1.5 cm left adrenal well-circumscribed nodule, new since 6/3/2014.    KIDNEYS: Unremarkable.    ABDOMINOPELVIC NODES: Unremarkable.    PELVIC ORGANS: Unremarkable.    PERITONEUM/MESENTERY/BOWEL: Unremarkable. Normal appendix.    BONES/SOFT TISSUES: Unremarkable.          IMPRESSION:     No evidence of a pulmonary embolism.    Bandlike atelectasis/consolidation seen at the lung bases. Multifocal peripheral groundglass opacities in the right upper, right middle and left upper lobes as seen on coronal images. The findings could represent organizing pneumonia given the history of lupus and/or an atypical/viral pneumonia.    No pleural effusion. No adenopathy.    1.5 cm left adrenal well-circumscribed nodule, new since 6/3/2014. Recommend MRI of the abdomen with IV contrast.    0.8 cm right hepatic lobe hypodensity, incompletely evaluated. This can be assessed on the same MRI.    Chest findings discussed with Dr. Jiménez at 3:00 PM on 4/7/2020.    < end of copied text >

## 2020-04-09 NOTE — CONSULT NOTE ADULT - SUBJECTIVE AND OBJECTIVE BOX
Patient is a 59y old  Female who presents with a chief complaint of worsening cough and fever (09 Apr 2020 13:50)      HPI:  59 year old female with a PMH of SLE intermittently on HCQ, hypothyroidism, and recent hospital admission with negative COVID-19 PCR discharged 4/2/2020 who was re-admitted for worsening cough, chest tightness, and daily high grade fever. The patient reports several weeks of cough, chest tightness, high grade fevers, and myalgias with known exposure to covid pos son (who was tested but had been asymptomatic). Her mother was also covid pos, however she has not seen her mother in about a month. She had initially been treated by her PCP/urgent care with amoxicillin and azithromycin prior to her recent hospitalization without any improvement in symptoms. Patient reports that since her recent discharge she has been experiencing daily fevers with a Tmax of 103.7F usually at night, associated with chills, frontal headaches, generalized malaise and decreased PO intake.  Patient reports that she has been experiencing chest pain on inspiration described as pressure like, localized to her sternum radiating to her left posterior chest, exacerbated with movement like going up or down the stairs, associated with shortness of breath at rest and on ambulation. Since admission, the patient has been treated empirically with abx, HCQ was continued. She did have pulmonary imaging including CXR and CTA chest - notable for multifocal peripheral GGO RUL/RML/SAUNDRA suggestive of organizing pneumonia and/or atypical/viral pneumonia. Her labs showed elevated inflammatory markers, leukopenia, transient thrombocytosis, mild anemia, mildly elevated dsDNA, absence of hypocomplementemia. ID saw the patient and despite multiple negative covid tests feels her symptoms and presentation is consistent with covid-19.     Rheumatology was consulted due to concern for potential lupus contributions to her current presentation. The patient was diagnosed with SLE over a year ago by a local rheumatologist after presenting with symptoms of fatigue and arthralgias. Her serologies reportedly demonstrated pos JEN and elevated dsDNA. She has never demonstrated severe organ involvement. She notes that she has never had pulmonary involvement - her abnormal CXR finding over the past month was completely new. She has only been on HCQ - no other medications for SLE management. She infrequently was compliant with taking HCQ since overall she had been feeling better (she had some sort of "stem cell therapy" on [place] by a homeopathic provider which made her feel better overall resulting in decreased use of HCQ). She hasn't been seen by a rheumatologist in about 7 months, had wanted to reestablish with a new rheumatologist. Prior to her current illness she had not had associated recurrent fevers, chills, unintentional weight loss, drenching night sweats, oral/nasal ulcers, alopecia, pleurisy, serositis, gross hematuria, frothy urine, photosensitivity, rashes, joint swelling, joint stiffness. She does endorse sicca symptoms and has been overall unable to wear contacts more recently. She sometimes has hand arthralgias and shin cramping. She is UTD with mammograms, PAP smears, but not UTD with her colonoscopy. Her current symptoms do not feel similar to prior SLE-related symptoms.     ROS:  Negative except for pertinent positives cited above     PAST MEDICAL & SURGICAL HISTORY:  Hypothyroidism  SLE (systemic lupus erythematosus)  GERD  No significant past surgical history    SOCIAL HISTORY: Works as an , commutes using public transportation to [place]. Lives with her son who works in sanitation. No tobacco use. No other pertinent social history.     FAMILY HISTORY: No pertinent family history of known autoimmune disease except for mother with hx hypothyroidism as well.     MEDICATIONS  (STANDING):  cefepime   IVPB 2000 milliGRAM(s) IV Intermittent every 12 hours  doxycycline IVPB 100 milliGRAM(s) IV Intermittent every 12 hours  enoxaparin Injectable 30 milliGRAM(s) SubCutaneous every 12 hours  hydroxychloroquine 400 milliGRAM(s) Oral every 12 hours  levothyroxine 112 MICROGram(s) Oral daily  sodium chloride 0.9%. 1000 milliLiter(s) (75 mL/Hr) IV Continuous <Continuous>    MEDICATIONS  (PRN):  acetaminophen   Tablet .. 650 milliGRAM(s) Oral every 4 hours PRN Temp greater or equal to 38.5C (101.3F)  benzonatate 100 milliGRAM(s) Oral every 8 hours PRN Cough  ketorolac   Injectable 30 milliGRAM(s) IV Push every 6 hours PRN Moderate Pain (4 - 6)  morphine  - Injectable 2 milliGRAM(s) IV Push every 4 hours PRN Severe Pain (7 - 10)    Allergies  No Known Allergies  Intolerances    Vital Signs Last 24 Hrs  T(C): 37.1 (09 Apr 2020 13:00), Max: 37.1 (08 Apr 2020 19:41)  T(F): 98.8 (09 Apr 2020 13:00), Max: 98.8 (09 Apr 2020 13:00)  HR: 82 (09 Apr 2020 13:00) (78 - 84)  BP: 140/84 (09 Apr 2020 13:00) (121/65 - 140/84)  BP(mean): --  RR: 18 (09 Apr 2020 13:00) (18 - 19)  SpO2: 95% (08 Apr 2020 19:41) (95% - 95%)    PHYSICAL EXAM  General: adult in NAD resting supine in bed  HEENT: clear oropharynx without ulcerations or thrush, anicteric sclera  Neck: supple  CV: no peripheral edema, normal cap refill  Lungs: no conversational dyspnea  Abdomen: soft non-tender non-distended  Ext: no active synovitis or joint effusions. no joint tenderness or significant deformities. normal passive ROM bilateral wrists, elbows, shoulders, knees, ankles. endorses some myalgias with passive movement  Skin: no rashes and no petechiae  Neuro: normal muscle bulk/tone    LABS:                        10.1   3.48  )-----------( 395      ( 09 Apr 2020 05:41 )             30.7     Mean Cell Volume : 91.4 fL  Mean Cell Hemoglobin : 30.1 pg  Mean Cell Hemoglobin Concentration : 32.9 g/dL  Auto Neutrophil # : x  Auto Lymphocyte # : x  Auto Monocyte # : x  Auto Eosinophil # : x  Auto Basophil # : x  Auto Neutrophil % : x  Auto Lymphocyte % : x  Auto Monocyte % : x  Auto Eosinophil % : x  Auto Basophil % : x    Serial CBC's  04-09 @ 05:41  Hct-30.7 / Hgb-10.1 / Plat-395 / RBC-3.36 / WBC-3.48  Serial CBC's  04-08 @ 04:30  Hct-33.3 / Hgb-11.1 / Plat-427 / RBC-3.87 / WBC-17.35  Serial CBC's  04-07 @ 11:27  Hct-35.4 / Hgb-12.3 / Plat-382 / RBC-3.87 / WBC-8.94    04-09    141  |  107  |  9<L>  ----------------------------<  81  4.6   |  22  |  0.5<L>    Ca    8.8      09 Apr 2020 05:41  Phos  2.6     04-08  Mg     1.9     04-09    TPro  6.0  /  Alb  3.3<L>  /  TBili  0.4  /  DBili  x   /  AST  15  /  ALT  9   /  AlkPhos  56  04-09    Ferritin, Serum: 614 ng/mL (04-08 @ 04:30)  Ferritin, Serum: 389 ng/mL (04-07 @ 11:27)    RADIOLOGY & ADDITIONAL STUDIES:

## 2020-04-09 NOTE — PROGRESS NOTE ADULT - ASSESSMENT
59 year old female with a past medical history of SLE, Hypothyroidism, recent hospital admission with negative COVID-19 PCR discharged 4/2/2020, presenting for worsening cough and fever.    #Viral Pneumonia Vs. Bacterial Penumonia Vs. Lupus Pneumonitis secondary to Systemic Lupus Erytehmatosus  - ID recs appreciated   - lymphopenic   - Procal - .06 not suggestive of bacterial pneumonia   - CRP 3.9   - CTC negative for PE or pericardial effusion; showing atelectasis/consolidation at bases with multifocal ground-glass opacities in RUL/RML/SAUNDRA  - COVID- 19 PCR negative   - If COVID-19 PCR negative and no improvement in clinical status while on antibiotic therapy, consider Prednisone 1mg/kg/day for 3 days, and if stll no improvement consider Methylprednisolone 1g/day for 3 days. If no improvement on pulse glucocorticoids, consider Cyclophosphamide, Rituximab, IVIG.  - Tylenol 650mg q4h prn. Benzonatate 100mg q8h prn. Morphine 2mg q4h prn  - Continue Doxycycline 100mg q12h and Cefepime 2g q12h  - NS @ 75cc/hr  - ID consult for possible bacterial superinfection, f/u Procalcitonin  - Pulse ox at rest on RA & on O2 therapy every 4 hours; provider to RN placed with documentation of amount of O2 therapy  - If pulse ox at rest on RA &/or on O2 therapy > 92% for a continuous period of 6 hours, attempt pulse ox on ambulation on RA &/or on O2 therapy; provider to RN placed with documentation of amount of O2 therapy  - Incentive spirometer 10 times q1h  - Lower head of bed to improve perfusion in upper lobes due to predominant bilateral basal atelectasis  - Consider therapeutic anticoagulation if ferritin > 600, LDH > 250 in addition to d-dimer > 1mg/ml  - Repeat CRP, LDH & Ferritin in AM. Repeat Procalcitonin & d-dimer q48h.  - If SpO2 < 92% on 100% NRB for > 6 hours, try placing patient in a prone position, consider diagnosis of Cytokine Release Syndrome and possible Anti-IL1 therapy with stat Critical Care consult  - F/u TTE. F/u C3 C4 ESR to r/o possible Lupus pericarditis; low suspicion.  - Isolation precautions    Hypothyroidism; synthroid qd  Left adrenal well-circumscribed nodule 1.5cm, Right hepatic lobe 0.8cm hypodensity; outpatient MRI with IV contrast    DVT ppx; Lovenox 40 qd  GI ppx; no indication  Activity; ambulate as tolerated, fall precautions  Diet; regular, aspiration precautions 59 year old female with a past medical history of SLE, Hypothyroidism, recent hospital admission with negative COVID-19 PCR discharged 4/2/2020, presenting for worsening cough and fever.    #Viral Pneumonia Vs. Bacterial Penumonia Vs. Lupus Pneumonitis secondary to Systemic Lupus Erytehmatosus  - ID recs appreciated   - lymphopenic   - Procal - .06 not suggestive of bacterial pneumonia   - CRP 3.9   - CTC negative for PE or pericardial effusion; showing atelectasis/consolidation at bases with multifocal ground-glass opacities in RUL/RML/SAUNDRA  - COVID- 19 PCR negative   - If COVID-19 PCR negative and no improvement in clinical status while on antibiotic therapy, consider Prednisone 1mg/kg/day for 3 days, and if stll no improvement consider Methylprednisolone 1g/day for 3 days. If no improvement on pulse glucocorticoids, consider Cyclophosphamide, Rituximab, IVIG.  - Tylenol 650mg q4h prn. Benzonatate 100mg q8h prn. Morphine 2mg q4h prn  - Continue Doxycycline 100mg q12h and Cefepime 2g q12h  - NS @ 75cc/hr  - Lower head of bed to improve perfusion in upper lobes due to predominant bilateral basal atelectasis  - Consider therapeutic anticoagulation if ferritin > 600, LDH > 250 in addition to d-dimer > 1mg/ml  - Repeat CRP, LDH & Ferritin in AM. Repeat Procalcitonin & d-dimer q48h.  - If SpO2 < 92% on 100% NRB for > 6 hours, try placing patient in a prone position, consider diagnosis of Cytokine Release Syndrome and possible Anti-IL1 therapy with stat Critical Care consult  - F/u TTE. F/u C3 C4 ESR to r/o possible Lupus pericarditis; low suspicion.  - Isolation precautions    Hypothyroidism; synthroid qd  Left adrenal well-circumscribed nodule 1.5cm, Right hepatic lobe 0.8cm hypodensity; outpatient MRI with IV contrast    DVT ppx; Lovenox 40 qd  GI ppx; no indication  Activity; ambulate as tolerated, fall precautions  Diet; regular, aspiration precautions 59 year old female with a past medical history of SLE, Hypothyroidism, recent hospital admission with negative COVID-19 PCR discharged 4/2/2020, presenting for worsening cough and fever.    #Viral Pneumonia Vs. Bacterial Penumonia Vs. Lupus Pneumonitis secondary to Systemic Lupus Erytehmatosus  - lymphopenic   - Procal - .06 not suggestive of bacterial pneumonia   - CRP 3.9   - CTC negative for PE or pericardial effusion; showing atelectasis/consolidation at bases with multifocal ground-glass opacities in RUL/RML/SAUNDRA  - COVID- 19 PCR negative   - If COVID-19 PCR negative and no improvement in clinical status while on antibiotic therapy, consider Prednisone 1mg/kg/day for 3 days, and if stll no improvement consider Methylprednisolone 1g/day for 3 days. If no improvement on pulse glucocorticoids, consider Cyclophosphamide, Rituximab, IVIG.  - Tylenol 650mg q4h prn. Benzonatate 100mg q8h prn. Morphine 2mg q4h prn  - Continue Doxycycline 100mg q12h and Cefepime 2g q12h  - NS @ 75cc/hr  - Lower head of bed to improve perfusion in upper lobes due to predominant bilateral basal atelectasis  - D Dimer decreasing 1173-- 247  - F/u C3 C4 ESR   - EF 60-65%,   - Isolation precautions  - ID recs appreciated        - COVID repeatedly negative but clinical Hx, Symptoms, CT chest, lymphopenia all characteristic        -PLAQUENIL 400mg PO q12h x 2 doses, then 200mg BID PO x 4 days         -if QTc<500ms then start HCQ. No monitoring or serial EKGs required.        -if QTC>500ms then monitor EKG. Use MCOT. If tele/MCOT no serial EKGs        -d/c for QTc>550       Hypothyroidism; synthroid qd  Left adrenal well-circumscribed nodule 1.5cm, Right hepatic lobe 0.8cm hypodensity; outpatient MRI with IV contrast    DVT ppx; Lovenox 30 BID  GI ppx; no indication  Activity; ambulate as tolerated, fall precautions  Diet; regular, aspiration precautions 59 year old female with a past medical history of SLE, Hypothyroidism, recent hospital admission with negative COVID-19 PCR discharged 4/2/2020, presenting for worsening cough and fever.    #Viral Pneumonia Vs. Bacterial Penumonia Vs. Lupus Pneumonitis secondary to Systemic Lupus Erytehmatosus  - lymphopenic   - Procal - .06 not suggestive of bacterial pneumonia   - CRP 3.9   - CTC negative for PE or pericardial effusion; showing atelectasis/consolidation at bases with multifocal ground-glass opacities in RUL/RML/SAUNDRA  - COVID- 19 PCR negative   - If COVID-19 PCR negative and no improvement in clinical status while on antibiotic therapy, consider Prednisone 1mg/kg/day for 3 days, and if stll no improvement consider Methylprednisolone 1g/day for 3 days. If no improvement on pulse glucocorticoids, consider Cyclophosphamide, Rituximab, IVIG.  - Tylenol 650mg q4h prn. Benzonatate 100mg q8h prn. Morphine 2mg q4h prn  - Continue Doxycycline 100mg q12h and Cefepime 2g q12h  - NS @ 75cc/hr  - Lower head of bed to improve perfusion in upper lobes due to predominant bilateral basal atelectasis  - D Dimer decreasing 1173-- 247  - F/u C3 C4 ESR   - EF 60-65%,   - Isolation precautions  - ID recs appreciated        - COVID repeatedly negative but clinical Hx, Symptoms, CT chest, lymphopenia all characteristic        -PLAQUENIL 400mg PO q12h x 2 doses, then 200mg BID PO x 4 days         -if QTc<500ms then start HCQ. No monitoring or serial EKGs required.        -if QTC>500ms then monitor EKG. Use MCOT. If tele/MCOT no serial EKGs        -d/c for QTc>550   Attending note: I agree with ID will treat for COVID for now. Monitor for improvement, pt on RA    Hypothyroidism; synthroid qd  Left adrenal well-circumscribed nodule 1.5cm, Right hepatic lobe 0.8cm hypodensity; outpatient MRI with IV contrast    DVT ppx; Lovenox 30 BID  GI ppx; no indication  Activity; ambulate as tolerated, fall precautions  Diet; regular, aspiration precautions

## 2020-04-09 NOTE — CONSULT NOTE ADULT - CONSULT REASON
COVID-19
History of lupus now presenting with findings that could be concerning for pulmonary manifestations of lupus. Possible lupus flare - cryptogenic organizing PNA possibly 2/2 lupus. Advise in regards to steroid therapy. Pt covid negative x 2.
cough

## 2020-04-10 ENCOUNTER — TRANSCRIPTION ENCOUNTER (OUTPATIENT)
Age: 60
End: 2020-04-10

## 2020-04-10 VITALS
HEART RATE: 86 BPM | SYSTOLIC BLOOD PRESSURE: 152 MMHG | DIASTOLIC BLOOD PRESSURE: 67 MMHG | RESPIRATION RATE: 18 BRPM | TEMPERATURE: 99 F

## 2020-04-10 LAB
ALBUMIN SERPL ELPH-MCNC: 3.6 G/DL — SIGNIFICANT CHANGE UP (ref 3.5–5.2)
ALP SERPL-CCNC: 57 U/L — SIGNIFICANT CHANGE UP (ref 30–115)
ALT FLD-CCNC: 10 U/L — SIGNIFICANT CHANGE UP (ref 0–41)
ANION GAP SERPL CALC-SCNC: 12 MMOL/L — SIGNIFICANT CHANGE UP (ref 7–14)
AST SERPL-CCNC: 16 U/L — SIGNIFICANT CHANGE UP (ref 0–41)
BASOPHILS # BLD AUTO: 0.02 K/UL — SIGNIFICANT CHANGE UP (ref 0–0.2)
BASOPHILS NFR BLD AUTO: 0.5 % — SIGNIFICANT CHANGE UP (ref 0–1)
BILIRUB SERPL-MCNC: 0.4 MG/DL — SIGNIFICANT CHANGE UP (ref 0.2–1.2)
BUN SERPL-MCNC: 8 MG/DL — LOW (ref 10–20)
CALCIUM SERPL-MCNC: 9.1 MG/DL — SIGNIFICANT CHANGE UP (ref 8.5–10.1)
CHLORIDE SERPL-SCNC: 107 MMOL/L — SIGNIFICANT CHANGE UP (ref 98–110)
CO2 SERPL-SCNC: 23 MMOL/L — SIGNIFICANT CHANGE UP (ref 17–32)
CREAT SERPL-MCNC: 0.5 MG/DL — LOW (ref 0.7–1.5)
DSDNA AB SER-ACNC: 24 IU/ML — SIGNIFICANT CHANGE UP
EOSINOPHIL # BLD AUTO: 0.07 K/UL — SIGNIFICANT CHANGE UP (ref 0–0.7)
EOSINOPHIL NFR BLD AUTO: 1.8 % — SIGNIFICANT CHANGE UP (ref 0–8)
GLUCOSE SERPL-MCNC: 105 MG/DL — HIGH (ref 70–99)
HCT VFR BLD CALC: 30.6 % — LOW (ref 37–47)
HGB BLD-MCNC: 10.6 G/DL — LOW (ref 12–16)
IMM GRANULOCYTES NFR BLD AUTO: 1.6 % — HIGH (ref 0.1–0.3)
LYMPHOCYTES # BLD AUTO: 1.11 K/UL — LOW (ref 1.2–3.4)
LYMPHOCYTES # BLD AUTO: 28.9 % — SIGNIFICANT CHANGE UP (ref 20.5–51.1)
MAGNESIUM SERPL-MCNC: 1.9 MG/DL — SIGNIFICANT CHANGE UP (ref 1.8–2.4)
MCHC RBC-ENTMCNC: 31.3 PG — HIGH (ref 27–31)
MCHC RBC-ENTMCNC: 34.6 G/DL — SIGNIFICANT CHANGE UP (ref 32–37)
MCV RBC AUTO: 90.3 FL — SIGNIFICANT CHANGE UP (ref 81–99)
MONOCYTES # BLD AUTO: 0.43 K/UL — SIGNIFICANT CHANGE UP (ref 0.1–0.6)
MONOCYTES NFR BLD AUTO: 11.2 % — HIGH (ref 1.7–9.3)
NEUTROPHILS # BLD AUTO: 2.15 K/UL — SIGNIFICANT CHANGE UP (ref 1.4–6.5)
NEUTROPHILS NFR BLD AUTO: 56 % — SIGNIFICANT CHANGE UP (ref 42.2–75.2)
NRBC # BLD: 0 /100 WBCS — SIGNIFICANT CHANGE UP (ref 0–0)
PLATELET # BLD AUTO: 470 K/UL — HIGH (ref 130–400)
POTASSIUM SERPL-MCNC: 4.3 MMOL/L — SIGNIFICANT CHANGE UP (ref 3.5–5)
POTASSIUM SERPL-SCNC: 4.3 MMOL/L — SIGNIFICANT CHANGE UP (ref 3.5–5)
PROT SERPL-MCNC: 6.5 G/DL — SIGNIFICANT CHANGE UP (ref 6–8)
RBC # BLD: 3.39 M/UL — LOW (ref 4.2–5.4)
RBC # FLD: 11.3 % — LOW (ref 11.5–14.5)
SODIUM SERPL-SCNC: 142 MMOL/L — SIGNIFICANT CHANGE UP (ref 135–146)
WBC # BLD: 3.84 K/UL — LOW (ref 4.8–10.8)
WBC # FLD AUTO: 3.84 K/UL — LOW (ref 4.8–10.8)

## 2020-04-10 PROCEDURE — 99232 SBSQ HOSP IP/OBS MODERATE 35: CPT

## 2020-04-10 PROCEDURE — 70450 CT HEAD/BRAIN W/O DYE: CPT | Mod: 26

## 2020-04-10 RX ORDER — HYDROXYCHLOROQUINE SULFATE 200 MG
1 TABLET ORAL
Qty: 8 | Refills: 0
Start: 2020-04-10 | End: 2020-04-13

## 2020-04-10 RX ADMIN — Medication 110 MILLIGRAM(S): at 05:31

## 2020-04-10 RX ADMIN — Medication 112 MICROGRAM(S): at 05:31

## 2020-04-10 RX ADMIN — CEFEPIME 100 MILLIGRAM(S): 1 INJECTION, POWDER, FOR SOLUTION INTRAMUSCULAR; INTRAVENOUS at 05:31

## 2020-04-10 RX ADMIN — Medication 200 MILLIGRAM(S): at 17:25

## 2020-04-10 RX ADMIN — ENOXAPARIN SODIUM 30 MILLIGRAM(S): 100 INJECTION SUBCUTANEOUS at 05:31

## 2020-04-10 RX ADMIN — Medication 400 MILLIGRAM(S): at 05:31

## 2020-04-10 RX ADMIN — Medication 30 MILLIGRAM(S): at 11:28

## 2020-04-10 NOTE — PROGRESS NOTE ADULT - ASSESSMENT
59 year old female with a past medical history of SLE, Hypothyroidism, recent hospital admission with negative COVID-19 PCR discharged 4/2/2020, presenting for worsening cough and fever, today reports both have resolved, satting 9% on RA    #Viral Pneumonia Vs. Bacterial Penumonia Vs. Lupus Pneumonitis secondary to Systemic Lupus Erytehmatosus  - lymphopenic   - Procal - .06 not suggestive of bacterial pneumonia   - CRP 3.9   - CTC negative for PE or pericardial effusion; showing atelectasis/consolidation at bases with multifocal ground-glass opacities in RUL/RML/SAUNDRA, outpatient follow up recommended, follow up with Dr. Mary Omer, pulm for follow up imaging   - COVID- 19 PCR negative, clinically COVID19 is still suspected   - Tylenol 650mg q4h prn for HA, CT head is negative   - Continue Doxycycline 100mg q12h, Hydroxychloroquine, patient taking Hydroxychloroquine at baseline for lupus  - D Dimer decreasing 1173-- 247  - DS DNA is negative, doubt Lupus flare   - EF 60-65%,   - Isolation precautions    Hypothyroidism; synthroid qd  Left adrenal well-circumscribed nodule 1.5cm, Right hepatic lobe 0.8cm hypodensity; outpatient MRI with IV contrast, outpatient follow up for MRI dedicated for liver and adrenal imaging     DVT ppx; Lovenox 30 BID  GI ppx; no indication  Activity; ambulate as tolerated, fall precautions  Diet; regular, aspiration precautions  Case was discussed with house-staff

## 2020-04-10 NOTE — DISCHARGE NOTE PROVIDER - PROVIDER TOKENS
PROVIDER:[TOKEN:[[id number]],FOLLOWUP:[1 week]] PROVIDER:[TOKEN:[[id number]],FOLLOWUP:[1 week]],PROVIDER:[TOKEN:[[id number]],FOLLOWUP:[2 weeks]]

## 2020-04-10 NOTE — PROGRESS NOTE ADULT - ATTENDING COMMENTS
#Progress Note Handoff  Pending (specify):  supportive care  Family discussion: janet pt and agreed to plan. Pt moved to private room not in covid room.   Disposition: Home__x_/SNF___/Other________/Unknown at this time________
#Progress Note Handoff  Pending (specify):  supportive care  Family discussion: janet pt and agreed to plan. Pt moved to private room not in covid room.   Disposition: Home__x_/SNF___/Other________/Unknown at this time________

## 2020-04-10 NOTE — DISCHARGE NOTE NURSING/CASE MANAGEMENT/SOCIAL WORK - PATIENT PORTAL LINK FT
You can access the FollowMyHealth Patient Portal offered by [facility] by registering at the following website: [email or web address]. By joining [employer]’s FollowMyHealth portal, you will also be able to view your health information using other applications (apps) compatible with our system.

## 2020-04-10 NOTE — PROGRESS NOTE ADULT - SUBJECTIVE AND OBJECTIVE BOX
KEVIN SWIFT  59y, Female  Allergy: No Known Allergies      LAST 24-Hr EVENTS:    VITALS:  T(F): 98.6 (04-10-20 @ 14:10), Max: 99.1 (04-09-20 @ 20:31)  HR: 86 (04-10-20 @ 14:10)  BP: 152/67 (04-10-20 @ 14:10) (129/64 - 152/67)  RR: 18 (04-10-20 @ 14:10)  SpO2: 95% (04-10-20 @ 11:25)    PHYSICAL EXAM:    GENERAL: NAD  NECK: Supple, No JVD  CHEST/LUNG: CTA b/l  HEART: Regular rate and rhythm  ABDOMEN: Soft, Nontender, Nondistended  EXTREMITIES:  No clubbing, edema absent        TESTS & MEASUREMENTS:    IN: 0 mL / OUT: 1 mL / NET: -1 mL    IN: 1250 mL / OUT: 0 mL / NET: 1250 mL    IN: 120 mL / OUT: 200 mL / NET: -80 mL                            10.6   3.84  )-----------( 470      ( 10 Apr 2020 08:34 )             30.6       04-10    142  |  107  |  8<L>  ----------------------------<  105<H>  4.3   |  23  |  0.5<L>    Ca    9.1      10 Apr 2020 08:34  Mg     1.9     04-10    TPro  6.5  /  Alb  3.6  /  TBili  0.4  /  DBili  x   /  AST  16  /  ALT  10  /  AlkPhos  57  04-10    LIVER FUNCTIONS - ( 10 Apr 2020 08:34 )  Alb: 3.6 g/dL / Pro: 6.5 g/dL / ALK PHOS: 57 U/L / ALT: 10 U/L / AST: 16 U/L / GGT: x                   D-Dimer Assay, Quantitative: 247 ng/mL DDU (04-09-20 @ 05:41)  Ferritin, Serum: 614 ng/mL (04-08-20 @ 04:30)  D-Dimer Assay, Quantitative: 1173 ng/mL DDU (04-08-20 @ 04:30)  Fibrinogen Assay: 538.0 mg/dL (04-08-20 @ 04:30)  D-Dimer Assay, Quantitative: 401 ng/mL DDU (04-07-20 @ 21:35)  dimer        ABG & VENT SETTINGS: (when applicable)        RADIOLOGY & ADDITIONAL TESTS:      MEDICATIONS:  MEDICATIONS  (STANDING):  cefepime   IVPB 2000 milliGRAM(s) IV Intermittent every 12 hours  doxycycline IVPB 100 milliGRAM(s) IV Intermittent every 12 hours  enoxaparin Injectable 30 milliGRAM(s) SubCutaneous every 12 hours  hydroxychloroquine 200 milliGRAM(s) Oral every 12 hours  levothyroxine 112 MICROGram(s) Oral daily  sodium chloride 0.9%. 1000 milliLiter(s) (75 mL/Hr) IV Continuous <Continuous>    MEDICATIONS  (PRN):  acetaminophen   Tablet .. 650 milliGRAM(s) Oral every 4 hours PRN Temp greater or equal to 38.5C (101.3F)  benzonatate 100 milliGRAM(s) Oral every 8 hours PRN Cough  ketorolac   Injectable 30 milliGRAM(s) IV Push every 6 hours PRN Moderate Pain (4 - 6)

## 2020-04-10 NOTE — PROGRESS NOTE ADULT - SUBJECTIVE AND OBJECTIVE BOX
SUBJECTIVE:    Patient is a 59y old  Female who presents with a chief complaint of worsening cough and fever (08 Apr 2020 13:28)    Currently admitted to medicine with the primary diagnosis of Shortness of breath, this was resolved today      Today is hospital day 2d. Patient was seen and examined at bedside. This morning she is resting comfortably in bed and reports no new issues or overnight events. Endorsed improvement of dyspnea since admission  Today reports HA, no fever      PAST MEDICAL & SURGICAL HISTORY  PAST MEDICAL & SURGICAL HISTORY:  Hypothyroidism  SLE (systemic lupus erythematosus)  No significant past surgical history    SOCIAL HISTORY:    ALLERGIES:  No Known Allergies    MEDICATIONS:  MEDICATIONS  (STANDING):  cefepime   IVPB 2000 milliGRAM(s) IV Intermittent every 12 hours  doxycycline IVPB 100 milliGRAM(s) IV Intermittent every 12 hours  enoxaparin Injectable 30 milliGRAM(s) SubCutaneous every 12 hours  hydroxychloroquine 200 milliGRAM(s) Oral every 12 hours  levothyroxine 112 MICROGram(s) Oral daily  sodium chloride 0.9%. 1000 milliLiter(s) (75 mL/Hr) IV Continuous <Continuous>    MEDICATIONS  (PRN):  acetaminophen   Tablet .. 650 milliGRAM(s) Oral every 4 hours PRN Temp greater or equal to 38.5C (101.3F)  benzonatate 100 milliGRAM(s) Oral every 8 hours PRN Cough  ketorolac   Injectable 30 milliGRAM(s) IV Push every 6 hours PRN Moderate Pain (4 - 6)        VITALS:   T(F): 98  HR: 78  BP: 121/65  RR: 18  SpO2: 95%    LABS:                                 10.6   3.84  )-----------( 470      ( 10 Apr 2020 08:34 )             30.6   04-10    142  |  107  |  8<L>  ----------------------------<  105<H>  4.3   |  23  |  0.5<L>    Ca    9.1      10 Apr 2020 08:34  Mg     1.9     04-10    TPro  6.5  /  Alb  3.6  /  TBili  0.4  /  DBili  x   /  AST  16  /  ALT  10  /  AlkPhos  57  04-10      CARDIAC MARKERS ( 08 Apr 2020 04:30 )  x     / x     / 38 U/L / x     / x          RADIOLOGY:  < from: Xray Chest 1 View- PORTABLE-Routine (04.08.20 @ 07:45) >    IMPRESSION:     Unchanged bibasilar opacities.    < end of copied text >    PHYSICAL EXAM:  GENERAL: NAD, speaks in full sentences, no signs of respiratory distress  HEAD: Atraumatic  CHEST/LUNG: Clear to auscultation bilaterally  HEART: S1, S2; RRR; No murmurs, rubs, or gallops  ABDOMEN: BS+; Soft, Non-tender, Non-distended  EXTREMITIES:  2+ Peripheral Pulses  PSYCH: AAOx3

## 2020-04-10 NOTE — PROGRESS NOTE ADULT - REASON FOR ADMISSION
worsening cough and fever

## 2020-04-10 NOTE — DISCHARGE NOTE PROVIDER - NSDCCPCAREPLAN_GEN_ALL_CORE_FT
PRINCIPAL DISCHARGE DIAGNOSIS  Diagnosis: Shortness of breath  Assessment and Plan of Treatment: You presented to the hospital because of shortness of breath. You were admitted and tested for COVID. you were negative twice. You were managed by the medical team, pulmonary team and Rheumatology team. You were cinicallydoing well and due to the possibilty of a false negative results and the compatibility of you lab works with the COVID we are treating you emperically. You are medically stable so we are discharging you home. If you had any shortness of breath or experienced any emergencies please call 911 or present yourself to the nearest ED      SECONDARY DISCHARGE DIAGNOSES  Diagnosis: Hypoxia  Assessment and Plan of Treatment:     Diagnosis: Ground glass opacity present on imaging of lung  Assessment and Plan of Treatment:

## 2020-04-10 NOTE — DISCHARGE NOTE PROVIDER - HOSPITAL COURSE
59 year old female with a past medical history of SLE, Hypothyroidism, recent hospital admission with negative COVID-19 PCR discharged 4/2/2020, presenting for worsening cough and fever.        #Viral Pneumonia Vs. Bacterial Penumonia Vs. Lupus Pneumonitis secondary to Systemic Lupus Erytehmatosus    - lymphopenic     - Procal - .06 not suggestive of bacterial pneumonia     - CRP 3.9     - CTC negative for PE or pericardial effusion; showing atelectasis/consolidation at bases with multifocal ground-glass opacities in RUL/RML/SAUNDRA    - COVID- 19 PCR negative     - If COVID-19 PCR negative and no improvement in clinical status while on antibiotic therapy, consider Prednisone 1mg/kg/day for 3 days, and if stll no improvement consider Methylprednisolone 1g/day for 3 days. If no improvement on pulse glucocorticoids, consider Cyclophosphamide, Rituximab, IVIG.    - Tylenol 650mg q4h prn. Benzonatate 100mg q8h prn. Morphine 2mg q4h prn    - Continue Doxycycline 100mg q12h and Cefepime 2g q12h    - NS @ 75cc/hr    - Lower head of bed to improve perfusion in upper lobes due to predominant bilateral basal atelectasis    - D Dimer decreasing 1173-- 247    - F/u C3 C4 ESR     - EF 60-65%,     - ID recs appreciated          - COVID repeatedly negative but clinical Hx, Symptoms, CT chest, lymphopenia all characteristic          -PLAQUENIL 400mg PO q12h x 2 doses, then 200mg BID PO x 4 days                   DVT ppx; Lovenox 30 BID    GI ppx; no indication    Activity; ambulate as tolerated, fall precautions    Diet; regular, aspiration precautions

## 2020-04-10 NOTE — CDI QUERY NOTE - NSCDIOTHERTXTBX_GEN_ALL_CORE_HH
Patient admitted for worsening cough and fever. Currently being treated for COVID 19, despite negative covid results as clinical picture indicates covid. Patient endorses that her mother is COVID-19 positive and has been hospitalized.  ID consult-IMPRESSION;   COVID 19 repeatedly NG but the clinical Hx, symptoms CT chest , lymphopenia all characteristic of COVID-19   On admission- T-101.2, HR- 120, RR-22    Please clarify based on above clinical indicators-  -Sepsis, POA  -Other, please specify  -Unable to determine

## 2020-04-10 NOTE — DISCHARGE NOTE PROVIDER - NSDCMRMEDTOKEN_GEN_ALL_CORE_FT
hydroxychloroquine 200 mg oral tablet: 1 tab(s) orally once a day  hydroxychloroquine 200 mg oral tablet: 1 tab(s) orally 2 times a day  for 4 more days and then g back to your usual dose   Synthroid 112 mcg (0.112 mg) oral tablet: 1 tab(s) orally once a day

## 2020-04-10 NOTE — DISCHARGE NOTE PROVIDER - CARE PROVIDERS DIRECT ADDRESSES
,DirectAddress_Unknown ,DirectAddress_Unknown,[email or web address]

## 2020-04-10 NOTE — DISCHARGE NOTE PROVIDER - CARE PROVIDER_API CALL
Abimael Esposito (DO)  Infectious Disease; Internal Medicine  [address] [place] [address]  Phone: [phone]  Fax: [phone]  Follow Up Time: 1 week Abimael Esposito (DO)  Infectious Disease; Internal Medicine  [address] [place] [id number]  Phone: [phone]  Fax: [phone]  Follow Up Time: 1 week    Jamila Vizcaino (DO)  Internal Medicine  [address] [place] [address], [facility] [address]  [place] [address]  Phone: [phone]  Follow Up Time: 2 weeks

## 2020-04-13 DIAGNOSIS — M32.9 SYSTEMIC LUPUS ERYTHEMATOSUS, UNSPECIFIED: ICD-10-CM

## 2020-04-13 DIAGNOSIS — J18.9 PNEUMONIA, UNSPECIFIED ORGANISM: ICD-10-CM

## 2020-04-13 DIAGNOSIS — J15.9 UNSPECIFIED BACTERIAL PNEUMONIA: ICD-10-CM

## 2020-04-13 DIAGNOSIS — J12.9 VIRAL PNEUMONIA, UNSPECIFIED: ICD-10-CM

## 2020-04-13 DIAGNOSIS — E03.9 HYPOTHYROIDISM, UNSPECIFIED: ICD-10-CM

## 2020-04-13 DIAGNOSIS — Z20.828 CONTACT WITH AND (SUSPECTED) EXPOSURE TO OTHER VIRAL COMMUNICABLE DISEASES: ICD-10-CM

## 2021-02-25 PROBLEM — E03.9 HYPOTHYROIDISM, UNSPECIFIED: Chronic | Status: ACTIVE | Noted: 2020-04-07

## 2021-03-04 ENCOUNTER — APPOINTMENT (OUTPATIENT)
Dept: NEUROLOGY | Facility: CLINIC | Age: 61
End: 2021-03-04
Payer: COMMERCIAL

## 2021-03-04 VITALS
WEIGHT: 140 LBS | DIASTOLIC BLOOD PRESSURE: 74 MMHG | HEIGHT: 62 IN | HEART RATE: 130 BPM | OXYGEN SATURATION: 98 % | BODY MASS INDEX: 25.76 KG/M2 | SYSTOLIC BLOOD PRESSURE: 111 MMHG

## 2021-03-04 DIAGNOSIS — Z87.09 PERSONAL HISTORY OF OTHER DISEASES OF THE RESPIRATORY SYSTEM: ICD-10-CM

## 2021-03-04 DIAGNOSIS — Z82.0 FAMILY HISTORY OF EPILEPSY AND OTHER DISEASES OF THE NERVOUS SYSTEM: ICD-10-CM

## 2021-03-04 DIAGNOSIS — Z87.39 PERSONAL HISTORY OF OTHER DISEASES OF THE MUSCULOSKELETAL SYSTEM AND CONNECTIVE TISSUE: ICD-10-CM

## 2021-03-04 DIAGNOSIS — U07.1 COVID-19: ICD-10-CM

## 2021-03-04 DIAGNOSIS — Z86.39 PERSONAL HISTORY OF OTHER ENDOCRINE, NUTRITIONAL AND METABOLIC DISEASE: ICD-10-CM

## 2021-03-04 PROCEDURE — 99072 ADDL SUPL MATRL&STAF TM PHE: CPT

## 2021-03-04 PROCEDURE — 99203 OFFICE O/P NEW LOW 30 MIN: CPT

## 2021-03-04 RX ORDER — LEVOTHYROXINE SODIUM 100 UG/1
100 TABLET ORAL DAILY
Refills: 0 | Status: ACTIVE | COMMUNITY

## 2021-03-04 RX ORDER — PROPRANOLOL HYDROCHLORIDE 20 MG/1
20 TABLET ORAL
Qty: 60 | Refills: 3 | Status: ACTIVE | COMMUNITY
Start: 2021-03-04 | End: 1900-01-01

## 2021-03-04 RX ORDER — AZELASTINE HYDROCHLORIDE 137 UG/1
0.1 SPRAY, METERED NASAL TWICE DAILY
Refills: 0 | Status: ACTIVE | COMMUNITY

## 2021-03-04 RX ORDER — MONTELUKAST SODIUM 10 MG/1
10 TABLET, FILM COATED ORAL DAILY
Refills: 0 | Status: ACTIVE | COMMUNITY

## 2021-03-04 RX ORDER — DULOXETINE HYDROCHLORIDE 60 MG/1
60 CAPSULE, DELAYED RELEASE PELLETS ORAL TWICE DAILY
Refills: 0 | Status: ACTIVE | COMMUNITY

## 2021-03-04 RX ORDER — BUDESONIDE AND FORMOTEROL FUMARATE DIHYDRATE 80; 4.5 UG/1; UG/1
80-4.5 AEROSOL RESPIRATORY (INHALATION) TWICE DAILY
Refills: 0 | Status: ACTIVE | COMMUNITY

## 2021-03-04 NOTE — HISTORY OF PRESENT ILLNESS
[FreeTextEntry1] : Ms. Mckenzie is a 1yo woman with PMHX below here for evaluation of word fining issues, cognitive slowing and tremors which started a few months after she had developed COVID19 (in March 2020).  She began noticing over the last few months difficulty finding words and some difficulty performing tasks.  She then also began noticing a mild tremor initially after covid infection which over last few months has become worse and can interfere with her activities including eating.  She was told it was likely anxiety but she denies anxiety and only recently has started to become depressed from all these issues.  She was working as an  however because of how much more difficult it has become to perform her normal job duties she has taken some time off to figure out the problem.  She was told by one doctor she might have lupus however saw 2 other rheumatologists who donot believe she has lupus and think it is fibromyalgia and started her on cymbalta.  She was taking 60mg BID but it was decreased yesterday because her tremors became much worse.  She has no family history of dementia but her brother also has tremors.

## 2021-03-04 NOTE — DISCUSSION/SUMMARY
[FreeTextEntry1] : Ms. Mckenzie is a 61yo with history of COVID in March 2020 with progressive cognitive problems and word finding issues as well as new tremor.  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  Cognitive issues have been seen in a number of patients post COVID however unclear etiology or management strategies if related.  She has been worked up for autoimmune issues and still undergoing diagnosis and management\par 1. Send serology\par 2. Would switch metoprolol to propranolol 20mg BID if agreeable with her cardiologist\par 3. f/u after labs and in atleast 1-2 months on medication\par 4. Call to give an update in 2-3 weeks\par

## 2021-03-04 NOTE — REVIEW OF SYSTEMS
[Feeling Poorly] : feeling poorly [As Noted in HPI] : as noted in HPI [Depression] : depression [Heart Rate Is Fast] : fast heart rate [Shortness Of Breath] : shortness of breath [Arthralgias] : arthralgias [Joint Pain] : joint pain [Negative] : Heme/Lymph [FreeTextEntry5] : placed on metoprolol for palpitations

## 2021-03-04 NOTE — PHYSICAL EXAM
[FreeTextEntry1] : MS: Awake, alert, oriented to person, place, situation and time.  Follows commands. \par \par Language: Speech is clear, fluent. No dysarthria. \par \par CNs 2 - 12 intact. EOMI no nystagmus, no diplopia. No facial asymmetry b/l. Tongue midline, normal movements, no atrophy.\par \par Motor: Normal muscle bulk & tone. very significant tremor in both hands and with speech. No pronator drift. Muscle strength of b/l UE and b/l LE 5/5. There is some give way associated with tremors\par \par Sensation: Intact to LT, temp, vib b/l throughout\par \par Cortical: No extinction\par \par Coordination: Intact rapid-alternating movements. No dysmetria to FTN. \par \par DTR: 2+ in biceps, brachioradialis and triceps; 1+ in patellar and ankle; plantars are down b/l\par \par Gait: No postural instability. Normal stance and tandem gait.\par \par General: Alert and oriented to person, place, time, and situation. Appears to be in emotional distress. Cooperative. Follows commands. \par Eyes: Sclera and conjunctiva were normal and pupils were equal in size, round, reactive to light, with normal accommodation\par ENT: Ears and nose normal in appearance. \par Vascular: No peripheral edema.\par Respiratory: No visible respiratory distress. \par Musculoskeletal: No involuntary movements were seen.\par Skin: Normal skin color and pigmentation.\par

## 2021-03-04 NOTE — DATA REVIEWED
[de-identified] : reviewed reports of MRI brain which showed some small white matter disease nonspecific\par

## 2021-06-28 ENCOUNTER — APPOINTMENT (OUTPATIENT)
Dept: NEUROLOGY | Facility: CLINIC | Age: 61
End: 2021-06-28
Payer: COMMERCIAL

## 2021-06-28 VITALS
BODY MASS INDEX: 27.42 KG/M2 | HEIGHT: 62 IN | HEART RATE: 93 BPM | TEMPERATURE: 97 F | DIASTOLIC BLOOD PRESSURE: 74 MMHG | SYSTOLIC BLOOD PRESSURE: 137 MMHG | OXYGEN SATURATION: 99 % | WEIGHT: 149 LBS

## 2021-06-28 PROCEDURE — 99072 ADDL SUPL MATRL&STAF TM PHE: CPT

## 2021-06-28 PROCEDURE — 99213 OFFICE O/P EST LOW 20 MIN: CPT

## 2021-06-28 RX ORDER — LEVALBUTEROL TARTRATE 45 UG/1
45 AEROSOL, METERED ORAL
Qty: 15 | Refills: 0 | Status: ACTIVE | COMMUNITY
Start: 2021-01-28

## 2021-06-28 RX ORDER — DIPHENHYDRAMINE HCL 50 MG/1
50 CAPSULE ORAL
Qty: 1 | Refills: 0 | Status: ACTIVE | COMMUNITY
Start: 2021-04-21

## 2021-06-28 RX ORDER — ONDANSETRON 4 MG/1
4 TABLET ORAL
Qty: 2 | Refills: 0 | Status: ACTIVE | COMMUNITY
Start: 2021-06-17

## 2021-06-28 RX ORDER — LEVALBUTEROL HYDROCHLORIDE 1.25 MG/3ML
1.25 SOLUTION RESPIRATORY (INHALATION)
Qty: 270 | Refills: 0 | Status: ACTIVE | COMMUNITY
Start: 2021-02-03

## 2021-06-28 RX ORDER — IVABRADINE 5 MG/1
5 TABLET, FILM COATED ORAL
Qty: 168 | Refills: 0 | Status: ACTIVE | COMMUNITY
Start: 2021-06-06

## 2021-06-28 RX ORDER — PANTOPRAZOLE 40 MG/1
40 TABLET, DELAYED RELEASE ORAL
Qty: 30 | Refills: 0 | Status: ACTIVE | COMMUNITY
Start: 2020-09-03

## 2021-06-28 RX ORDER — LEVOCETIRIZINE DIHYDROCHLORIDE 5 MG/1
5 TABLET ORAL
Qty: 30 | Refills: 0 | Status: ACTIVE | COMMUNITY
Start: 2021-04-13

## 2021-06-28 RX ORDER — BUDESONIDE AND FORMOTEROL FUMARATE DIHYDRATE 160; 4.5 UG/1; UG/1
160-4.5 AEROSOL RESPIRATORY (INHALATION)
Qty: 10 | Refills: 0 | Status: ACTIVE | COMMUNITY
Start: 2021-05-19

## 2021-06-28 RX ORDER — NEBULIZER AND COMPRESSOR
EACH MISCELLANEOUS
Qty: 1 | Refills: 0 | Status: ACTIVE | COMMUNITY
Start: 2021-02-03

## 2021-06-28 NOTE — DISCUSSION/SUMMARY
[FreeTextEntry1] : Ms. Mckenzie is a 62yo with history of COVID in March 2020 with progressive cognitive problems and word finding issues as well as new tremor.  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  Cognitive issues have been seen in a number of patients post COVID however unclear etiology or management strategies if related.  She has not had any improvement over the last few months so at this point would send her for neuropsychological testing.\par 1. Neuropsych testing\par 2. f/u after neuropsych\par \par

## 2021-06-28 NOTE — PHYSICAL EXAM
[FreeTextEntry1] : MS: Awake, alert, oriented to person, place, situation and time.  Follows commands. \par \par Language: Speech is clear, some breaks in her speech, No dysarthria. \par \par CNs 2 - 12 intact. EOMI no nystagmus, no diplopia. No facial asymmetry b/l. Tongue midline, normal movements, no atrophy.\par \par Motor: Normal muscle bulk & tone. very significant tremor in both hands and with speech. No pronator drift. Muscle strength of b/l UE and b/l LE 5/5. There is some give way associated with tremors\par \par Sensation: Intact to LT, temp, vib b/l throughout\par \par Cortical: No extinction\par \par Coordination: Intact rapid-alternating movements. No dysmetria to FTN. \par \par DTR: 2+ in biceps, brachioradialis and triceps; 1+ in patellar and ankle; plantars are down b/l\par \par Gait: No postural instability. Normal stance and tandem gait.\par \par General: Alert and oriented to person, place, time, and situation. Appears to be in emotional distress. Cooperative. Follows commands. \par Eyes: Sclera and conjunctiva were normal and pupils were equal in size, round, reactive to light, with normal accommodation\par ENT: Ears and nose normal in appearance. \par Vascular: No peripheral edema.\par Respiratory: No visible respiratory distress. \par Musculoskeletal: No involuntary movements were seen.\par Skin: Normal skin color and pigmentation.\par

## 2021-06-28 NOTE — REVIEW OF SYSTEMS
[Feeling Poorly] : feeling poorly [As Noted in HPI] : as noted in HPI [Anxiety] : anxiety [Depression] : depression [Heart Rate Is Fast] : fast heart rate [Shortness Of Breath] : shortness of breath [Arthralgias] : arthralgias [Joint Pain] : joint pain [Negative] : Heme/Lymph [FreeTextEntry5] : placed on metoprolol for palpitations

## 2021-06-28 NOTE — HISTORY OF PRESENT ILLNESS
[FreeTextEntry1] : Ms. Mckenzie is a 60yo woman with PMHX below here for evaluation of word finding issues, cognitive slowing and tremors which started a few months after she had developed COVID19 (in March 2020).  She began noticing over the last few months difficulty finding words and some difficulty performing tasks.  She then also began noticing a mild tremor initially after covid infection which over last few months has become worse and can interfere with her activities including eating.  She was told it was likely anxiety but she denies anxiety and only recently has started to become depressed from all these issues.  She was working as an  however because of how much more difficult it has become to perform her normal job duties she has taken some time off to figure out the problem.  She was told by one doctor she might have lupus however saw 2 other rheumatologists who donot believe she has lupus and think it is fibromyalgia and started her on cymbalta.  She was taking 60mg BID but it was decreased yesterday because her tremors became much worse.  She has no family history of dementia but her brother also has tremors.\par \par Since her last visit in 3/4/2021 she had an MRI brain which was essssentially unremarkable and bloodwork only significant for DNA ab and ightly low TSH.  She is following with her endocrinologist as says her thyroid is better.\par Her cognitive and word finsing issues have not improved much and still present.  She says some days she does better and somedays she is worse

## 2021-06-28 NOTE — DATA REVIEWED
[de-identified] : reviewed reports of MRI brain which showed some small white matter disease nonspecific\par

## 2021-11-01 ENCOUNTER — APPOINTMENT (OUTPATIENT)
Dept: NEUROPSYCHOLOGY | Facility: CLINIC | Age: 61
End: 2021-11-01

## 2021-11-17 ENCOUNTER — APPOINTMENT (OUTPATIENT)
Dept: NEUROPSYCHOLOGY | Facility: CLINIC | Age: 61
End: 2021-11-17

## 2021-11-22 ENCOUNTER — APPOINTMENT (OUTPATIENT)
Dept: NEUROPSYCHOLOGY | Facility: CLINIC | Age: 61
End: 2021-11-22

## 2022-01-01 NOTE — ED PROVIDER NOTE - TOBACCO USE
Never smoker
* use Clarisa bulb syringe for nasal congestion.  * Follow-up with pediatrician within 3 days for re-evaluation.   * IF NEEDED, CALL [phone] TO FIND A PRIMARY CARE PHYSICIAN.  OR CALL [phone] TO MAKE AN APPOINTMENT WITH THE MEDICAL CLINIC.  *  RETURN TO THE ER FOR ANY WORSENING SYMPTOMS.      //////////////////////////////////////////////////      * use la jeringa de bulbo Clarisa para la congestión nasal.  * Seguimiento con pediatra dentro de los 3 días para reevaluación.  * SI ES NECESARIO, LLAME AL [phone] PARA ENCONTRAR UN MÉDICO DE ATENCIÓN PRIMARIA. O LLAME [phone] PARA HACER MALENA KASANDRA CON LA CLÍNICA MÉDICA.  * REGRESE A LA URGENCIA POR CUALQUIER SÍNTOMA QUE EMPEORE.

## 2022-01-07 ENCOUNTER — APPOINTMENT (OUTPATIENT)
Dept: NEUROLOGY | Facility: CLINIC | Age: 62
End: 2022-01-07
Payer: MEDICAID

## 2022-01-07 DIAGNOSIS — F09 UNSPECIFIED MENTAL DISORDER DUE TO KNOWN PHYSIOLOGICAL CONDITION: ICD-10-CM

## 2022-01-07 DIAGNOSIS — R25.1 TREMOR, UNSPECIFIED: ICD-10-CM

## 2022-01-07 PROCEDURE — 99213 OFFICE O/P EST LOW 20 MIN: CPT | Mod: 95

## 2022-01-07 RX ORDER — DULOXETINE HYDROCHLORIDE 30 MG/1
30 CAPSULE, DELAYED RELEASE PELLETS ORAL
Qty: 180 | Refills: 0 | Status: DISCONTINUED | COMMUNITY
Start: 2021-05-22 | End: 2022-01-07

## 2022-01-07 RX ORDER — PRIMIDONE 50 MG/1
50 TABLET ORAL
Qty: 30 | Refills: 5 | Status: ACTIVE | COMMUNITY
Start: 2021-02-02 | End: 1900-01-01

## 2022-01-07 RX ORDER — METOPROLOL TARTRATE 25 MG/1
25 TABLET, FILM COATED ORAL DAILY
Refills: 0 | Status: DISCONTINUED | COMMUNITY
End: 2022-01-07

## 2022-01-07 RX ORDER — METOPROLOL SUCCINATE 25 MG/1
25 TABLET, EXTENDED RELEASE ORAL
Qty: 30 | Refills: 0 | Status: DISCONTINUED | COMMUNITY
Start: 2021-01-20 | End: 2022-01-07

## 2022-01-07 RX ORDER — COLCHICINE 0.6 MG/1
0.6 CAPSULE ORAL
Qty: 30 | Refills: 0 | Status: DISCONTINUED | COMMUNITY
Start: 2021-03-05 | End: 2022-01-07

## 2022-01-07 RX ORDER — PANTOPRAZOLE SODIUM 40 MG/1
40 GRANULE, DELAYED RELEASE ORAL
Refills: 0 | Status: DISCONTINUED | COMMUNITY
End: 2022-01-07

## 2022-01-07 RX ORDER — PREDNISONE 20 MG/1
20 TABLET ORAL
Qty: 42 | Refills: 0 | Status: DISCONTINUED | COMMUNITY
Start: 2021-02-03 | End: 2022-01-07

## 2022-01-07 RX ORDER — PREDNISONE 50 MG/1
50 TABLET ORAL
Qty: 3 | Refills: 0 | Status: DISCONTINUED | COMMUNITY
Start: 2021-04-21 | End: 2022-01-07

## 2022-01-07 RX ORDER — CEFDINIR 300 MG/1
300 CAPSULE ORAL
Qty: 14 | Refills: 0 | Status: DISCONTINUED | COMMUNITY
Start: 2021-03-05 | End: 2022-01-07

## 2022-01-07 NOTE — HISTORY OF PRESENT ILLNESS
[FreeTextEntry1] : Ms. Mckenzie is a 60yo woman with PMHX below here for evaluation of word finding issues, cognitive slowing and tremors which started a few months after she had developed COVID19 (in March 2020).  She began noticing over the last few months difficulty finding words and some difficulty performing tasks.  She then also began noticing a mild tremor initially after covid infection which over last few months has become worse and can interfere with her activities including eating.  She was told it was likely anxiety but she denies anxiety and only recently has started to become depressed from all these issues.  She was working as an  however because of how much more difficult it has become to perform her normal job duties she has taken some time off to figure out the problem.  She was told by one doctor she might have lupus however saw 2 other rheumatologists who donot believe she has lupus and think it is fibromyalgia and started her on cymbalta.  She was taking 60mg BID but it was decreased yesterday because her tremors became much worse.  She has no family history of dementia but her brother also has tremors.\par \par Since her last visit in 6/28/2021 she had an MRI brain which was essentially unremarkable and bloodwork only significant for DNA ab and slightly low TSH.  She is following with her endocrinologist as says her thyroid is better.\par Her cognitive and word finding issues have not improved much and still present.  She says some days she does better and somedays she is worse

## 2022-01-07 NOTE — DATA REVIEWED
[de-identified] : reviewed reports of MRI brain which showed some small white matter disease nonspecific\par

## 2022-01-07 NOTE — DISCUSSION/SUMMARY
[FreeTextEntry1] : Ms. Mckenzie is a 60yo with history of COVID in March 2020 with progressive cognitive problems and word finding issues as well as new tremor.  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  Cognitive issues have been seen in a number of patients post COVID however unclear etiology or management strategies if related.  She has not had any improvement over the last few months so at this point would send her for neuropsychological testing.\par 1. Complete neuropsych testing\par 2. send autoimmune labs\par 3. Start primidone 50mg QHS\par 4. f/u in 4 months\par \par

## 2022-01-07 NOTE — REASON FOR VISIT
[Home] : at home, [unfilled] , at the time of the visit. [Medical Office: ([facility])___] : at the medical office located in  [Verbal consent obtained from patient] : the patient, [unfilled]

## 2022-01-11 ENCOUNTER — TRANSCRIPTION ENCOUNTER (OUTPATIENT)
Age: 62
End: 2022-01-11

## 2022-02-16 ENCOUNTER — APPOINTMENT (OUTPATIENT)
Dept: NEUROPSYCHOLOGY | Facility: CLINIC | Age: 62
End: 2022-02-16

## 2022-02-20 ENCOUNTER — INPATIENT (INPATIENT)
Facility: HOSPITAL | Age: 62
LOS: 1 days | Discharge: HOME | End: 2022-02-22
Attending: INTERNAL MEDICINE | Admitting: INTERNAL MEDICINE
Payer: MEDICAID

## 2022-02-20 VITALS
TEMPERATURE: 97 F | OXYGEN SATURATION: 96 % | WEIGHT: 149.47 LBS | DIASTOLIC BLOOD PRESSURE: 79 MMHG | SYSTOLIC BLOOD PRESSURE: 147 MMHG | HEART RATE: 110 BPM | RESPIRATION RATE: 20 BRPM

## 2022-02-20 LAB
ALBUMIN SERPL ELPH-MCNC: 4.6 G/DL — SIGNIFICANT CHANGE UP (ref 3.5–5.2)
ALP SERPL-CCNC: 125 U/L — HIGH (ref 30–115)
ALT FLD-CCNC: 40 U/L — SIGNIFICANT CHANGE UP (ref 0–41)
ANION GAP SERPL CALC-SCNC: 13 MMOL/L — SIGNIFICANT CHANGE UP (ref 7–14)
APTT BLD: 35.8 SEC — SIGNIFICANT CHANGE UP (ref 27–39.2)
AST SERPL-CCNC: 35 U/L — SIGNIFICANT CHANGE UP (ref 0–41)
BASOPHILS # BLD AUTO: 0.06 K/UL — SIGNIFICANT CHANGE UP (ref 0–0.2)
BASOPHILS NFR BLD AUTO: 0.8 % — SIGNIFICANT CHANGE UP (ref 0–1)
BILIRUB SERPL-MCNC: 0.4 MG/DL — SIGNIFICANT CHANGE UP (ref 0.2–1.2)
BLD GP AB SCN SERPL QL: SIGNIFICANT CHANGE UP
BUN SERPL-MCNC: 15 MG/DL — SIGNIFICANT CHANGE UP (ref 10–20)
CALCIUM SERPL-MCNC: 9.5 MG/DL — SIGNIFICANT CHANGE UP (ref 8.5–10.1)
CHLORIDE SERPL-SCNC: 109 MMOL/L — SIGNIFICANT CHANGE UP (ref 98–110)
CO2 SERPL-SCNC: 23 MMOL/L — SIGNIFICANT CHANGE UP (ref 17–32)
CREAT SERPL-MCNC: 0.8 MG/DL — SIGNIFICANT CHANGE UP (ref 0.7–1.5)
EOSINOPHIL # BLD AUTO: 0.21 K/UL — SIGNIFICANT CHANGE UP (ref 0–0.7)
EOSINOPHIL NFR BLD AUTO: 2.6 % — SIGNIFICANT CHANGE UP (ref 0–8)
GLUCOSE SERPL-MCNC: 140 MG/DL — HIGH (ref 70–99)
HCT VFR BLD CALC: 37.7 % — SIGNIFICANT CHANGE UP (ref 37–47)
HGB BLD-MCNC: 12.4 G/DL — SIGNIFICANT CHANGE UP (ref 12–16)
IMM GRANULOCYTES NFR BLD AUTO: 0.1 % — SIGNIFICANT CHANGE UP (ref 0.1–0.3)
INR BLD: 0.98 RATIO — SIGNIFICANT CHANGE UP (ref 0.65–1.3)
LYMPHOCYTES # BLD AUTO: 2.73 K/UL — SIGNIFICANT CHANGE UP (ref 1.2–3.4)
LYMPHOCYTES # BLD AUTO: 34.4 % — SIGNIFICANT CHANGE UP (ref 20.5–51.1)
MCHC RBC-ENTMCNC: 31.4 PG — HIGH (ref 27–31)
MCHC RBC-ENTMCNC: 32.9 G/DL — SIGNIFICANT CHANGE UP (ref 32–37)
MCV RBC AUTO: 95.4 FL — SIGNIFICANT CHANGE UP (ref 81–99)
MONOCYTES # BLD AUTO: 0.79 K/UL — HIGH (ref 0.1–0.6)
MONOCYTES NFR BLD AUTO: 10 % — HIGH (ref 1.7–9.3)
NEUTROPHILS # BLD AUTO: 4.13 K/UL — SIGNIFICANT CHANGE UP (ref 1.4–6.5)
NEUTROPHILS NFR BLD AUTO: 52.1 % — SIGNIFICANT CHANGE UP (ref 42.2–75.2)
NRBC # BLD: 0 /100 WBCS — SIGNIFICANT CHANGE UP (ref 0–0)
PLATELET # BLD AUTO: 350 K/UL — SIGNIFICANT CHANGE UP (ref 130–400)
POTASSIUM SERPL-MCNC: 4.5 MMOL/L — SIGNIFICANT CHANGE UP (ref 3.5–5)
POTASSIUM SERPL-SCNC: 4.5 MMOL/L — SIGNIFICANT CHANGE UP (ref 3.5–5)
PROT SERPL-MCNC: 7.3 G/DL — SIGNIFICANT CHANGE UP (ref 6–8)
PROTHROM AB SERPL-ACNC: 11.3 SEC — SIGNIFICANT CHANGE UP (ref 9.95–12.87)
RBC # BLD: 3.95 M/UL — LOW (ref 4.2–5.4)
RBC # FLD: 11.9 % — SIGNIFICANT CHANGE UP (ref 11.5–14.5)
SARS-COV-2 RNA SPEC QL NAA+PROBE: SIGNIFICANT CHANGE UP
SODIUM SERPL-SCNC: 145 MMOL/L — SIGNIFICANT CHANGE UP (ref 135–146)
TROPONIN T SERPL-MCNC: <0.01 NG/ML — SIGNIFICANT CHANGE UP
WBC # BLD: 7.93 K/UL — SIGNIFICANT CHANGE UP (ref 4.8–10.8)
WBC # FLD AUTO: 7.93 K/UL — SIGNIFICANT CHANGE UP (ref 4.8–10.8)

## 2022-02-20 PROCEDURE — G0425: CPT | Mod: GT

## 2022-02-20 PROCEDURE — 99291 CRITICAL CARE FIRST HOUR: CPT

## 2022-02-20 PROCEDURE — 70551 MRI BRAIN STEM W/O DYE: CPT | Mod: 26,MA

## 2022-02-20 PROCEDURE — 70450 CT HEAD/BRAIN W/O DYE: CPT | Mod: 26,MA

## 2022-02-20 PROCEDURE — 93010 ELECTROCARDIOGRAM REPORT: CPT

## 2022-02-20 RX ORDER — ALTEPLASE 100 MG
6.1 KIT INTRAVENOUS ONCE
Refills: 0 | Status: DISCONTINUED | OUTPATIENT
Start: 2022-02-20 | End: 2022-02-20

## 2022-02-20 RX ORDER — ALTEPLASE 100 MG
54.9 KIT INTRAVENOUS ONCE
Refills: 0 | Status: DISCONTINUED | OUTPATIENT
Start: 2022-02-20 | End: 2022-02-20

## 2022-02-20 NOTE — ED PROVIDER NOTE - NS ED ROS FT
Review of Systems:  CONSTITUTIONAL: No fever   SKIN: No rash  HEMATOLOGIC: No abnormal bleeding   EYES: No eye pain, No blurred vision  ENT: No sore throat, No neck pain, No rhinorrhea   RESPIRATORY: No shortness of breath, No cough  CARDIAC: No chest pain, No palpitations  GI: No abdominal pain, No nausea, No vomiting   : No dysuria  MUSCULOSKELETAL: No joint paint, No swelling, No back pain  NEUROLOGIC: +focal weakness, No headache, No dizziness  All other systems negative, unless specified in HPI

## 2022-02-20 NOTE — ED PROVIDER NOTE - CLINICAL SUMMARY MEDICAL DECISION MAKING FREE TEXT BOX
Patient presented with acute onset of dysarthria, left-sided facial droop and left-sided weakness that began 1 hour prior to arrival.  On arrival, patient afebrile, hemodynamically stable, but with heart findings on neurological exam as documented.  Code stroke called from triage and obtained emergent CT head (patient reportedly anaphylactic to IV contrast as documented in progress note) which was negative for acute hemorrhage.  On neurology's evaluation, patient endorses bloody stool over the past few days and therefore was not a TPA candidate.  Per neuro, recommending stat MRI which was obtained and negative for stroke.  However patient persistently symptomatic, and therefore per neuro, recommending admission for further work-up.  Patient agreeable plan.  Hemodynamically stable time admission.

## 2022-02-20 NOTE — ED PROVIDER NOTE - ATTENDING CONTRIBUTION TO CARE
61-year-old female past medical history of lupus and hypothyroidism, presenting with acute onset of dysarthria and left-sided weakness and facial droop 1 h prior to arrival.  States she is having difficulty saying words although she knows exactly what she wants to say.  Denies pain otherwise denies fevers, headache, vision changes, nausea vomiting, or any other complaints.    Vital Signs: I have reviewed the initial vital signs.  Constitutional: NAD, well-nourished, appears stated age, no acute distress.  HEENT: Airway patent, moist MM, no erythema/swelling/deformity of oral structures. EOMI, PERRLA.  CV: regular rate, regular rhythm, well-perfused extremities, 2+ b/l DP and radial pulses equal.  Lungs: BCTA, no increased WOB.  ABD: NTND, no guarding or rebound, no pulsatile mass, no hernias.   MSK: Neck supple, nontender, nl ROM, no stepoff. Chest nontender. Back nontender in TLS spine or to b/l bony structures or flanks. Ext nontender, nl rom, no deformity.   INTEG: Skin warm, dry, no rash.  NEURO: (+) dysarthric, difficulty forming words, (+) L facial droop that spares forehead, LUE and LLE 4/5 strength with drift, 5/5 strength RUE and RLE.   PSYCH: Calm, cooperative, normal affect and interaction.    Code stroke and neurology at bedside.  Will obtain CTs as per neuro recs, labs, reeval.

## 2022-02-20 NOTE — ED PROVIDER NOTE - OBJECTIVE STATEMENT
62 y/o F PMHx asthma, hypothyroidism, SLE presents to ED with stroke-like symptoms including dysarthria and left sided facial droop and left sided weakness starting 1 hour PTA. Pt having difficulty speaking. No vision changes, vomiting, headache.

## 2022-02-20 NOTE — ED ADULT NURSE NOTE - NS ED NURSE DISCH DISPOSITION
Admitted Cimetidine Counseling:  I discussed with the patient the risks of Cimetidine including but not limited to gynecomastia, headache, diarrhea, nausea, drowsiness, arrhythmias, pancreatitis, skin rashes, psychosis, bone marrow suppression and kidney toxicity. Ilumya Counseling: I discussed with the patient the risks of tildrakizumab including but not limited to immunosuppression, malignancy, posterior leukoencephalopathy syndrome, and serious infections.  The patient understands that monitoring is required including a PPD at baseline and must alert us or the primary physician if symptoms of infection or other concerning signs are noted. Hydroquinone Pregnancy And Lactation Text: This medication has not been assigned a Pregnancy Risk Category but animal studies failed to show danger with the topical medication. It is unknown if the medication is excreted in breast milk. Minocycline Pregnancy And Lactation Text: This medication is Pregnancy Category D and not consider safe during pregnancy. It is also excreted in breast milk. Bexarotene Pregnancy And Lactation Text: This medication is Pregnancy Category X and should not be given to women who are pregnant or may become pregnant. This medication should not be used if you are breast feeding. Nsaids Counseling: NSAID Counseling: I discussed with the patient that NSAIDs should be taken with food. Prolonged use of NSAIDs can result in the development of stomach ulcers.  Patient advised to stop taking NSAIDs if abdominal pain occurs.  The patient verbalized understanding of the proper use and possible adverse effects of NSAIDs.  All of the patient's questions and concerns were addressed. Simponi Pregnancy And Lactation Text: The risk during pregnancy and breastfeeding is uncertain with this medication. Zyclara Counseling:  I discussed with the patient the risks of imiquimod including but not limited to erythema, scaling, itching, weeping, crusting, and pain.  Patient understands that the inflammatory response to imiquimod is variable from person to person and was educated regarded proper titration schedule.  If flu-like symptoms develop, patient knows to discontinue the medication and contact us. Carac Pregnancy And Lactation Text: This medication is Pregnancy Category X and contraindicated in pregnancy and in women who may become pregnant. It is unknown if this medication is excreted in breast milk. Cimzia Counseling:  I discussed with the patient the risks of Cimzia including but not limited to immunosuppression, allergic reactions and infections.  The patient understands that monitoring is required including a PPD at baseline and must alert us or the primary physician if symptoms of infection or other concerning signs are noted. Fluconazole Pregnancy And Lactation Text: This medication is Pregnancy Category C and it isn't know if it is safe during pregnancy. It is also excreted in breast milk. Zyclara Pregnancy And Lactation Text: This medication is Pregnancy Category C. It is unknown if this medication is excreted in breast milk. Griseofulvin Counseling:  I discussed with the patient the risks of griseofulvin including but not limited to photosensitivity, cytopenia, liver damage, nausea/vomiting and severe allergy.  The patient understands that this medication is best absorbed when taken with a fatty meal (e.g., ice cream or french fries). Cimzia Pregnancy And Lactation Text: This medication crosses the placenta but can be considered safe in certain situations. Cimzia may be excreted in breast milk. 5-Fu Counseling: 5-Fluorouracil Counseling:  I discussed with the patient the risks of 5-fluorouracil including but not limited to erythema, scaling, itching, weeping, crusting, and pain. Clindamycin Counseling: I counseled the patient regarding use of clindamycin as an antibiotic for prophylactic and/or therapeutic purposes. Clindamycin is active against numerous classes of bacteria, including skin bacteria. Side effects may include nausea, diarrhea, gastrointestinal upset, rash, hives, yeast infections, and in rare cases, colitis. Dapsone Pregnancy And Lactation Text: This medication is Pregnancy Category C and is not considered safe during pregnancy or breast feeding. Methotrexate Counseling:  Patient counseled regarding adverse effects of methotrexate including but not limited to nausea, vomiting, abnormalities in liver function tests. Patients may develop mouth sores, rash, diarrhea, and abnormalities in blood counts. The patient understands that monitoring is required including LFT's and blood counts.  There is a rare possibility of scarring of the liver and lung problems that can occur when taking methotrexate. Persistent nausea, loss of appetite, pale stools, dark urine, cough, and shortness of breath should be reported immediately. Patient advised to discontinue methotrexate treatment at least three months before attempting to become pregnant.  I discussed the need for folate supplements while taking methotrexate.  These supplements can decrease side effects during methotrexate treatment. The patient verbalized understanding of the proper use and possible adverse effects of methotrexate.  All of the patient's questions and concerns were addressed. Hydroxyzine Pregnancy And Lactation Text: This medication is not safe during pregnancy and should not be taken. It is also excreted in breast milk and breast feeding isn't recommended. Solaraze Pregnancy And Lactation Text: This medication is Pregnancy Category B and is considered safe. There is some data to suggest avoiding during the third trimester. It is unknown if this medication is excreted in breast milk. Thalidomide Pregnancy And Lactation Text: This medication is Pregnancy Category X and is absolutely contraindicated during pregnancy. It is unknown if it is excreted in breast milk. Xolair Pregnancy And Lactation Text: This medication is Pregnancy Category B and is considered safe during pregnancy. This medication is excreted in breast milk. Spironolactone Counseling: Patient advised regarding risks of diarrhea, abdominal pain, hyperkalemia, birth defects (for female patients), liver toxicity and renal toxicity. The patient may need blood work to monitor liver and kidney function and potassium levels while on therapy. The patient verbalized understanding of the proper use and possible adverse effects of spironolactone.  All of the patient's questions and concerns were addressed. Topical Retinoid counseling:  Patient advised to apply a pea-sized amount only at bedtime and wait 30 minutes after washing their face before applying.  If too drying, patient may add a non-comedogenic moisturizer. The patient verbalized understanding of the proper use and possible adverse effects of retinoids.  All of the patient's questions and concerns were addressed. Spironolactone Pregnancy And Lactation Text: This medication can cause feminization of the male fetus and should be avoided during pregnancy. The active metabolite is also found in breast milk. Valtrex Counseling: I discussed with the patient the risks of valacyclovir including but not limited to kidney damage, nausea, vomiting and severe allergy.  The patient understands that if the infection seems to be worsening or is not improving, they are to call. Imiquimod Counseling:  I discussed with the patient the risks of imiquimod including but not limited to erythema, scaling, itching, weeping, crusting, and pain.  Patient understands that the inflammatory response to imiquimod is variable from person to person and was educated regarded proper titration schedule.  If flu-like symptoms develop, patient knows to discontinue the medication and contact us. Cimetidine Pregnancy And Lactation Text: This medication is Pregnancy Category B and is considered safe during pregnancy. It is also excreted in breast milk and breast feeding isn't recommended. Isotretinoin Counseling: Patient should get monthly blood tests, not donate blood, not drive at night if vision affected, not share medication, and not undergo elective surgery for 6 months after tx completed. Side effects reviewed, pt to contact office should one occur. Nsaids Pregnancy And Lactation Text: These medications are considered safe up to 30 weeks gestation. It is excreted in breast milk. Quinolones Counseling:  I discussed with the patient the risks of fluoroquinolones including but not limited to GI upset, allergic reaction, drug rash, diarrhea, dizziness, photosensitivity, yeast infections, liver function test abnormalities, tendonitis/tendon rupture. Stelara Counseling:  I discussed with the patient the risks of ustekinumab including but not limited to immunosuppression, malignancy, posterior leukoencephalopathy syndrome, and serious infections.  The patient understands that monitoring is required including a PPD at baseline and must alert us or the primary physician if symptoms of infection or other concerning signs are noted. Stelara Pregnancy And Lactation Text: This medication is Pregnancy Category B and is considered safe during pregnancy. It is unknown if this medication is excreted in breast milk. Isotretinoin Pregnancy And Lactation Text: This medication is Pregnancy Category X and is considered extremely dangerous during pregnancy. It is unknown if it is excreted in breast milk. Odomzo Counseling- I discussed with the patient the risks of Odomzo including but not limited to nausea, vomiting, diarrhea, constipation, weight loss, changes in the sense of taste, decreased appetite, muscle spasms, and hair loss.  The patient verbalized understanding of the proper use and possible adverse effects of Odomzo.  All of the patient's questions and concerns were addressed. Griseofulvin Pregnancy And Lactation Text: This medication is Pregnancy Category X and is known to cause serious birth defects. It is unknown if this medication is excreted in breast milk but breast feeding should be avoided. Cosentyx Counseling:  I discussed with the patient the risks of Cosentyx including but not limited to worsening of Crohn's disease, immunosuppression, allergic reactions and infections.  The patient understands that monitoring is required including a PPD at baseline and must alert us or the primary physician if symptoms of infection or other concerning signs are noted. Clindamycin Pregnancy And Lactation Text: This medication can be used in pregnancy if certain situations. Clindamycin is also present in breast milk. Methotrexate Pregnancy And Lactation Text: This medication is Pregnancy Category X and is known to cause fetal harm. This medication is excreted in breast milk. Detail Level: Detailed Erivedge Counseling- I discussed with the patient the risks of Erivedge including but not limited to nausea, vomiting, diarrhea, constipation, weight loss, changes in the sense of taste, decreased appetite, muscle spasms, and hair loss.  The patient verbalized understanding of the proper use and possible adverse effects of Erivedge.  All of the patient's questions and concerns were addressed. Prednisone Counseling:  I discussed with the patient the risks of prolonged use of prednisone including but not limited to weight gain, insomnia, osteoporosis, mood changes, diabetes, susceptibility to infection, glaucoma and high blood pressure.  In cases where prednisone use is prolonged, patients should be monitored with blood pressure checks, serum glucose levels and an eye exam.  Additionally, the patient may need to be placed on GI prophylaxis, PCP prophylaxis, and calcium and vitamin D supplementation and/or a bisphosphonate.  The patient verbalized understanding of the proper use and the possible adverse effects of prednisone.  All of the patient's questions and concerns were addressed. Doxycycline Counseling:  Patient counseled regarding possible photosensitivity and increased risk for sunburn.  Patient instructed to avoid sunlight, if possible.  When exposed to sunlight, patients should wear protective clothing, sunglasses, and sunscreen.  The patient was instructed to call the office immediately if the following severe adverse effects occur:  hearing changes, easy bruising/bleeding, severe headache, or vision changes.  The patient verbalized understanding of the proper use and possible adverse effects of doxycycline.  All of the patient's questions and concerns were addressed. Valtrex Pregnancy And Lactation Text: this medication is Pregnancy Category B and is considered safe during pregnancy. This medication is not directly found in breast milk but it's metabolite acyclovir is present. Albendazole Counseling:  I discussed with the patient the risks of albendazole including but not limited to cytopenia, kidney damage, nausea/vomiting and severe allergy.  The patient understands that this medication is being used in an off-label manner. Azathioprine Counseling:  I discussed with the patient the risks of azathioprine including but not limited to myelosuppression, immunosuppression, hepatotoxicity, lymphoma, and infections.  The patient understands that monitoring is required including baseline LFTs, Creatinine, possible TPMP genotyping and weekly CBCs for the first month and then every 2 weeks thereafter.  The patient verbalized understanding of the proper use and possible adverse effects of azathioprine.  All of the patient's questions and concerns were addressed. SSKI Counseling:  I discussed with the patient the risks of SSKI including but not limited to thyroid abnormalities, metallic taste, GI upset, fever, headache, acne, arthralgias, paraesthesias, lymphadenopathy, easy bleeding, arrhythmias, and allergic reaction. Infliximab Counseling:  I discussed with the patient the risks of infliximab including but not limited to myelosuppression, immunosuppression, autoimmune hepatitis, demyelinating diseases, lymphoma, and serious infections.  The patient understands that monitoring is required including a PPD at baseline and must alert us or the primary physician if symptoms of infection or other concerning signs are noted. Doxepin Counseling:  Patient advised that the medication is sedating and not to drive a car after taking this medication. Patient informed of potential adverse effects including but not limited to dry mouth, urinary retention, and blurry vision.  The patient verbalized understanding of the proper use and possible adverse effects of doxepin.  All of the patient's questions and concerns were addressed. Arava Counseling:  Patient counseled regarding adverse effects of Arava including but not limited to nausea, vomiting, abnormalities in liver function tests. Patients may develop mouth sores, rash, diarrhea, and abnormalities in blood counts. The patient understands that monitoring is required including LFTs and blood counts.  There is a rare possibility of scarring of the liver and lung problems that can occur when taking methotrexate. Persistent nausea, loss of appetite, pale stools, dark urine, cough, and shortness of breath should be reported immediately. Patient advised to discontinue Arava treatment and consult with a physician prior to attempting conception. The patient will have to undergo a treatment to eliminate Arava from the body prior to conception. Minoxidil Counseling: Minoxidil is a topical medication which can increase blood flow where it is applied. It is uncertain how this medication increases hair growth. Side effects are uncommon and include stinging and allergic reactions. Doxepin Pregnancy And Lactation Text: This medication is Pregnancy Category C and it isn't known if it is safe during pregnancy. It is also excreted in breast milk and breast feeding isn't recommended. Taltz Counseling: I discussed with the patient the risks of ixekizumab including but not limited to immunosuppression, serious infections, worsening of inflammatory bowel disease and drug reactions.  The patient understands that monitoring is required including a PPD at baseline and must alert us or the primary physician if symptoms of infection or other concerning signs are noted. Rifampin Counseling: I discussed with the patient the risks of rifampin including but not limited to liver damage, kidney damage, red-orange body fluids, nausea/vomiting and severe allergy. Drysol Counseling:  I discussed with the patient the risks of drysol/aluminum chloride including but not limited to skin rash, itching, irritation, burning. Itraconazole Counseling:  I discussed with the patient the risks of itraconazole including but not limited to liver damage, nausea/vomiting, neuropathy, and severe allergy.  The patient understands that this medication is best absorbed when taken with acidic beverages such as non-diet cola or ginger ale.  The patient understands that monitoring is required including baseline LFTs and repeat LFTs at intervals.  The patient understands that they are to contact us or the primary physician if concerning signs are noted. Dupixent Counseling: I discussed with the patient the risks of dupilumab including but not limited to eye infection and irritation, cold sores, injection site reactions, worsening of asthma, allergic reactions and increased risk of parasitic infection.  Live vaccines should be avoided while taking dupilumab. Dupilumab will also interact with certain medications such as warfarin and cyclosporine. The patient understands that monitoring is required and they must alert us or the primary physician if symptoms of infection or other concerning signs are noted. Drysol Pregnancy And Lactation Text: This medication is considered safe during pregnancy and breast feeding. Doxycycline Pregnancy And Lactation Text: This medication is Pregnancy Category D and not consider safe during pregnancy. It is also excreted in breast milk but is considered safe for shorter treatment courses. Prednisone Pregnancy And Lactation Text: This medication is Pregnancy Category C and it isn't know if it is safe during pregnancy. This medication is excreted in breast milk. Gabapentin Counseling: I discussed with the patient the risks of gabapentin including but not limited to dizziness, somnolence, fatigue and ataxia. Albendazole Pregnancy And Lactation Text: This medication is Pregnancy Category C and it isn't known if it is safe during pregnancy. It is also excreted in breast milk. High Dose Vitamin A Counseling: Side effects reviewed, pt to contact office should one occur. Tazorac Counseling:  Patient advised that medication is irritating and drying.  Patient may need to apply sparingly and wash off after an hour before eventually leaving it on overnight.  The patient verbalized understanding of the proper use and possible adverse effects of tazorac.  All of the patient's questions and concerns were addressed. Azathioprine Pregnancy And Lactation Text: This medication is Pregnancy Category D and isn't considered safe during pregnancy. It is unknown if this medication is excreted in breast milk. Tazorac Pregnancy And Lactation Text: This medication is not safe during pregnancy. It is unknown if this medication is excreted in breast milk. High Dose Vitamin A Pregnancy And Lactation Text: High dose vitamin A therapy is contraindicated during pregnancy and breast feeding. Azithromycin Counseling:  I discussed with the patient the risks of azithromycin including but not limited to GI upset, allergic reaction, drug rash, diarrhea, and yeast infections. Ivermectin Counseling:  Patient instructed to take medication on an empty stomach with a full glass of water.  Patient informed of potential adverse effects including but not limited to nausea, diarrhea, dizziness, itching, and swelling of the extremities or lymph nodes.  The patient verbalized understanding of the proper use and possible adverse effects of ivermectin.  All of the patient's questions and concerns were addressed. Use Enhanced Medication Counseling?: No Cellcept Counseling:  I discussed with the patient the risks of mycophenolate mofetil including but not limited to infection/immunosuppression, GI upset, hypokalemia, hypercholesterolemia, bone marrow suppression, lymphoproliferative disorders, malignancy, GI ulceration/bleed/perforation, colitis, interstitial lung disease, kidney failure, progressive multifocal leukoencephalopathy, and birth defects.  The patient understands that monitoring is required including a baseline creatinine and regular CBC testing. In addition, patient must alert us immediately if symptoms of infection or other concerning signs are noted. Rifampin Pregnancy And Lactation Text: This medication is Pregnancy Category C and it isn't know if it is safe during pregnancy. It is also excreted in breast milk and should not be used if you are breast feeding. Otezla Counseling: The side effects of Otezla were discussed with the patient, including but not limited to worsening or new depression, weight loss, diarrhea, nausea, upper respiratory tract infection, and headache. Patient instructed to call the office should any adverse effect occur.  The patient verbalized understanding of the proper use and possible adverse effects of Otezla.  All the patient's questions and concerns were addressed. Tetracycline Counseling: Patient counseled regarding possible photosensitivity and increased risk for sunburn.  Patient instructed to avoid sunlight, if possible.  When exposed to sunlight, patients should wear protective clothing, sunglasses, and sunscreen.  The patient was instructed to call the office immediately if the following severe adverse effects occur:  hearing changes, easy bruising/bleeding, severe headache, or vision changes.  The patient verbalized understanding of the proper use and possible adverse effects of tetracycline.  All of the patient's questions and concerns were addressed. Patient understands to avoid pregnancy while on therapy due to potential birth defects. Tremfya Counseling: I discussed with the patient the risks of guselkumab including but not limited to immunosuppression, serious infections, worsening of inflammatory bowel disease and drug reactions.  The patient understands that monitoring is required including a PPD at baseline and must alert us or the primary physician if symptoms of infection or other concerning signs are noted. Otezla Pregnancy And Lactation Text: This medication is Pregnancy Category C and it isn't known if it is safe during pregnancy. It is unknown if it is excreted in breast milk. Dupixent Pregnancy And Lactation Text: This medication likely crosses the placenta but the risk for the fetus is uncertain. This medication is excreted in breast milk. Elidel Counseling: Patient may experience a mild burning sensation during topical application. Elidel is not approved in children less than 2 years of age. There have been case reports of hematologic and skin malignancies in patients using topical calcineurin inhibitors although causality is questionable. Ketoconazole Counseling:   Patient counseled regarding improving absorption with orange juice.  Adverse effects include but are not limited to breast enlargement, headache, diarrhea, nausea, upset stomach, liver function test abnormalities, taste disturbance, and stomach pain.  There is a rare possibility of liver failure that can occur when taking ketoconazole. The patient understands that monitoring of LFTs may be required, especially at baseline. The patient verbalized understanding of the proper use and possible adverse effects of ketoconazole.  All of the patient's questions and concerns were addressed. Gabapentin Pregnancy And Lactation Text: This medication is Pregnancy Category C and isn't considered safe during pregnancy. It is excreted in breast milk. Rituxan Counseling:  I discussed with the patient the risks of Rituxan infusions. Side effects can include infusion reactions, severe drug rashes including mucocutaneous reactions, reactivation of latent hepatitis and other infections and rarely progressive multifocal leukoencephalopathy.  All of the patient's questions and concerns were addressed. Erythromycin Counseling:  I discussed with the patient the risks of erythromycin including but not limited to GI upset, allergic reaction, drug rash, diarrhea, increase in liver enzymes, and yeast infections. Rituxan Pregnancy And Lactation Text: This medication is Pregnancy Category C and it isn't know if it is safe during pregnancy. It is unknown if this medication is excreted in breast milk but similar antibodies are known to be excreted. Erythromycin Pregnancy And Lactation Text: This medication is Pregnancy Category B and is considered safe during pregnancy. It is also excreted in breast milk. Glycopyrrolate Counseling:  I discussed with the patient the risks of glycopyrrolate including but not limited to skin rash, drowsiness, dry mouth, difficulty urinating, and blurred vision. Topical Clindamycin Counseling: Patient counseled that this medication may cause skin irritation or allergic reactions.  In the event of skin irritation, the patient was advised to reduce the amount of the drug applied or use it less frequently.   The patient verbalized understanding of the proper use and possible adverse effects of clindamycin.  All of the patient's questions and concerns were addressed. Clofazimine Counseling:  I discussed with the patient the risks of clofazimine including but not limited to skin and eye pigmentation, liver damage, nausea/vomiting, gastrointestinal bleeding and allergy. Picato Counseling:  I discussed with the patient the risks of Picato including but not limited to erythema, scaling, itching, weeping, crusting, and pain. Azithromycin Pregnancy And Lactation Text: This medication is considered safe during pregnancy and is also secreted in breast milk. Cyclophosphamide Counseling:  I discussed with the patient the risks of cyclophosphamide including but not limited to hair loss, hormonal abnormalities, decreased fertility, abdominal pain, diarrhea, nausea and vomiting, bone marrow suppression and infection. The patient understands that monitoring is required while taking this medication. Bactrim Counseling:  I discussed with the patient the risks of sulfa antibiotics including but not limited to GI upset, allergic reaction, drug rash, diarrhea, dizziness, photosensitivity, and yeast infections.  Rarely, more serious reactions can occur including but not limited to aplastic anemia, agranulocytosis, methemoglobinemia, blood dyscrasias, liver or kidney failure, lung infiltrates or desquamative/blistering drug rashes. Oxybutynin Counseling:  I discussed with the patient the risks of oxybutynin including but not limited to skin rash, drowsiness, dry mouth, difficulty urinating, and blurred vision. Ketoconazole Pregnancy And Lactation Text: This medication is Pregnancy Category C and it isn't know if it is safe during pregnancy. It is also excreted in breast milk and breast feeding isn't recommended. Enbrel Counseling:  I discussed with the patient the risks of etanercept including but not limited to myelosuppression, immunosuppression, autoimmune hepatitis, demyelinating diseases, lymphoma, and infections.  The patient understands that monitoring is required including a PPD at baseline and must alert us or the primary physician if symptoms of infection or other concerning signs are noted. Terbinafine Counseling: Patient counseling regarding adverse effects of terbinafine including but not limited to headache, diarrhea, rash, upset stomach, liver function test abnormalities, itching, taste/smell disturbance, nausea, abdominal pain, and flatulence.  There is a rare possibility of liver failure that can occur when taking terbinafine.  The patient understands that a baseline LFT and kidney function test may be required. The patient verbalized understanding of the proper use and possible adverse effects of terbinafine.  All of the patient's questions and concerns were addressed. Eucrisa Counseling: Patient may experience a mild burning sensation during topical application. Eucrisa is not approved in children less than 2 years of age. Metronidazole Counseling:  I discussed with the patient the risks of metronidazole including but not limited to seizures, nausea/vomiting, a metallic taste in the mouth, nausea/vomiting and severe allergy. Siliq Counseling:  I discussed with the patient the risks of Siliq including but not limited to new or worsening depression, suicidal thoughts and behavior, immunosuppression, malignancy, posterior leukoencephalopathy syndrome, and serious infections.  The patient understands that monitoring is required including a PPD at baseline and must alert us or the primary physician if symptoms of infection or other concerning signs are noted. There is also a special program designed to monitor depression which is required with Siliq. Acitretin Counseling:  I discussed with the patient the risks of acitretin including but not limited to hair loss, dry lips/skin/eyes, liver damage, hyperlipidemia, depression/suicidal ideation, photosensitivity.  Serious rare side effects can include but are not limited to pancreatitis, pseudotumor cerebri, bony changes, clot formation/stroke/heart attack.  Patient understands that alcohol is contraindicated since it can result in liver toxicity and significantly prolong the elimination of the drug by many years. Glycopyrrolate Pregnancy And Lactation Text: This medication is Pregnancy Category B and is considered safe during pregnancy. It is unknown if it is excreted breast milk. Topical Clindamycin Pregnancy And Lactation Text: This medication is Pregnancy Category B and is considered safe during pregnancy. It is unknown if it is excreted in breast milk. Benzoyl Peroxide Counseling: Patient counseled that medicine may cause skin irritation and bleach clothing.  In the event of skin irritation, the patient was advised to reduce the amount of the drug applied or use it less frequently.   The patient verbalized understanding of the proper use and possible adverse effects of benzoyl peroxide.  All of the patient's questions and concerns were addressed. Benzoyl Peroxide Pregnancy And Lactation Text: This medication is Pregnancy Category C. It is unknown if benzoyl peroxide is excreted in breast milk. Bactrim Pregnancy And Lactation Text: This medication is Pregnancy Category D and is known to cause fetal risk.  It is also excreted in breast milk. Topical Sulfur Applications Counseling: Topical Sulfur Counseling: Patient counseled that this medication may cause skin irritation or allergic reactions.  In the event of skin irritation, the patient was advised to reduce the amount of the drug applied or use it less frequently.   The patient verbalized understanding of the proper use and possible adverse effects of topical sulfur application.  All of the patient's questions and concerns were addressed. Cyclophosphamide Pregnancy And Lactation Text: This medication is Pregnancy Category D and it isn't considered safe during pregnancy. This medication is excreted in breast milk. Colchicine Counseling:  Patient counseled regarding adverse effects including but not limited to stomach upset (nausea, vomiting, stomach pain, or diarrhea).  Patient instructed to limit alcohol consumption while taking this medication.  Colchicine may reduce blood counts especially with prolonged use.  The patient understands that monitoring of kidney function and blood counts may be required, especially at baseline. The patient verbalized understanding of the proper use and possible adverse effects of colchicine.  All of the patient's questions and concerns were addressed. Protopic Counseling: Patient may experience a mild burning sensation during topical application. Protopic is not approved in children less than 2 years of age. There have been case reports of hematologic and skin malignancies in patients using topical calcineurin inhibitors although causality is questionable. Xeljanz Counseling: I discussed with the patient the risks of Xeljanz therapy including increased risk of infection, liver issues, headache, diarrhea, or cold symptoms. Live vaccines should be avoided. They were instructed to call if they have any problems. Birth Control Pills Counseling: Birth Control Pill Counseling: I discussed with the patient the potential side effects of OCPs including but not limited to increased risk of stroke, heart attack, thrombophlebitis, deep venous thrombosis, hepatic adenomas, breast changes, GI upset, headaches, and depression.  The patient verbalized understanding of the proper use and possible adverse effects of OCPs. All of the patient's questions and concerns were addressed. Xelbiaz Pregnancy And Lactation Text: This medication is Pregnancy Category D and is not considered safe during pregnancy.  The risk during breast feeding is also uncertain. Humira Counseling:  I discussed with the patient the risks of adalimumab including but not limited to myelosuppression, immunosuppression, autoimmune hepatitis, demyelinating diseases, lymphoma, and serious infections.  The patient understands that monitoring is required including a PPD at baseline and must alert us or the primary physician if symptoms of infection or other concerning signs are noted. Acitretin Pregnancy And Lactation Text: This medication is Pregnancy Category X and should not be given to women who are pregnant or may become pregnant in the future. This medication is excreted in breast milk. Hydroxychloroquine Counseling:  I discussed with the patient that a baseline ophthalmologic exam is needed at the start of therapy and every year thereafter while on therapy. A CBC may also be warranted for monitoring.  The side effects of this medication were discussed with the patient, including but not limited to agranulocytosis, aplastic anemia, seizures, rashes, retinopathy, and liver toxicity. Patient instructed to call the office should any adverse effect occur.  The patient verbalized understanding of the proper use and possible adverse effects of Plaquenil.  All the patient's questions and concerns were addressed. Metronidazole Pregnancy And Lactation Text: This medication is Pregnancy Category B and considered safe during pregnancy.  It is also excreted in breast milk. Simponi Counseling:  I discussed with the patient the risks of golimumab including but not limited to myelosuppression, immunosuppression, autoimmune hepatitis, demyelinating diseases, lymphoma, and serious infections.  The patient understands that monitoring is required including a PPD at baseline and must alert us or the primary physician if symptoms of infection or other concerning signs are noted. Minocycline Counseling: Patient advised regarding possible photosensitivity and discoloration of the teeth, skin, lips, tongue and gums.  Patient instructed to avoid sunlight, if possible.  When exposed to sunlight, patients should wear protective clothing, sunglasses, and sunscreen.  The patient was instructed to call the office immediately if the following severe adverse effects occur:  hearing changes, easy bruising/bleeding, severe headache, or vision changes.  The patient verbalized understanding of the proper use and possible adverse effects of minocycline.  All of the patient's questions and concerns were addressed. Hydroxychloroquine Pregnancy And Lactation Text: This medication has been shown to cause fetal harm but it isn't assigned a Pregnancy Risk Category. There are small amounts excreted in breast milk. Bexarotene Counseling:  I discussed with the patient the risks of bexarotene including but not limited to hair loss, dry lips/skin/eyes, liver abnormalities, hyperlipidemia, pancreatitis, depression/suicidal ideation, photosensitivity, drug rash/allergic reactions, hypothyroidism, anemia, leukopenia, infection, cataracts, and teratogenicity.  Patient understands that they will need regular blood tests to check lipid profile, liver function tests, white blood cell count, thyroid function tests and pregnancy test if applicable. Fluconazole Counseling:  Patient counseled regarding adverse effects of fluconazole including but not limited to headache, diarrhea, nausea, upset stomach, liver function test abnormalities, taste disturbance, and stomach pain.  There is a rare possibility of liver failure that can occur when taking fluconazole.  The patient understands that monitoring of LFTs and kidney function test may be required, especially at baseline. The patient verbalized understanding of the proper use and possible adverse effects of fluconazole.  All of the patient's questions and concerns were addressed. Topical Sulfur Applications Pregnancy And Lactation Text: This medication is Pregnancy Category C and has an unknown safety profile during pregnancy. It is unknown if this topical medication is excreted in breast milk. Carac Counseling:  I discussed with the patient the risks of Carac including but not limited to erythema, scaling, itching, weeping, crusting, and pain. Sski Pregnancy And Lactation Text: This medication is Pregnancy Category D and isn't considered safe during pregnancy. It is excreted in breast milk. Cyclosporine Counseling:  I discussed with the patient the risks of cyclosporine including but not limited to hypertension, gingival hyperplasia,myelosuppression, immunosuppression, liver damage, kidney damage, neurotoxicity, lymphoma, and serious infections. The patient understands that monitoring is required including baseline blood pressure, CBC, CMP, lipid panel and uric acid, and then 1-2 times monthly CMP and blood pressure. Protopic Pregnancy And Lactation Text: This medication is Pregnancy Category C. It is unknown if this medication is excreted in breast milk when applied topically. Cephalexin Counseling: I counseled the patient regarding use of cephalexin as an antibiotic for prophylactic and/or therapeutic purposes. Cephalexin (commonly prescribed under brand name Keflex) is a cephalosporin antibiotic which is active against numerous classes of bacteria, including most skin bacteria. Side effects may include nausea, diarrhea, gastrointestinal upset, rash, hives, yeast infections, and in rare cases, hepatitis, kidney disease, seizures, fever, confusion, neurologic symptoms, and others. Patients with severe allergies to penicillin medications are cautioned that there is about a 10% incidence of cross-reactivity with cephalosporins. When possible, patients with penicillin allergies should use alternatives to cephalosporins for antibiotic therapy. Dapsone Counseling: I discussed with the patient the risks of dapsone including but not limited to hemolytic anemia, agranulocytosis, rashes, methemoglobinemia, kidney failure, peripheral neuropathy, headaches, GI upset, and liver toxicity.  Patients who start dapsone require monitoring including baseline LFTs and weekly CBCs for the first month, then every month thereafter.  The patient verbalized understanding of the proper use and possible adverse effects of dapsone.  All of the patient's questions and concerns were addressed. Cephalexin Pregnancy And Lactation Text: This medication is Pregnancy Category B and considered safe during pregnancy.  It is also excreted in breast milk but can be used safely for shorter doses. Thalidomide Counseling: I discussed with the patient the risks of thalidomide including but not limited to birth defects, anxiety, weakness, chest pain, dizziness, cough and severe allergy. Solaraze Counseling:  I discussed with the patient the risks of Solaraze including but not limited to erythema, scaling, itching, weeping, crusting, and pain. Hydroxyzine Counseling: Patient advised that the medication is sedating and not to drive a car after taking this medication.  Patient informed of potential adverse effects including but not limited to dry mouth, urinary retention, and blurry vision.  The patient verbalized understanding of the proper use and possible adverse effects of hydroxyzine.  All of the patient's questions and concerns were addressed. Birth Control Pills Pregnancy And Lactation Text: This medication should be avoided if pregnant and for the first 30 days post-partum. Xolair Counseling:  Patient informed of potential adverse effects including but not limited to fever, muscle aches, rash and allergic reactions.  The patient verbalized understanding of the proper use and possible adverse effects of Xolair.  All of the patient's questions and concerns were addressed. Hydroquinone Counseling:  Patient advised that medication may result in skin irritation, lightening (hypopigmentation), dryness, and burning.  In the event of skin irritation, the patient was advised to reduce the amount of the drug applied or use it less frequently.  Rarely, spots that are treated with hydroquinone can become darker (pseudoochronosis).  Should this occur, patient instructed to stop medication and call the office. The patient verbalized understanding of the proper use and possible adverse effects of hydroquinone.  All of the patient's questions and concerns were addressed.

## 2022-02-20 NOTE — ED PROVIDER NOTE - PROGRESS NOTE DETAILS
HEIDY: Patient back from CT - no intracranial hemorrhage HEIDY: Patient anaphylactic to IV contrast. Spoke with telestroke - recommending just dry head CT for now, likely TPA given onset 1 hour ago and deficits on exam. Neuro NP at bedside HEIDY: Patient anaphylactic to IV contrast. Spoke with telestroke Dr. Monson - recommending just dry head CT for now, likely TPA given onset 1 hour ago and deficits on exam. Neuro NP at bedside HEIDY: Dr. Monson evaluated patient via telestroke monitor - patient now endorsing intermittent blood in stool over the past few days and therefore not TPA candidate per neuro. Recommending STAT MRI/MRA. HEIDY: Per Dr. Monson - MRI negative for stroke - admit to tele and neuro to follow.

## 2022-02-20 NOTE — ED PROVIDER NOTE - PHYSICAL EXAMINATION
CONSTITUTIONAL: Well-developed; well-nourished; in no acute distress.   SKIN: warm, dry  HEAD: Normocephalic; atraumatic.  EYES: no conjunctival injection. EOMI.   ENT: No nasal discharge; airway clear.  NECK: Supple; non tender.  CARD: S1, S2 normal; Regular rate and rhythm.   RESP: No wheezes, rales or rhonchi.  ABD: soft ntnd.   EXT: Normal ROM.  No lower extremity edema.   LYMPH: No acute cervical adenopathy.  NEURO: +L sided facial droop, L sided motor strength 4/5 with drift, right side 5/5.   PSYCH: Cooperative, appropriate.

## 2022-02-20 NOTE — ED ADULT NURSE NOTE - CAS TRG GEN SKIN CONDITION
Doxycycline Counseling:  Patient counseled regarding possible photosensitivity and increased risk for sunburn.  Patient instructed to avoid sunlight, if possible.  When exposed to sunlight, patients should wear protective clothing, sunglasses, and sunscreen.  The patient was instructed to call the office immediately if the following severe adverse effects occur:  hearing changes, easy bruising/bleeding, severe headache, or vision changes.  The patient verbalized understanding of the proper use and possible adverse effects of doxycycline.  All of the patient's questions and concerns were addressed.
Topical Retinoid counseling:  Patient advised to apply a pea-sized amount only at bedtime and wait 30 minutes after washing their face before applying.  If too drying, patient may add a non-comedogenic moisturizer. The patient verbalized understanding of the proper use and possible adverse effects of retinoids.  All of the patient's questions and concerns were addressed.
Bactrim Pregnancy And Lactation Text: This medication is Pregnancy Category D and is known to cause fetal risk.  It is also excreted in breast milk.
Topical Clindamycin Counseling: Patient counseled that this medication may cause skin irritation or allergic reactions.  In the event of skin irritation, the patient was advised to reduce the amount of the drug applied or use it less frequently.   The patient verbalized understanding of the proper use and possible adverse effects of clindamycin.  All of the patient's questions and concerns were addressed.
Isotretinoin Counseling: Patient should get monthly blood tests, not donate blood, not drive at night if vision affected, not share medication, and not undergo elective surgery for 6 months after tx completed. Side effects reviewed, pt to contact office should one occur.
Spironolactone Pregnancy And Lactation Text: This medication can cause feminization of the male fetus and should be avoided during pregnancy. The active metabolite is also found in breast milk.
Minocycline Counseling: Patient advised regarding possible photosensitivity and discoloration of the teeth, skin, lips, tongue and gums.  Patient instructed to avoid sunlight, if possible.  When exposed to sunlight, patients should wear protective clothing, sunglasses, and sunscreen.  The patient was instructed to call the office immediately if the following severe adverse effects occur:  hearing changes, easy bruising/bleeding, severe headache, or vision changes.  The patient verbalized understanding of the proper use and possible adverse effects of minocycline.  All of the patient's questions and concerns were addressed.
Benzoyl Peroxide Pregnancy And Lactation Text: This medication is Pregnancy Category C. It is unknown if benzoyl peroxide is excreted in breast milk.
Warm/Dry
Dapsone Pregnancy And Lactation Text: This medication is Pregnancy Category C and is not considered safe during pregnancy or breast feeding.
Azithromycin Counseling:  I discussed with the patient the risks of azithromycin including but not limited to GI upset, allergic reaction, drug rash, diarrhea, and yeast infections.
Sarecycline Counseling: Patient advised regarding possible photosensitivity and discoloration of the teeth, skin, lips, tongue and gums.  Patient instructed to avoid sunlight, if possible.  When exposed to sunlight, patients should wear protective clothing, sunglasses, and sunscreen.  The patient was instructed to call the office immediately if the following severe adverse effects occur:  hearing changes, easy bruising/bleeding, severe headache, or vision changes.  The patient verbalized understanding of the proper use and possible adverse effects of sarecycline.  All of the patient's questions and concerns were addressed.
Topical Retinoid Pregnancy And Lactation Text: This medication is Pregnancy Category C. It is unknown if this medication is excreted in breast milk.
Doxycycline Pregnancy And Lactation Text: This medication is Pregnancy Category D and not consider safe during pregnancy. It is also excreted in breast milk but is considered safe for shorter treatment courses.
Topical Clindamycin Pregnancy And Lactation Text: This medication is Pregnancy Category B and is considered safe during pregnancy. It is unknown if it is excreted in breast milk.
Birth Control Pills Counseling: Birth Control Pill Counseling: I discussed with the patient the potential side effects of OCPs including but not limited to increased risk of stroke, heart attack, thrombophlebitis, deep venous thrombosis, hepatic adenomas, breast changes, GI upset, headaches, and depression.  The patient verbalized understanding of the proper use and possible adverse effects of OCPs. All of the patient's questions and concerns were addressed.
Isotretinoin Pregnancy And Lactation Text: This medication is Pregnancy Category X and is considered extremely dangerous during pregnancy. It is unknown if it is excreted in breast milk.
Tetracycline Counseling: Patient counseled regarding possible photosensitivity and increased risk for sunburn.  Patient instructed to avoid sunlight, if possible.  When exposed to sunlight, patients should wear protective clothing, sunglasses, and sunscreen.  The patient was instructed to call the office immediately if the following severe adverse effects occur:  hearing changes, easy bruising/bleeding, severe headache, or vision changes.  The patient verbalized understanding of the proper use and possible adverse effects of tetracycline.  All of the patient's questions and concerns were addressed. Patient understands to avoid pregnancy while on therapy due to potential birth defects.
Minocycline Pregnancy And Lactation Text: This medication is Pregnancy Category D and not consider safe during pregnancy. It is also excreted in breast milk.
Erythromycin Counseling:  I discussed with the patient the risks of erythromycin including but not limited to GI upset, allergic reaction, drug rash, diarrhea, increase in liver enzymes, and yeast infections.
Topical Sulfur Applications Counseling: Topical Sulfur Counseling: Patient counseled that this medication may cause skin irritation or allergic reactions.  In the event of skin irritation, the patient was advised to reduce the amount of the drug applied or use it less frequently.   The patient verbalized understanding of the proper use and possible adverse effects of topical sulfur application.  All of the patient's questions and concerns were addressed.
Azithromycin Pregnancy And Lactation Text: This medication is considered safe during pregnancy and is also secreted in breast milk.
Birth Control Pills Pregnancy And Lactation Text: This medication should be avoided if pregnant and for the first 30 days post-partum.
High Dose Vitamin A Counseling: Side effects reviewed, pt to contact office should one occur.
Detail Level: Zone
Use Enhanced Medication Counseling?: No
Erythromycin Pregnancy And Lactation Text: This medication is Pregnancy Category B and is considered safe during pregnancy. It is also excreted in breast milk.
Tazorac Pregnancy And Lactation Text: This medication is not safe during pregnancy. It is unknown if this medication is excreted in breast milk.
Spironolactone Counseling: Patient advised regarding risks of diarrhea, abdominal pain, hyperkalemia, birth defects (for female patients), liver toxicity and renal toxicity. The patient may need blood work to monitor liver and kidney function and potassium levels while on therapy. The patient verbalized understanding of the proper use and possible adverse effects of spironolactone.  All of the patient's questions and concerns were addressed.
Bactrim Counseling:  I discussed with the patient the risks of sulfa antibiotics including but not limited to GI upset, allergic reaction, drug rash, diarrhea, dizziness, photosensitivity, and yeast infections.  Rarely, more serious reactions can occur including but not limited to aplastic anemia, agranulocytosis, methemoglobinemia, blood dyscrasias, liver or kidney failure, lung infiltrates or desquamative/blistering drug rashes.
Topical Sulfur Applications Pregnancy And Lactation Text: This medication is Pregnancy Category C and has an unknown safety profile during pregnancy. It is unknown if this topical medication is excreted in breast milk.
Dapsone Counseling: I discussed with the patient the risks of dapsone including but not limited to hemolytic anemia, agranulocytosis, rashes, methemoglobinemia, kidney failure, peripheral neuropathy, headaches, GI upset, and liver toxicity.  Patients who start dapsone require monitoring including baseline LFTs and weekly CBCs for the first month, then every month thereafter.  The patient verbalized understanding of the proper use and possible adverse effects of dapsone.  All of the patient's questions and concerns were addressed.
Benzoyl Peroxide Counseling: Patient counseled that medicine may cause skin irritation and bleach clothing.  In the event of skin irritation, the patient was advised to reduce the amount of the drug applied or use it less frequently.   The patient verbalized understanding of the proper use and possible adverse effects of benzoyl peroxide.  All of the patient's questions and concerns were addressed.
Tazorac Counseling:  Patient advised that medication is irritating and drying.  Patient may need to apply sparingly and wash off after an hour before eventually leaving it on overnight.  The patient verbalized understanding of the proper use and possible adverse effects of tazorac.  All of the patient's questions and concerns were addressed.
High Dose Vitamin A Pregnancy And Lactation Text: High dose vitamin A therapy is contraindicated during pregnancy and breast feeding.

## 2022-02-20 NOTE — ED ADULT TRIAGE NOTE - CHIEF COMPLAINT QUOTE
pt c/o dizziness and left jaw "tightness" since last night; sts since 930p the dizziness has gotten worse and is "having trouble speaking and getting the words out"

## 2022-02-21 DIAGNOSIS — Z98.890 OTHER SPECIFIED POSTPROCEDURAL STATES: Chronic | ICD-10-CM

## 2022-02-21 LAB
A1C WITH ESTIMATED AVERAGE GLUCOSE RESULT: 5.5 % — SIGNIFICANT CHANGE UP (ref 4–5.6)
ALBUMIN SERPL ELPH-MCNC: 4.5 G/DL — SIGNIFICANT CHANGE UP (ref 3.5–5.2)
ALP SERPL-CCNC: 108 U/L — SIGNIFICANT CHANGE UP (ref 30–115)
ALT FLD-CCNC: 38 U/L — SIGNIFICANT CHANGE UP (ref 0–41)
ANION GAP SERPL CALC-SCNC: 9 MMOL/L — SIGNIFICANT CHANGE UP (ref 7–14)
AST SERPL-CCNC: 32 U/L — SIGNIFICANT CHANGE UP (ref 0–41)
BASOPHILS # BLD AUTO: 0.04 K/UL — SIGNIFICANT CHANGE UP (ref 0–0.2)
BASOPHILS NFR BLD AUTO: 0.6 % — SIGNIFICANT CHANGE UP (ref 0–1)
BILIRUB SERPL-MCNC: 0.7 MG/DL — SIGNIFICANT CHANGE UP (ref 0.2–1.2)
BUN SERPL-MCNC: 14 MG/DL — SIGNIFICANT CHANGE UP (ref 10–20)
CALCIUM SERPL-MCNC: 9.6 MG/DL — SIGNIFICANT CHANGE UP (ref 8.5–10.1)
CHLORIDE SERPL-SCNC: 104 MMOL/L — SIGNIFICANT CHANGE UP (ref 98–110)
CHOLEST SERPL-MCNC: 231 MG/DL — HIGH
CO2 SERPL-SCNC: 26 MMOL/L — SIGNIFICANT CHANGE UP (ref 17–32)
CREAT SERPL-MCNC: 0.7 MG/DL — SIGNIFICANT CHANGE UP (ref 0.7–1.5)
EOSINOPHIL # BLD AUTO: 0.2 K/UL — SIGNIFICANT CHANGE UP (ref 0–0.7)
EOSINOPHIL NFR BLD AUTO: 2.9 % — SIGNIFICANT CHANGE UP (ref 0–8)
ESTIMATED AVERAGE GLUCOSE: 111 MG/DL — SIGNIFICANT CHANGE UP (ref 68–114)
FOLATE SERPL-MCNC: 14.2 NG/ML — SIGNIFICANT CHANGE UP
GLUCOSE SERPL-MCNC: 93 MG/DL — SIGNIFICANT CHANGE UP (ref 70–99)
HCT VFR BLD CALC: 39 % — SIGNIFICANT CHANGE UP (ref 37–47)
HDLC SERPL-MCNC: 68 MG/DL — SIGNIFICANT CHANGE UP
HGB BLD-MCNC: 12.9 G/DL — SIGNIFICANT CHANGE UP (ref 12–16)
IMM GRANULOCYTES NFR BLD AUTO: 0.3 % — SIGNIFICANT CHANGE UP (ref 0.1–0.3)
LIPID PNL WITH DIRECT LDL SERPL: 156 MG/DL — HIGH
LYMPHOCYTES # BLD AUTO: 2.19 K/UL — SIGNIFICANT CHANGE UP (ref 1.2–3.4)
LYMPHOCYTES # BLD AUTO: 31.6 % — SIGNIFICANT CHANGE UP (ref 20.5–51.1)
MAGNESIUM SERPL-MCNC: 2.3 MG/DL — SIGNIFICANT CHANGE UP (ref 1.8–2.4)
MCHC RBC-ENTMCNC: 31.9 PG — HIGH (ref 27–31)
MCHC RBC-ENTMCNC: 33.1 G/DL — SIGNIFICANT CHANGE UP (ref 32–37)
MCV RBC AUTO: 96.3 FL — SIGNIFICANT CHANGE UP (ref 81–99)
MONOCYTES # BLD AUTO: 0.67 K/UL — HIGH (ref 0.1–0.6)
MONOCYTES NFR BLD AUTO: 9.7 % — HIGH (ref 1.7–9.3)
NEUTROPHILS # BLD AUTO: 3.81 K/UL — SIGNIFICANT CHANGE UP (ref 1.4–6.5)
NEUTROPHILS NFR BLD AUTO: 54.9 % — SIGNIFICANT CHANGE UP (ref 42.2–75.2)
NON HDL CHOLESTEROL: 163 MG/DL — HIGH
NRBC # BLD: 0 /100 WBCS — SIGNIFICANT CHANGE UP (ref 0–0)
PLATELET # BLD AUTO: 320 K/UL — SIGNIFICANT CHANGE UP (ref 130–400)
POTASSIUM SERPL-MCNC: 5 MMOL/L — SIGNIFICANT CHANGE UP (ref 3.5–5)
POTASSIUM SERPL-SCNC: 5 MMOL/L — SIGNIFICANT CHANGE UP (ref 3.5–5)
PROT SERPL-MCNC: 6.7 G/DL — SIGNIFICANT CHANGE UP (ref 6–8)
RBC # BLD: 4.05 M/UL — LOW (ref 4.2–5.4)
RBC # FLD: 11.9 % — SIGNIFICANT CHANGE UP (ref 11.5–14.5)
SODIUM SERPL-SCNC: 139 MMOL/L — SIGNIFICANT CHANGE UP (ref 135–146)
TRIGL SERPL-MCNC: 96 MG/DL — SIGNIFICANT CHANGE UP
TROPONIN T SERPL-MCNC: <0.01 NG/ML — SIGNIFICANT CHANGE UP
TSH SERPL-MCNC: 0.14 UIU/ML — LOW (ref 0.27–4.2)
VIT B12 SERPL-MCNC: 398 PG/ML — SIGNIFICANT CHANGE UP (ref 232–1245)
WBC # BLD: 6.93 K/UL — SIGNIFICANT CHANGE UP (ref 4.8–10.8)
WBC # FLD AUTO: 6.93 K/UL — SIGNIFICANT CHANGE UP (ref 4.8–10.8)

## 2022-02-21 PROCEDURE — 99221 1ST HOSP IP/OBS SF/LOW 40: CPT | Mod: GC

## 2022-02-21 PROCEDURE — 93880 EXTRACRANIAL BILAT STUDY: CPT | Mod: 26

## 2022-02-21 PROCEDURE — 99221 1ST HOSP IP/OBS SF/LOW 40: CPT

## 2022-02-21 RX ORDER — ATORVASTATIN CALCIUM 80 MG/1
80 TABLET, FILM COATED ORAL AT BEDTIME
Refills: 0 | Status: DISCONTINUED | OUTPATIENT
Start: 2022-02-21 | End: 2022-02-21

## 2022-02-21 RX ORDER — PANTOPRAZOLE SODIUM 20 MG/1
40 TABLET, DELAYED RELEASE ORAL
Refills: 0 | Status: DISCONTINUED | OUTPATIENT
Start: 2022-02-21 | End: 2022-02-22

## 2022-02-21 RX ORDER — BUDESONIDE AND FORMOTEROL FUMARATE DIHYDRATE 160; 4.5 UG/1; UG/1
2 AEROSOL RESPIRATORY (INHALATION)
Refills: 0 | Status: DISCONTINUED | OUTPATIENT
Start: 2022-02-21 | End: 2022-02-22

## 2022-02-21 RX ORDER — LEVOTHYROXINE SODIUM 125 MCG
1 TABLET ORAL
Qty: 0 | Refills: 0 | DISCHARGE

## 2022-02-21 RX ORDER — ATORVASTATIN CALCIUM 80 MG/1
20 TABLET, FILM COATED ORAL AT BEDTIME
Refills: 0 | Status: DISCONTINUED | OUTPATIENT
Start: 2022-02-21 | End: 2022-02-22

## 2022-02-21 RX ORDER — ACETAMINOPHEN 500 MG
650 TABLET ORAL ONCE
Refills: 0 | Status: COMPLETED | OUTPATIENT
Start: 2022-02-21 | End: 2022-02-21

## 2022-02-21 RX ORDER — ATORVASTATIN CALCIUM 80 MG/1
40 TABLET, FILM COATED ORAL AT BEDTIME
Refills: 0 | Status: DISCONTINUED | OUTPATIENT
Start: 2022-02-21 | End: 2022-02-21

## 2022-02-21 RX ORDER — CHLORHEXIDINE GLUCONATE 213 G/1000ML
1 SOLUTION TOPICAL
Refills: 0 | Status: DISCONTINUED | OUTPATIENT
Start: 2022-02-21 | End: 2022-02-22

## 2022-02-21 RX ORDER — LEVOTHYROXINE SODIUM 125 MCG
100 TABLET ORAL DAILY
Refills: 0 | Status: DISCONTINUED | OUTPATIENT
Start: 2022-02-21 | End: 2022-02-22

## 2022-02-21 RX ORDER — HYDROXYCHLOROQUINE SULFATE 200 MG
1 TABLET ORAL
Qty: 0 | Refills: 0 | DISCHARGE

## 2022-02-21 RX ORDER — MONTELUKAST 4 MG/1
10 TABLET, CHEWABLE ORAL AT BEDTIME
Refills: 0 | Status: DISCONTINUED | OUTPATIENT
Start: 2022-02-21 | End: 2022-02-22

## 2022-02-21 RX ORDER — DULOXETINE HYDROCHLORIDE 30 MG/1
30 CAPSULE, DELAYED RELEASE ORAL DAILY
Refills: 0 | Status: DISCONTINUED | OUTPATIENT
Start: 2022-02-21 | End: 2022-02-21

## 2022-02-21 RX ORDER — ASPIRIN/CALCIUM CARB/MAGNESIUM 324 MG
81 TABLET ORAL DAILY
Refills: 0 | Status: DISCONTINUED | OUTPATIENT
Start: 2022-02-21 | End: 2022-02-22

## 2022-02-21 RX ORDER — DULOXETINE HYDROCHLORIDE 30 MG/1
90 CAPSULE, DELAYED RELEASE ORAL DAILY
Refills: 0 | Status: DISCONTINUED | OUTPATIENT
Start: 2022-02-21 | End: 2022-02-22

## 2022-02-21 RX ADMIN — Medication 650 MILLIGRAM(S): at 02:46

## 2022-02-21 RX ADMIN — Medication 650 MILLIGRAM(S): at 15:36

## 2022-02-21 RX ADMIN — Medication 100 MICROGRAM(S): at 05:05

## 2022-02-21 RX ADMIN — ATORVASTATIN CALCIUM 20 MILLIGRAM(S): 80 TABLET, FILM COATED ORAL at 22:31

## 2022-02-21 RX ADMIN — BUDESONIDE AND FORMOTEROL FUMARATE DIHYDRATE 2 PUFF(S): 160; 4.5 AEROSOL RESPIRATORY (INHALATION) at 08:34

## 2022-02-21 RX ADMIN — Medication 650 MILLIGRAM(S): at 16:00

## 2022-02-21 RX ADMIN — MONTELUKAST 10 MILLIGRAM(S): 4 TABLET, CHEWABLE ORAL at 22:31

## 2022-02-21 RX ADMIN — DULOXETINE HYDROCHLORIDE 90 MILLIGRAM(S): 30 CAPSULE, DELAYED RELEASE ORAL at 20:22

## 2022-02-21 RX ADMIN — Medication 650 MILLIGRAM(S): at 02:16

## 2022-02-21 RX ADMIN — PANTOPRAZOLE SODIUM 40 MILLIGRAM(S): 20 TABLET, DELAYED RELEASE ORAL at 05:05

## 2022-02-21 RX ADMIN — BUDESONIDE AND FORMOTEROL FUMARATE DIHYDRATE 2 PUFF(S): 160; 4.5 AEROSOL RESPIRATORY (INHALATION) at 20:22

## 2022-02-21 RX ADMIN — Medication 81 MILLIGRAM(S): at 12:36

## 2022-02-21 NOTE — SWALLOW BEDSIDE ASSESSMENT ADULT - SLP GENERAL OBSERVATIONS
Pt received in bed awake w/o any c/o pain, son at bedside, +room air, pt noted to have word finding difficulties and inconsistent disfluencies

## 2022-02-21 NOTE — PHYSICAL THERAPY INITIAL EVALUATION ADULT - PLANNED THERAPY INTERVENTIONS, PT EVAL
balance training/bed mobility training/gait training/motor coordination training/postural re-education/ROM/strengthening/stretching/transfer training

## 2022-02-21 NOTE — OCCUPATIONAL THERAPY INITIAL EVALUATION ADULT - FINE MOTOR COORDINATION, LEFT HAND, FINGER TO NOSE, OT EVAL
pt with mod/max impairment during formal testing. During observation pt able to use ue for dressing and support in transfers appropriately

## 2022-02-21 NOTE — SWALLOW BEDSIDE ASSESSMENT ADULT - SLP PERTINENT HISTORY OF CURRENT PROBLEM
Pt is a 62 y/o F w/ PMHx asthma, hypothyroidism ?SLE P. Pt admitted w/ dysarthria, L sided weakness and loss of sensation on the L side, dizzy, headache, chest tightness, inability to speak. CTH(-) and MRI (-). Pt complained changes since COVID in 2020. Could be complicated migraine Vs seizure.

## 2022-02-21 NOTE — H&P ADULT - NSHPPHYSICALEXAM_GEN_ALL_CORE
PHYSICAL EXAM:  GENERAL: NAD, AAO x 3, 61y F, dysarthric  HEAD:  Atraumatic, Normocephalic  EYES: EOMI, conjunctiva and sclera white  NECK: Supple, No JVD  CHEST/LUNG: Clear to auscultation bilaterally; No wheeze; No crackles; No accessory muscles used  HEART: Regular rate and rhythm; No murmurs;   ABDOMEN: Soft, Nontender, Nondistended; Bowel sounds present; No guarding, No organomegaly  EXTREMITIES:  2+ Peripheral Pulses, No cyanosis or edema  NEUROLOGY: LLE and LUE strength 4/5, decreased sensation on the right face, right UE and LE. + left facial droop

## 2022-02-21 NOTE — H&P ADULT - NSHPLABSRESULTS_GEN_ALL_CORE
12.4   7.93  )-----------( 350      ( 20 Feb 2022 23:40 )             37.7       02-20    145  |  109  |  15  ----------------------------<  140<H>  4.5   |  23  |  0.8    Ca    9.5      20 Feb 2022 23:40    TPro  7.3  /  Alb  4.6  /  TBili  0.4  /  DBili  x   /  AST  35  /  ALT  40  /  AlkPhos  125<H>  02-20                  PT/INR - ( 20 Feb 2022 23:40 )   PT: 11.30 sec;   INR: 0.98 ratio         PTT - ( 20 Feb 2022 23:40 )  PTT:35.8 sec    Lactate Trend      CARDIAC MARKERS ( 20 Feb 2022 23:40 )  x     / <0.01 ng/mL / x     / x     / x            CAPILLARY BLOOD GLUCOSE      POCT Blood Glucose.: 132 mg/dL (20 Feb 2022 22:13)    RADIOLOGY:  < from: CT Brain Stroke Protocol (02.20.22 @ 22:30) >    FINDINGS:    The ventricles and sulci are appropriate for patient age.    There are no extra-axial fluid collections or hydrocephalus. There is no   evidence of acute intracranial hemorrhage, mass effect, or midline shift.    The calvarium is intact. The paranasal sinuses and mastoid complexes are   clear.The visualized orbits are unremarkable.  Unchanged surgical   hardware of the lateral and inferior right orbit      IMPRESSION:      No evidence of acute intracranial pathology.      < end of copied text >

## 2022-02-21 NOTE — H&P ADULT - NSICDXPASTMEDICALHX_GEN_ALL_CORE_FT
PAST MEDICAL HISTORY:  Asthma     Fibromyalgia     Hypothyroidism     SLE (systemic lupus erythematosus)

## 2022-02-21 NOTE — SWALLOW BEDSIDE ASSESSMENT ADULT - NS ASR SWALLOW FINDINGS DISCUS
Procedure:  EGD    Relevant Problems   CARDIO   (+) Hypertension   (+) Musculoskeletal chest pain      GI/HEPATIC   (+) Dysphagia   (+) GERD (gastroesophageal reflux disease)      MUSCULOSKELETAL   (+) Thoracic myofascial strain      NEURO/PSYCH   (+) Major depressive disorder, single episode, mild (HCC)        Physical Exam    Airway      TM Distance: >3 FB  Neck ROM: full     Dental   No notable dental hx     Cardiovascular  Cardiovascular exam normal    Pulmonary  Pulmonary exam normal     Other Findings        Anesthesia Plan  ASA Score- 2     Anesthesia Type- IV sedation with anesthesia with ASA Monitors  Additional Monitors:   Airway Plan:           Plan Factors-    Chart reviewed  Patient summary reviewed  Induction- intravenous  Postoperative Plan-     Informed Consent- Anesthetic plan and risks discussed with patient  I personally reviewed this patient with the CRNA  Discussed and agreed on the Anesthesia Plan with the CRNA  Db Garcia PATTI Walker/Nursing

## 2022-02-21 NOTE — CONSULT NOTE ADULT - SUBJECTIVE AND OBJECTIVE BOX
CC:HPI:         HPI:  62 yo F with PMHx asthma, hypothyroidism, fibromyalgia, ?SLE P.W dysarthria and left sided weakness starting 1 hour prior to arrival to ED. Patient states, that she has been dizzy since past few days, but in the evening today at about 9 pm she was sitting and talking on the phone when she suddenly experienced dizziness and episode chest and neck tightness, associated with left sided weakness and loss of sensation on the left side. Patient also endorsed inability to speek + headache, + chest tightness, +nausea. Otherwise denies vision loss, vomiting, fall trauma, seizure like activity. Stroke code activated on arrival to ed, VS: /79  RR 20, T 97.3F, Spo2 96% on RA.        Home Medications:  Azelastine 137 mcg/inh (0.1%) nasal spray: 2 spray(s) nasal 2 times a day (21 Feb 2022 01:03)  Corlanor 5 mg oral tablet: 1 tab(s) orally 2 times a day (with meals) (21 Feb 2022 01:02)  DULoxetine 30 mg oral delayed release capsule: 1 tab(s) orally once a day (21 Feb 2022 01:04)  Montelukast: 50 milligram(s) orally once a day (21 Feb 2022 01:01)  Pantoprazole 40 mg oral delayed release tablet: 1 tab(s) orally once a day (21 Feb 2022 01:02)  Symbicort 80 mcg-4.5 mcg/inh inhalation aerosol: 2 puff(s) inhaled 2 times a day (21 Feb 2022 01:03)  Synthroid 100 mcg (0.1 mg) oral tablet: 1 tab(s) orally once a day (21 Feb 2022 01:02)      Social History  Denies smoking alcohol or drug use        Neuro Exam:  Orientation: Patient is awake alert, oriented x3. Normal attention and comprehension. Able to give history  Cranial Nerves: visual acuity intact bilaterally, visual fields full to confrontation, pupils equal round and reactive to light, extraocular motion intact, facial sensation intact symmetrically, face symmetrical, hearing was intact bilaterally, tongue and palate midline, head turning and shoulder shrug symmetric and there was no tongue deviation with protrusion.   Motor: RUE 5/5  RLE 5/5             LUE  4+/5  LLE 4+/5 (Left arm and leg drift)             Normal muscle tone and bulk  Sensory exam: intact and symmetric  Coordination:. no dysmetria or limb ataxia   Gait: unable at this time  No neglect      NIHSS: 3  LOC:       1a: 0    1b(Questions):   0        1c(Instructions): 0            Best Gaze: 0  Visual: 0  Motor:                 RUE: 0    RLE:  0   LUE: 1    LLE: 1     FACE: 0    Limb Ataxia: 0  Sensory:    1   Language:  0     Dysarthria: 0         Extinction and Inattention: 0        m [place] score (baseline 0, current score is 2)  0 No symptoms at all  1 No significant disability despite symptoms; able to carry out all usual duties and activities without assistance  2 Slight disability; unable to carry out all previous activities, but able to look after own affairs  3 Moderate disability; requiring some help, but able to walk without assistance  4 Moderately severe disability; unable to walk without assistance and unable to attend to own bodily needs without assistance  5 Severe disability; bedridden, incontinent and requiring constant nursing care and attention  6 Dead      Allergies    contrast media (iodine-based) (Anaphylaxis)    Intolerances      MEDICATIONS  (STANDING):  aspirin  chewable 81 milligram Oral daily  atorvastatin 40 milligrams Oral at bedtime  budesonide  80 Micrograms' 4.5 MICROgram(s) Inhaler 2 Puff(s) Inhalation two times a day  chlorhexidine 4% Liquid 1 Application(s) Topical <User Schedule>  Duloxetine 30 milligrams Oral daily  levothyroxine 100 Microgram's Oral daily  montelukast Oral Tab/Cap - Peds 10 milligram Oral at bedtime  pantoprazole    Tablet 40 milligram Oral before breakfast        MEDICATIONS  (PRN):      LABS:                        12.4   7.93  )-----------( 350      ( 20 Feb 2022 23:40 )             37.7     02-20    145  |  109  |  15  ----------------------------<  140<H>  4.5   |  23  |  0.8    Ca    9.5      20 Feb 2022 23:40    TPro  7.3  /  Alb  4.6  /  TBili  0.4  /  DBili  x   /  AST  35  /  ALT  40  /  AlkPhos  125<H>  02-20    PT/INR - ( 20 Feb 2022 23:40 )   PT: 11.30 sec;   INR: 0.98 ratio         PTT - ( 20 Feb 2022 23:40 )  PTT:35.8 sec        Neuro Imaging:  < from: CT Brain Stroke Protocol (02.20.22 @ 22:30) >    FINDINGS:    The ventricles and sulci are appropriate for patient age.    There are no extra-axial fluid collections or hydrocephalus. There is no   evidence of acute intracranial hemorrhage, mass effect, or midline shift.    The calvarium is intact. The paranasal sinuses and mastoid complexes are   clear.The visualized orbits are unremarkable.  Unchanged surgical   hardware of the lateral and inferior right orbit      IMPRESSION:      No evidence of acute intracranial pathology.    Findings discussed with Miguel A Soriano on 2/20/2022 at 10:41 PM.    --- End of Report ---    LING CUNHA MD; Resident Radiologist  This document has been electronically signed.  GABRIELLE MATIAS MD; Attending Radiologist  This document has been electronically signed. Feb 20 2022 10:55PM    < end of copied text >        < from: MR Head No Cont (02.20.22 @ 23:18) >  FINDINGS:    The ventricles and sulci are appropriate for patient age.    There are no extra-axial fluid collections or hydrocephalus. There is no   evidence of acute infarct, intracranial hemorrhage, mass effect, or   midline shift.    Prominent perivascular space in the right basal ganglia.    The visualized portions of the major intracranial flow voids are   preserved.    No evidence of signal abnormality within the calvarium. The paranasal   sinuses and mastoid complexes are clear. Metallic susceptibility artifact   over the right orbit.      IMPRESSION:    No evidence of acute intracranial pathology.        ******PRELIMINARY REPORT******      ******PRELIMINARY REPORT******       LING CUNHA MD; Resident Radiologist    < end of copied text >    EEG:     Echo:   Carotid Doppler: N/A  Telemetry:

## 2022-02-21 NOTE — PHYSICAL THERAPY INITIAL EVALUATION ADULT - GAIT DEVIATIONS NOTED, PT EVAL
decreased alban/increased time in double stance/decreased velocity of limb motion/decreased step length

## 2022-02-21 NOTE — OCCUPATIONAL THERAPY INITIAL EVALUATION ADULT - PERTINENT HX OF CURRENT PROBLEM, REHAB EVAL
60 yo F w/ PMHx asthma, hypothyroidism ?SLE P.W dysarthria and left sided weakness starting 1 hour prior to arrival to ED. Patient states, that she has been dizzy and having a headache since past few days, but at 9 pm she suddenly experienced chest and neck tightness, associated with left sided weakness and loss of sensation on the left side. Patient also endorsed inability to speak + headache, + chest tightness.

## 2022-02-21 NOTE — H&P ADULT - NSICDXPASTSURGICALHX_GEN_ALL_CORE_FT
PAST SURGICAL HISTORY:  H/O repair of right rotator cuff     History of repair of fracture of facial bone

## 2022-02-21 NOTE — H&P ADULT - HISTORY OF PRESENT ILLNESS
Patient is 61 years old female with PMHx asthma, hypothyroidism, fibromyalgia, SLE (questionable, as per pt, it was ruled out) presents to ED with stroke-like symptoms including dysarthria and left sided facial droop and left sided weakness starting 1 hour prior to arrival to ED. Patient states, that yesterday morning she has noted short episode of left neck tightness and jaw pain, which quickly resolved, but in the evening at about 9 pm she was sitting and talking on the phone when she suddenly experience lightheadedness and dizziness and episode chest and neck tightness, associated with left sided weakness and loss of sensation on the left side. Patient also endorsed inability to speck during event. + headache, + chest tightness, +nausea. Otherwise denies vision loss, abdominal pain,  vomiting/ diarrhea, constipation, dysuria recent illness.    Upon arrival to ED, patient VS showed /79  RR 20, T 97.3F, Spo2 96% on RA.  In ED patient had CT head non con was done, which showed no acute intracranial pathology( Contrast was not administered as patient allergic to contrast). Decision was made to administer TPA as patient was within the window for TPA administration,  however patient reported hemorrhoidal bleeding, thus TPA was cancelled.   Pt was admitted for stroke work up.

## 2022-02-21 NOTE — PHYSICAL THERAPY INITIAL EVALUATION ADULT - ADDITIONAL COMMENTS
Lives in private home with  and son, 10? stairs to enter, full flight to bedroom with railing, bath on 1 and 2, previously independent without device, +

## 2022-02-21 NOTE — OCCUPATIONAL THERAPY INITIAL EVALUATION ADULT - PHYSICAL ASSIST/NONPHYSICAL ASSIST:DRESS UPPER BODY, OT EVAL
Weight Change:Unable to properly assess wt hx none on file   · Ideal Body Wt: 105 lb (47.6 kg), % Ideal Body 92%  · BMI Classification: BMI 18.5 - 24.9 Normal Weight    Nutrition Interventions:   Continued Inpatient Monitoring, Education not appropriate at this time    Nutrition Evaluation:   · Evaluation: Goals set   · Goals: Nutrition progression when medically appropriate    · Monitoring: Nutrition Progression, Skin Integrity, I&O, Mental Status/Confusion, Weight, Pertinent Labs, Monitor Bowel Function, Monitor Hemodynamic Status      Electronically signed by Meenu Mills RD, LD on 3/17/20 at 12:48 PM EDT    Contact Number: [phone] [phone] supervision

## 2022-02-21 NOTE — STROKE CODE NOTE - ASSESSMENT/PLAN
Pt is a 62 yo F with PMHx of lupus, hypothyroidism, who presents with speech difficulty and left sided weakness. LKW 2130. NIHSS 3. Pt has some hesitant speech with stuttering, no aphasia appreciated (fluent, naming, reading, comprehension, repeating intact), fluctuating/distractible left arm and leg weakness sparing the face. CT head without acute process. Pt not a tPA candidate as she endorse blood in her stool 1 day ago. Reported anaphylactic allergy to iodine contrast thus stroke code MRI/MRA obtained. No acute ischemic stroke appreciated. MRA not completed? granted given that this is not a stroke, do not need to proceed with completing stroke workup. Possible complex migraine as pt also endorsed headache. Dispo as per ED physician.

## 2022-02-21 NOTE — PHYSICAL THERAPY INITIAL EVALUATION ADULT - GENERAL OBSERVATIONS, REHAB EVAL
Chart reviewed. Pt. in bed upon PT arrival with son present at bedside. No c/o pain, agreeable to PT eval.

## 2022-02-21 NOTE — CONSULT NOTE ADULT - ASSESSMENT
60 yo F w/ PMHx asthma, hypothyroidism ?SLE P.W dysarthria and left sided weakness starting 1 hour prior to arrival to ED. Patient states, that she has been dizzy and having a headache since past few days, but at 9 pm she suddenly experienced chest and neck tightness, associated with left sided weakness and loss of sensation on the left side. Patient also endorsed inability to speak + headache, + chest tightness.  Stroke code activated on arrival to ed, VS: /79  RR 20, T 97.3F, Spo2 96% on RA. Case was discussed with CTS attending on call Dr Ermias Mensah. CTH negative for acute intracranial pathology. Patient was not a candidate for IV thrombolysis since patient reported hemorrhoidal bleeding (most recently a day prior to presentation). Unable to obtain brain vascular imaging since patient has h/o severe anaphylaxis to contrast dye thus a stat MRI stroke protocol was completed which was negative for acute cva.     #Left sided weakness no stroke on MRI could be complicated migraine Vs seizure      Plan  -Admit to medicine  -Obtain REEG  -Lipid profile, hbaic TSH, Cardiac enzymes, EKG  -B/L Carotid duplex   -Neuro attending note will follow

## 2022-02-21 NOTE — PHYSICAL THERAPY INITIAL EVALUATION ADULT - TINETTI GAIT TEST, REHAB EVAL
Based on this exam, patient is a high risk to fall. Pt. was educated to not perform mobility, including to bathroom, without staff assistance due to high risk to fall.

## 2022-02-21 NOTE — H&P ADULT - ASSESSMENT
Patient is 61 years old female with PMHx asthma, hypothyroidism, fibromyalgia, SLE (questionable, as per pt, it was ruled out) presents to ED with stroke-like symptoms including dysarthria and left sided facial droop and left sided weakness starting 1 hour prior to arrival to ED.     # Left-sided weakness and dysarthria due to acute CVA  + loss of sensation on left face, left UE and LE.  + dysarthria, + headache, + chest tightness  -  denies visual changes  - In the ED, STROKE CODE  for Dysarthria + Left-sided weakness.  - CTH was negative for acute changes.   - No tPA administered due to hemorrhoidal bleeding.   - Neuro consulted and recommended the pt be admitted to telemetry,  - troponin negative x1, f/u trops  - f/u MRI brain  - f/u Echo,   - f/u carotid duplex  - secondary stroke PPX (ASA/Statin)  - f/u A1c, lipids, and TSH. Folate/B12.  - c/w Neurochecks.   - PT/OT/speech consult. Fall precautions.  - f/u Neurology     # hypothyroidism  - c/w levothyroxine 100 mcg  - f/u TSH    #asthma  - c/w montelukast 50 mg QD  - c/w Symbicort    # fibromyalgia  - c/w duloxetine 30 mg QD    Diet: NPO until speech and swallow  Activity: as tolerated, PT consult  DVT Prophylaxis:   GI Prophylaxis: pantoprazole  CHG Order  Code Status: Full code  Disposition: tele monitoring

## 2022-02-21 NOTE — CONSULT NOTE ADULT - ATTENDING COMMENTS
I have personally seen and examined this patient on 2/21. Acute stroke code was managed by CTS attending, I have fully participated in the care of this patient.  I have reviewed all pertinent clinical information, including history, physical exam, plan and note.  61 year old woman with medical history of asthma and hypothyroidism presented with worsening dysarthria, chest and left neck tightness and numbness and subjective weakness of the left side. . Stroke code was called and Stat Brain MRI showed no acute infarct. Patient has been following with neurology office for cognitive impairment, dysarthria and tremor since March 2021. Outpatient MRI was unremarkable. Currently on exam, cranial nerves are intact, motors are 5/5 with some poor effort. Differentials include organic etiology like complex migraine and post ictal are. Routine EEG is being done. Other possibility are cervical radiculopathy and anxiety attack. Check orthostatics. If EEG negative, could be discharged and follow up with neurology.  I have reviewed all pertinent clinical information and reviewed all relevant imaging and diagnostic studies personally.  Recommendations as above.  Agree with above assessment except as noted.

## 2022-02-21 NOTE — OCCUPATIONAL THERAPY INITIAL EVALUATION ADULT - IMPAIRED TRANSFERS: SIT/STAND, REHAB EVAL
pt reported dizziness and noted to be swaying. Pt instructed to get up with assistance only. Son present and in agreement/impaired balance

## 2022-02-21 NOTE — H&P ADULT - ATTENDING COMMENTS
HPI:  Patient is 61 years old female with PMHx asthma, hypothyroidism, fibromyalgia, SLE (questionable, as per pt, it was ruled out) presents to ED with stroke-like symptoms including dysarthria and left sided facial droop and left sided weakness starting 1 hour prior to arrival to ED. Patient states, that yesterday morning she has noted short episode of left neck tightness and jaw pain, which quickly resolved, but in the evening at about 9 pm she was sitting and talking on the phone when she suddenly experience lightheadedness and dizziness and episode chest and neck tightness, associated with left sided weakness and loss of sensation on the left side. Patient also endorsed inability to speck during event. + headache, + chest tightness, +nausea. Otherwise denies vision loss, abdominal pain,  vomiting/ diarrhea, constipation, dysuria recent illness.    Upon arrival to ED, patient VS showed /79  RR 20, T 97.3F, Spo2 96% on RA.  In ED patient had CT head non con was done, which showed no acute intracranial pathology( Contrast was not administered as patient allergic to contrast). Decision was made to administer TPA as patient was within the window for TPA administration,  however patient reported hemorrhoidal bleeding, thus TPA was cancelled.   Pt was admitted for stroke work up. (21 Feb 2022 00:57)    REVIEW OF SYSTEMS: see cc/HPI   CONSTITUTIONAL: No weakness, fevers or chills  EYES/ENT: No visual changes;  No vertigo or throat pain   NECK: No pain or stiffness  RESPIRATORY: No cough, wheezing, hemoptysis; No shortness of breath  CARDIOVASCULAR: No chest pain or palpitations  GASTROINTESTINAL: No abdominal or epigastric pain. No nausea, vomiting, or hematemesis; No diarrhea or constipation. No melena or hematochezia.  GENITOURINARY: No dysuria, frequency or hematuria  NEUROLOGICAL: No numbness or weakness  SKIN: No itching, rashes    Physical Exam:  General: WN/WD NAD  Neurology: A&Ox3, nonfocal, follows commands  Eyes: PERRLA/ EOMI  ENT/Neck: Neck supple, trachea midline, No JVD  Respiratory: CTA B/L, No wheezing, rales, rhonchi  CV: Normal rate regular rhythm, S1S2, no murmurs, rubs or gallops  Abdominal: Soft, NT, ND +BS,   Extremities: No edema, + peripheral pulses  Skin: No Rashes, Hematoma, Ecchymosis  Incisions:   Tubes: HPI:  Patient is 61 years old female with PMHx asthma, hypothyroidism, fibromyalgia, SLE (questionable, as per pt, it was ruled out) presents to ED with stroke-like symptoms including dysarthria and left sided facial droop and left sided weakness starting 1 hour prior to arrival to ED. Patient states, that yesterday morning she has noted short episode of left neck tightness and jaw pain, which quickly resolved, but in the evening at about 9 pm she was sitting and talking on the phone when she suddenly experience lightheadedness and dizziness and episode chest and neck tightness, associated with left sided weakness and loss of sensation on the left side. Patient also endorsed inability to speck during event. + headache, + chest tightness, +nausea. Otherwise denies vision loss, abdominal pain,  vomiting/ diarrhea, constipation, dysuria recent illness.    Upon arrival to ED, patient VS showed /79  RR 20, T 97.3F, Spo2 96% on RA.  In ED patient had CT head non con was done, which showed no acute intracranial pathology( Contrast was not administered as patient allergic to contrast). Decision was made to administer TPA as patient was within the window for TPA administration,  however patient reported hemorrhoidal bleeding, thus TPA was cancelled.   Pt was admitted for stroke work up. (21 Feb 2022 00:57)    REVIEW OF SYSTEMS: see cc/HPI   CONSTITUTIONAL: No weakness, fevers or chills  EYES/ENT: No visual changes;  No vertigo or throat pain   NECK: No pain or stiffness  RESPIRATORY: No cough, wheezing, hemoptysis; No shortness of breath  CARDIOVASCULAR: No chest pain or palpitations  GASTROINTESTINAL: No abdominal or epigastric pain. No nausea, vomiting, or hematemesis; No diarrhea or constipation. No melena or hematochezia.  GENITOURINARY: No dysuria, frequency or hematuria  NEUROLOGICAL: L facial (+) numbness (+) weakness, see cc/HPI, symptoms > 24 hours, initially intermittent and short ---> persistent prior to presenting to the ED  Also c/w tremors for > 1 yr, that started after being ill w/ COVID  SKIN: No itching, rashes    Physical Exam:  General: WN/WD NAD  Neurology: A&Ox3, follows commands, (+) diff with word fluency ( ??memory loss), dysarthria,  L facial droop, LUE 4+/5 strength , bilateral UE tremors worse w/ movement   Eyes: PERRLA/ EOMI  ENT/Neck: Neck supple, trachea midline, No JVD  Respiratory: CTA B/L, No wheezing, rales, rhonchi  CV: Normal rate regular rhythm, S1S2, no murmurs, rubs or gallops  Abdominal: Soft, NT, ND +BS,   Extremities: No edema, + peripheral pulses  Skin: No Rashes, Hematoma, Ecchymosis  Incisions:   Tubes:    A/p  CVA   -admit to tele   -follow up MRI for brain   -2D echo , fasting lipids, A1c, TSH, Vit B12  -PT/Rehab and speech eval   -Fall precautions and NPO for now  -Neurology follow up     Tremors - denies alcohol use disorder - r/o movement disorder r/o thyroid excess  -check TSH   -REEG     Hypothyroidism   -c/w Levothyroxine     Fibromyalgia   -Cymbalta     Asthma   -Singulair and Symbicort   -may benefit from Xopenex PRN for asthma - given tremors     DVT prophylaxis

## 2022-02-21 NOTE — CHART NOTE - NSCHARTNOTEFT_GEN_A_CORE
Patient seen and examined.   Patient continues to report left sided weakness and paresthesia/hypoesthesias.   MRI negative for CVA.  Possible complex migraine vs Dashawn's paralysis. F/u EEG. Neuro f/u.   Rest of plan as H&P.

## 2022-02-21 NOTE — PHYSICAL THERAPY INITIAL EVALUATION ADULT - GAIT DISTANCE, PT EVAL
2 steps; pt. reporting lighteadedness upon standing, after <1min cont to demonstrate symptoms, returned to sitting EOB, /79, after 1min reported some improvement; upon standing again reported lighteadedness with postural sway; at which time returned to supine, MD and RN notified.

## 2022-02-21 NOTE — PATIENT PROFILE ADULT - MEDICATIONS/VISITS
Problem: Fall Risk (Adult)  Goal: Identify Related Risk Factors and Signs and Symptoms  Outcome: Outcome(s) achieved Date Met: 05/11/19    Goal: Absence of Fall  Outcome: Ongoing (interventions implemented as appropriate)      Problem: Patient Care Overview  Goal: Plan of Care Review  Outcome: Ongoing (interventions implemented as appropriate)   05/11/19 0354   Coping/Psychosocial   Plan of Care Reviewed With patient   Plan of Care Review   Progress no change   OTHER   Outcome Summary No complaints of pain, no acute changes this shift. Will continue to monitor.       Problem: Skin Injury Risk (Adult)  Goal: Skin Health and Integrity  Outcome: Ongoing (interventions implemented as appropriate)      Problem: Dying Patient, Actively (Adult)  Goal: Comfort/Pain Control  Outcome: Ongoing (interventions implemented as appropriate)    Goal: Peace/Preservation of Dignity During the Dying Process  Outcome: Ongoing (interventions implemented as appropriate)         no

## 2022-02-21 NOTE — PATIENT PROFILE ADULT - FALL HARM RISK - HARM RISK INTERVENTIONS

## 2022-02-22 ENCOUNTER — TRANSCRIPTION ENCOUNTER (OUTPATIENT)
Age: 62
End: 2022-02-22

## 2022-02-22 VITALS
SYSTOLIC BLOOD PRESSURE: 151 MMHG | TEMPERATURE: 96 F | DIASTOLIC BLOOD PRESSURE: 79 MMHG | HEART RATE: 77 BPM | RESPIRATION RATE: 18 BRPM

## 2022-02-22 LAB
ALBUMIN SERPL ELPH-MCNC: 4.4 G/DL — SIGNIFICANT CHANGE UP (ref 3.5–5.2)
ALP SERPL-CCNC: 101 U/L — SIGNIFICANT CHANGE UP (ref 30–115)
ALT FLD-CCNC: 35 U/L — SIGNIFICANT CHANGE UP (ref 0–41)
ANION GAP SERPL CALC-SCNC: 8 MMOL/L — SIGNIFICANT CHANGE UP (ref 7–14)
AST SERPL-CCNC: 30 U/L — SIGNIFICANT CHANGE UP (ref 0–41)
BASOPHILS # BLD AUTO: 0.05 K/UL — SIGNIFICANT CHANGE UP (ref 0–0.2)
BASOPHILS NFR BLD AUTO: 0.9 % — SIGNIFICANT CHANGE UP (ref 0–1)
BILIRUB SERPL-MCNC: 0.7 MG/DL — SIGNIFICANT CHANGE UP (ref 0.2–1.2)
BUN SERPL-MCNC: 13 MG/DL — SIGNIFICANT CHANGE UP (ref 10–20)
CALCIUM SERPL-MCNC: 9.6 MG/DL — SIGNIFICANT CHANGE UP (ref 8.5–10.1)
CHLORIDE SERPL-SCNC: 109 MMOL/L — SIGNIFICANT CHANGE UP (ref 98–110)
CO2 SERPL-SCNC: 26 MMOL/L — SIGNIFICANT CHANGE UP (ref 17–32)
CREAT SERPL-MCNC: 0.7 MG/DL — SIGNIFICANT CHANGE UP (ref 0.7–1.5)
EOSINOPHIL # BLD AUTO: 0.18 K/UL — SIGNIFICANT CHANGE UP (ref 0–0.7)
EOSINOPHIL NFR BLD AUTO: 3.2 % — SIGNIFICANT CHANGE UP (ref 0–8)
GLUCOSE SERPL-MCNC: 97 MG/DL — SIGNIFICANT CHANGE UP (ref 70–99)
HCT VFR BLD CALC: 39.1 % — SIGNIFICANT CHANGE UP (ref 37–47)
HGB BLD-MCNC: 12.8 G/DL — SIGNIFICANT CHANGE UP (ref 12–16)
IMM GRANULOCYTES NFR BLD AUTO: 0.2 % — SIGNIFICANT CHANGE UP (ref 0.1–0.3)
LYMPHOCYTES # BLD AUTO: 1.98 K/UL — SIGNIFICANT CHANGE UP (ref 1.2–3.4)
LYMPHOCYTES # BLD AUTO: 35 % — SIGNIFICANT CHANGE UP (ref 20.5–51.1)
MAGNESIUM SERPL-MCNC: 2.2 MG/DL — SIGNIFICANT CHANGE UP (ref 1.8–2.4)
MCHC RBC-ENTMCNC: 31.5 PG — HIGH (ref 27–31)
MCHC RBC-ENTMCNC: 32.7 G/DL — SIGNIFICANT CHANGE UP (ref 32–37)
MCV RBC AUTO: 96.3 FL — SIGNIFICANT CHANGE UP (ref 81–99)
MONOCYTES # BLD AUTO: 0.64 K/UL — HIGH (ref 0.1–0.6)
MONOCYTES NFR BLD AUTO: 11.3 % — HIGH (ref 1.7–9.3)
NEUTROPHILS # BLD AUTO: 2.79 K/UL — SIGNIFICANT CHANGE UP (ref 1.4–6.5)
NEUTROPHILS NFR BLD AUTO: 49.4 % — SIGNIFICANT CHANGE UP (ref 42.2–75.2)
NRBC # BLD: 0 /100 WBCS — SIGNIFICANT CHANGE UP (ref 0–0)
PLATELET # BLD AUTO: 315 K/UL — SIGNIFICANT CHANGE UP (ref 130–400)
POTASSIUM SERPL-MCNC: 5.3 MMOL/L — HIGH (ref 3.5–5)
POTASSIUM SERPL-SCNC: 5.3 MMOL/L — HIGH (ref 3.5–5)
PROT SERPL-MCNC: 6.6 G/DL — SIGNIFICANT CHANGE UP (ref 6–8)
RBC # BLD: 4.06 M/UL — LOW (ref 4.2–5.4)
RBC # FLD: 11.8 % — SIGNIFICANT CHANGE UP (ref 11.5–14.5)
SODIUM SERPL-SCNC: 143 MMOL/L — SIGNIFICANT CHANGE UP (ref 135–146)
WBC # BLD: 5.65 K/UL — SIGNIFICANT CHANGE UP (ref 4.8–10.8)
WBC # FLD AUTO: 5.65 K/UL — SIGNIFICANT CHANGE UP (ref 4.8–10.8)

## 2022-02-22 PROCEDURE — 93306 TTE W/DOPPLER COMPLETE: CPT | Mod: 26

## 2022-02-22 PROCEDURE — 95816 EEG AWAKE AND DROWSY: CPT | Mod: 26

## 2022-02-22 PROCEDURE — 99239 HOSP IP/OBS DSCHRG MGMT >30: CPT

## 2022-02-22 RX ORDER — LEVOTHYROXINE SODIUM 125 MCG
1 TABLET ORAL
Qty: 0 | Refills: 0 | DISCHARGE

## 2022-02-22 RX ORDER — DEXTROSE 50 % IN WATER 50 %
50 SYRINGE (ML) INTRAVENOUS ONCE
Refills: 0 | Status: DISCONTINUED | OUTPATIENT
Start: 2022-02-22 | End: 2022-02-22

## 2022-02-22 RX ORDER — LEVOTHYROXINE SODIUM 125 MCG
75 TABLET ORAL DAILY
Refills: 0 | Status: DISCONTINUED | OUTPATIENT
Start: 2022-02-23 | End: 2022-02-22

## 2022-02-22 RX ORDER — INSULIN HUMAN 100 [IU]/ML
10 INJECTION, SOLUTION SUBCUTANEOUS ONCE
Refills: 0 | Status: DISCONTINUED | OUTPATIENT
Start: 2022-02-22 | End: 2022-02-22

## 2022-02-22 RX ORDER — LEVOTHYROXINE SODIUM 125 MCG
1 TABLET ORAL
Qty: 30 | Refills: 3
Start: 2022-02-22 | End: 2022-06-21

## 2022-02-22 RX ORDER — ATORVASTATIN CALCIUM 80 MG/1
1 TABLET, FILM COATED ORAL
Qty: 30 | Refills: 0
Start: 2022-02-22 | End: 2022-03-23

## 2022-02-22 RX ADMIN — Medication 100 MICROGRAM(S): at 06:09

## 2022-02-22 RX ADMIN — Medication 81 MILLIGRAM(S): at 12:18

## 2022-02-22 RX ADMIN — BUDESONIDE AND FORMOTEROL FUMARATE DIHYDRATE 2 PUFF(S): 160; 4.5 AEROSOL RESPIRATORY (INHALATION) at 08:56

## 2022-02-22 NOTE — DISCHARGE NOTE PROVIDER - HOSPITAL COURSE
Patient is 61 years old female with PMHx asthma, hypothyroidism, fibromyalgia, SLE (questionable, as per pt, it was ruled out) presents to ED with stroke-like symptoms including dysarthria and left sided facial droop and left sided weakness starting 1 hour prior to arrival to ED. Patient states, that yesterday morning she has noted short episode of left neck tightness and jaw pain, which quickly resolved, but in the evening at about 9 pm she was sitting and talking on the phone when she suddenly experience lightheadedness and dizziness and episode chest and neck tightness, associated with left sided weakness and loss of sensation on the left side. Patient also endorsed inability to speck during event. + headache, + chest tightness, +nausea. Otherwise denies vision loss, abdominal pain,  vomiting/ diarrhea, constipation, dysuria recent illness.  Upon arrival to ED, patient VS showed /79  RR 20, T 97.3F, Spo2 96% on RA.  In ED patient had CT head non con was done, which showed no acute intracranial pathology( Contrast was not administered as patient allergic to contrast). Decision was made to administer TPA as patient was within the window for TPA administration,  however patient reported hemorrhoidal bleeding, thus TPA was cancelled.   Pt was admitted for stroke work up.    MRI neg for stroke, carotid doppler neg (mild stenosis), echo pending, stable for d/c

## 2022-02-22 NOTE — DISCHARGE NOTE NURSING/CASE MANAGEMENT/SOCIAL WORK - NSDCPEFALRISK_GEN_ALL_CORE
For information on Fall & Injury Prevention, visit: [email or web address] OR  [email or web address] OR  https://www.cdc.gov/steadi/patient.html

## 2022-02-22 NOTE — PROGRESS NOTE ADULT - ASSESSMENT
Patient is 61 years old female with PMHx asthma, hypothyroidism, fibromyalgia, SLE (questionable, as per pt, it was ruled out) presents to ED with stroke-like symptoms including dysarthria and left sided facial droop and left sided weakness starting 1 hour prior to arrival to ED.  Patient is 61 years old female with PMHx asthma, hypothyroidism, fibromyalgia, SLE (questionable, as per pt, it was ruled out) presents to ED with stroke-like symptoms including dysarthria and left sided facial droop and left sided weakness starting 1 hour prior to arrival to ED.     Stroke like symptoms likely due to Anxiety disorder  Brain Fog likely due Covid-19 infection last year  H/O Asthma  Fibromyalgia  Hypothyroidism               PLAN:    ·	Brain MRI reviewed. No evidence of acute stroke.   Subcortical white matter FLAIR hyperintensities  ·	Routine EEG reviewed. Focal slowing.   ·	CD showed 20-39% B/L stenosis  ·	F/U with the Neuro as and out pt  ·	Cont her home meds  ·	D/C home  ·	TSH is 0.14. Decrease Levothyroxine dose to 75 mcg po daily  ·	PT eval    * Med rec reviewed. Plan of care d/w the pt. Time spent 40 minutes.

## 2022-02-22 NOTE — DISCHARGE NOTE NURSING/CASE MANAGEMENT/SOCIAL WORK - PATIENT PORTAL LINK FT
You can access the FollowMyHealth Patient Portal offered by [facility] by registering at the following website: [email or web address]. By joining [employer]’s FollowMyHealth portal, you will also be able to view your health information using other applications (apps) compatible with our system.

## 2022-02-22 NOTE — DISCHARGE NOTE PROVIDER - CARE PROVIDER_API CALL
Abimael Esposito (DO)  Infectious Disease; Internal Medicine  [address] [place] [address]  Phone: [phone]  Fax: [phone]  Follow Up Time: 1 week

## 2022-02-22 NOTE — DISCHARGE NOTE PROVIDER - NSDCMRMEDTOKEN_GEN_ALL_CORE_FT
azelastine 137 mcg/inh (0.1%) nasal spray: 2 spray(s) nasal 2 times a day  Corlanor 5 mg oral tablet: 1 tab(s) orally 2 times a day (with meals)  DULoxetine 30 mg oral delayed release capsule: 1 tab(s) orally once a day  montelukast: 50 milligram(s) orally once a day  pantoprazole 40 mg oral delayed release tablet: 1 tab(s) orally once a day  Symbicort 80 mcg-4.5 mcg/inh inhalation aerosol: 2 puff(s) inhaled 2 times a day  Synthroid 100 mcg (0.1 mg) oral tablet: 1 tab(s) orally once a day   atorvastatin 20 mg oral tablet: 1 tab(s) orally once a day (at bedtime)  azelastine 137 mcg/inh (0.1%) nasal spray: 2 spray(s) nasal 2 times a day  Corlanor 5 mg oral tablet: 1 tab(s) orally 2 times a day (with meals)  DULoxetine 30 mg oral delayed release capsule: 1 tab(s) orally once a day  levothyroxine 75 mcg (0.075 mg) oral capsule: 1 cap(s) orally once a day   montelukast: 50 milligram(s) orally once a day  pantoprazole 40 mg oral delayed release tablet: 1 tab(s) orally once a day  Symbicort 80 mcg-4.5 mcg/inh inhalation aerosol: 2 puff(s) inhaled 2 times a day

## 2022-02-22 NOTE — CHART NOTE - NSCHARTNOTEFT_GEN_A_CORE
<<<RESIDENT DISCHARGE NOTE>>>     AMI SWIFT  MRN-[id number]    VITAL SIGNS:  T(F): 96.3 (02-22-22 @ 13:33), Max: 97.4 (02-21-22 @ 20:24)  HR: 77 (02-22-22 @ 13:33)  BP: 151/79 (02-22-22 @ 13:33)  SpO2: 98% (02-22-22 @ 08:48)      PHYSICAL EXAMINATION:  GENERAL: NAD, lying in bed comfortably  HEAD:  Atraumatic, Normocephalic  CHEST/LUNG: cta b/l . Unlabored respirations  HEART: Regular rate and rhythm; No murmurs, rubs, or gallops  ABDOMEN: Soft, nontender, nondistended  EXTREMITIES:  No clubbing, cyanosis, or edema  NERVOUS SYSTEM:  A&Ox3    TEST RESULTS:                        12.8   5.65  )-----------( 315      ( 22 Feb 2022 05:30 )             39.1       02-22    143  |  109  |  13  ----------------------------<  97  5.3<H>   |  26  |  0.7    Ca    9.6      22 Feb 2022 05:30  Mg     2.2     02-22    TPro  6.6  /  Alb  4.4  /  TBili  0.7  /  DBili  x   /  AST  30  /  ALT  35  /  AlkPhos  101  02-22    No events overnight, pt seen and examined, no complaints, stable for d/c.     FINAL DISCHARGE INTERVIEW:  Resident(s) Present: (Name: Shayan Salmeron MD)    DISCHARGE MEDICATION RECONCILIATION  reviewed with Attending (Name:__Dr. Mccray _________)    DISPOSITION:   [ x ] Home,    [  ] Home with Visiting Nursing Services,   [    ]  SNF/ NH,    [   ] Acute Rehab (4A),   [   ] Other (Specify:_________)

## 2022-02-22 NOTE — DISCHARGE NOTE PROVIDER - NSFOLLOWUPCLINICS_GEN_ALL_ED_FT
[facility]  Neurology  [address] [place] [address], [address]  [place] [address]  Phone: [phone]  Fax:

## 2022-02-22 NOTE — PROGRESS NOTE ADULT - SUBJECTIVE AND OBJECTIVE BOX
AMI SWIFT  61y Female    CHIEF COMPLAINT:    Patient is a 61y old  Female who presents with a chief complaint of stroke (2022 04:08)      INTERVAL HPI/OVERNIGHT EVENTS:    Patient seen and examined.    ROS: All other systems are negative.    Vital Signs:    T(F): 96.8 (22 @ 04:45), Max: 97.8 (22 @ 14:32)  HR: 75 (22 @ 04:45) (75 - 88)  BP: 127/61 (22 @ 04:45) (127/61 - 156/59)  RR: 18 (22 @ 04:45) (18 - 18)  SpO2: --  I&O's Summary    2022 07:01  -  2022 07:00  --------------------------------------------------------  IN: 773 mL / OUT: 200 mL / NET: 573 mL      Daily     Daily Weight in k.8 (2022 04:45)  CAPILLARY BLOOD GLUCOSE          PHYSICAL EXAM:    GENERAL:  NAD  SKIN: No rashes or lesions  HENT: Atraumatic. Normocephalic. PERRL. Moist membranes.  NECK: Supple, No JVD. No lymphadenopathy.  PULMONARY: CTA B/L. No wheezing. No rales  CVS: Normal S1, S2. Rate and Rhythm are regular. No murmurs.  ABDOMEN/GI: Soft, Nontender, Nondistended; BS present  EXTREMITIES: Peripheral pulses intact. No edema B/L LE.  NEUROLOGIC:  No motor or sensory deficit.  PSYCH: Alert & oriented x 3    Consultant(s) Notes Reviewed:  [x ] YES  [ ] NO  Care Discussed with Consultants/Other Providers [ x] YES  [ ] NO    EKG reviewed  Telemetry reviewed    LABS:                        12.8   5.65  )-----------( 315      ( 2022 05:30 )             39.1         143  |  109  |  13  ----------------------------<  97  5.3<H>   |  26  |  0.7    Ca    9.6      2022 05:30  Mg     2.2         TPro  6.6  /  Alb  4.4  /  TBili  0.7  /  DBili  x   /  AST  30  /  ALT  35  /  AlkPhos  101      PT/INR - ( 2022 23:40 )   PT: 11.30 sec;   INR: 0.98 ratio         PTT - ( 2022 23:40 )  PTT:35.8 sec    Trop <0.01, CKMB --, CK --, 22 @ 04:30  Trop <0.01, CKMB --, CK --, 22 @ 23:40        RADIOLOGY & ADDITIONAL TESTS:    < from: MR Head No Cont (22 @ 23:18) >    IMPRESSION:    1.  No MRI evidence to suggest acute ischemic change.    2.  Subcortical white matter FLAIR hyperintensities, nonspecific,   differential diagnostic possibilities include chronic ischemic change,   gliosis or demyelination.    < end of copied text >  < from: VA Duplex Carotid, Bilat (22 @ 19:12) >  IMPRESSION: Mild 20-39% stenosis in bilateral internal carotid arteries    < end of copied text >    Imaging or report Personally Reviewed:  [ ] YES  [ ] NO    Medications:  Standing  aspirin  chewable 81 milliGRAM(s) Oral daily  atorvastatin 20 milliGRAM(s) Oral at bedtime  budesonide  80 MICROgram(s)/formoterol 4.5 MICROgram(s) Inhaler 2 Puff(s) Inhalation two times a day  chlorhexidine 4% Liquid 1 Application(s) Topical <User Schedule>  DULoxetine 90 milliGRAM(s) Oral daily  levothyroxine 100 MICROGram(s) Oral daily  montelukast Oral Tab/Cap - Peds 10 milliGRAM(s) Oral at bedtime  pantoprazole    Tablet 40 milliGRAM(s) Oral before breakfast    PRN Meds      Case discussed with resident    Care discussed with pt/family           AMI SWIFT  61y Female    CHIEF COMPLAINT:    Patient is a 61y old  Female who presents with a chief complaint of stroke (2022 04:08)      INTERVAL HPI/OVERNIGHT EVENTS:    Patient seen and examined. C/O forgetfulness, tremors and episodes of dizziness.      ROS: All other systems are negative.    Vital Signs:    T(F): 96.8 (22 @ 04:45), Max: 97.8 (22 @ 14:32)  HR: 75 (22 @ 04:45) (75 - 88)  BP: 127/61 (22 @ 04:45) (127/61 - 156/59)  RR: 18 (22 @ 04:45) (18 - 18)  SpO2: --  I&O's Summary    2022 07:01  -  2022 07:00  --------------------------------------------------------  IN: 773 mL / OUT: 200 mL / NET: 573 mL      Daily     Daily Weight in k.8 (2022 04:45)  CAPILLARY BLOOD GLUCOSE          PHYSICAL EXAM:    GENERAL:  NAD  SKIN: No rashes or lesions  HENT: Atraumatic. Normocephalic. PERRL. Moist membranes.  NECK: Supple, No JVD. No lymphadenopathy.  PULMONARY: CTA B/L. No wheezing. No rales  CVS: Normal S1, S2. Rate and Rhythm are regular. No murmurs.  ABDOMEN/GI: Soft, Nontender, Nondistended; BS present  EXTREMITIES: Peripheral pulses intact. No edema B/L LE.  NEUROLOGIC:  No motor or sensory deficit.  PSYCH: Alert & oriented x 3    Consultant(s) Notes Reviewed:  [x ] YES  [ ] NO  Care Discussed with Consultants/Other Providers [ x] YES  [ ] NO    EKG reviewed  Telemetry reviewed    LABS:                        12.8   5.65  )-----------( 315      ( 2022 05:30 )             39.1     -    143  |  109  |  13  ----------------------------<  97  5.3<H>   |  26  |  0.7    Ca    9.6      2022 05:30  Mg     2.2         TPro  6.6  /  Alb  4.4  /  TBili  0.7  /  DBili  x   /  AST  30  /  ALT  35  /  AlkPhos  101      PT/INR - ( 2022 23:40 )   PT: 11.30 sec;   INR: 0.98 ratio         PTT - ( 2022 23:40 )  PTT:35.8 sec    Trop <0.01, CKMB --, CK --, 22 @ 04:30  Trop <0.01, CKMB --, CK --, 22 @ 23:40        RADIOLOGY & ADDITIONAL TESTS:    < from: MR Head No Cont (22 @ 23:18) >    IMPRESSION:    1.  No MRI evidence to suggest acute ischemic change.    2.  Subcortical white matter FLAIR hyperintensities, nonspecific,   differential diagnostic possibilities include chronic ischemic change,   gliosis or demyelination.    < end of copied text >  < from: VA Duplex Carotid, Bilat (22 @ 19:12) >  IMPRESSION: Mild 20-39% stenosis in bilateral internal carotid arteries    < end of copied text >  < from: EEG (22 @ 12:00) >    Impression:  Abnormal due to the presence of: focal slowing as above    < end of copied text >    Imaging or report Personally Reviewed:  [x ] YES  [ ] NO    Medications:  Standing  aspirin  chewable 81 milliGRAM(s) Oral daily  atorvastatin 20 milliGRAM(s) Oral at bedtime  budesonide  80 MICROgram(s)/formoterol 4.5 MICROgram(s) Inhaler 2 Puff(s) Inhalation two times a day  chlorhexidine 4% Liquid 1 Application(s) Topical <User Schedule>  DULoxetine 90 milliGRAM(s) Oral daily  levothyroxine 100 MICROGram(s) Oral daily  montelukast Oral Tab/Cap - Peds 10 milliGRAM(s) Oral at bedtime  pantoprazole    Tablet 40 milliGRAM(s) Oral before breakfast    PRN Meds      Case discussed with resident    Care discussed with pt/family

## 2022-02-22 NOTE — DISCHARGE NOTE PROVIDER - NSDCCPCAREPLAN_GEN_ALL_CORE_FT
PRINCIPAL DISCHARGE DIAGNOSIS  Diagnosis: Dysarthria  Assessment and Plan of Treatment: Your symptoms have resolved. You had an mri which did not show stroke. Follow up outpatient with neurology.   We cholesterol is mildly elevated, we started you on lipitor, follow up with your pcp.       PRINCIPAL DISCHARGE DIAGNOSIS  Diagnosis: Dysarthria  Assessment and Plan of Treatment: Your symptoms have resolved. You had an mri which did not show stroke. Follow up outpatient with neurology.   We cholesterol is mildly elevated, we started you on lipitor, follow up with your pcp.  We decreased your synthroid dose to 75.

## 2022-02-28 DIAGNOSIS — R47.1 DYSARTHRIA AND ANARTHRIA: ICD-10-CM

## 2022-02-28 DIAGNOSIS — E03.9 HYPOTHYROIDISM, UNSPECIFIED: ICD-10-CM

## 2022-02-28 DIAGNOSIS — Z86.16 PERSONAL HISTORY OF COVID-19: ICD-10-CM

## 2022-02-28 DIAGNOSIS — R29.898 OTHER SYMPTOMS AND SIGNS INVOLVING THE MUSCULOSKELETAL SYSTEM: ICD-10-CM

## 2022-02-28 DIAGNOSIS — M79.7 FIBROMYALGIA: ICD-10-CM

## 2022-02-28 DIAGNOSIS — J45.909 UNSPECIFIED ASTHMA, UNCOMPLICATED: ICD-10-CM

## 2022-02-28 DIAGNOSIS — R25.1 TREMOR, UNSPECIFIED: ICD-10-CM

## 2022-02-28 DIAGNOSIS — R29.810 FACIAL WEAKNESS: ICD-10-CM

## 2022-02-28 DIAGNOSIS — Z79.890 HORMONE REPLACEMENT THERAPY: ICD-10-CM

## 2022-02-28 DIAGNOSIS — Z91.041 RADIOGRAPHIC DYE ALLERGY STATUS: ICD-10-CM

## 2022-04-12 PROBLEM — M79.7 FIBROMYALGIA: Chronic | Status: ACTIVE | Noted: 2022-02-21

## 2022-04-12 PROBLEM — J45.909 UNSPECIFIED ASTHMA, UNCOMPLICATED: Chronic | Status: ACTIVE | Noted: 2022-02-20

## 2022-04-12 NOTE — OCCUPATIONAL THERAPY INITIAL EVALUATION ADULT - PHYSICAL ASSIST/NONPHYSICAL ASSIST: SIT/SUPINE, REHAB EVAL
supervision Full Thickness Lip Wedge Repair (Flap) Text: Given the location of the defect and the proximity to free margins a full thickness wedge repair was deemed most appropriate.  Using a sterile surgical marker, the appropriate repair was drawn incorporating the defect and placing the expected incisions perpendicular to the vermilion border.  The vermilion border was also meticulously outlined to ensure appropriate reapproximation during the repair.  The area thus outlined was incised through and through with a #15 scalpel blade.  The muscularis and dermis were reaproximated with deep sutures following hemostasis. Care was taken to realign the vermilion border before proceeding with the superficial closure.  Once the vermilion was realigned the superfical and mucosal closure was finished.

## 2022-06-01 ENCOUNTER — APPOINTMENT (OUTPATIENT)
Dept: NEUROPSYCHOLOGY | Facility: CLINIC | Age: 62
End: 2022-06-01

## 2022-06-20 ENCOUNTER — APPOINTMENT (OUTPATIENT)
Dept: NEUROPSYCHOLOGY | Facility: CLINIC | Age: 62
End: 2022-06-20

## 2022-06-20 ENCOUNTER — OUTPATIENT (OUTPATIENT)
Dept: OUTPATIENT SERVICES | Facility: HOSPITAL | Age: 62
LOS: 1 days | Discharge: HOME | End: 2022-06-20

## 2022-06-20 DIAGNOSIS — G31.84 MILD COGNITIVE IMPAIRMENT OF UNCERTAIN OR UNKNOWN ETIOLOGY: ICD-10-CM

## 2022-06-20 DIAGNOSIS — Z98.890 OTHER SPECIFIED POSTPROCEDURAL STATES: Chronic | ICD-10-CM

## 2022-06-20 DIAGNOSIS — F09 UNSPECIFIED MENTAL DISORDER DUE TO KNOWN PHYSIOLOGICAL CONDITION: ICD-10-CM

## 2022-08-17 ENCOUNTER — APPOINTMENT (OUTPATIENT)
Dept: SURGERY | Facility: CLINIC | Age: 62
End: 2022-08-17

## 2022-08-17 NOTE — OCCUPATIONAL THERAPY INITIAL EVALUATION ADULT - LUE MMT, REHAB EVAL
Admission Reconciliation is Completed  Discharge Reconciliation is Not Complete inconsistent strength testing in lue.

## 2023-01-24 ENCOUNTER — APPOINTMENT (OUTPATIENT)
Dept: ORTHOPEDIC SURGERY | Facility: CLINIC | Age: 63
End: 2023-01-24

## 2023-01-24 VITALS — BODY MASS INDEX: 24.84 KG/M2 | WEIGHT: 135 LBS | HEIGHT: 62 IN

## 2023-02-14 ENCOUNTER — APPOINTMENT (OUTPATIENT)
Dept: ORTHOPEDIC SURGERY | Facility: CLINIC | Age: 63
End: 2023-02-14
Payer: COMMERCIAL

## 2023-02-14 PROCEDURE — 99204 OFFICE O/P NEW MOD 45 MIN: CPT

## 2023-02-14 NOTE — ASSESSMENT
[FreeTextEntry1] : Patient comes in with numbness and tingling in both of her hands. She states it has been happening for the last 6 months.  She says she was wearing braces to sleep at night which helped her but now she has numbness and tingling in the fingers most of the time.  She also complains of left wrist pain.\par \par B/L hand: \par Tender volar wrist \par Good finger ROM \par +Tinels \par +Phalens \par +Compression test \par Decreased sensation median nerve distribution\par \par L wrist\par Mild swelling\par Tender first dorsal comp\par Decreased thumb and wrist ROM\par +Finklesteins\par \par EMG/NCV shows bilateral carpal tunnel right moderate left mild\par \par The patient was advised of the diagnosis.  The natural history of the pathology was explained in full to the patient in layman's terms. We discussed the nature of the nerve as an electrical cable and what happens to the nerve in carpal tunnel syndrome.  We discussed that treatment for night symptoms included night bracing.  We discussed the possibility of injection when symptoms were intermittent or in patients who were unwilling to undergo surgery with constant symptoms.  We discussed that injection is a diagnostic and therapeutic aide and what this means.  We discussed the use of nerve testing in cases when diagnosis was in doubt or for confirmation to exclude alternate pathology.  We discussed that if symptoms were 24/7 surgery was recommended to give the nerve the best chance to recover but that once symptoms were constant, the nerve may not recover even with surgery.  We discussed that if left alone the nerve progression could worsen and that treatment was indicated to prevent progression of nerve compression.  The longer the nerve is left, the more likely to cause worsening irreversible damage.  All questions were answered.  The risks and benefits of surgical and non-surgical treatment alternatives were explained in full to the patient.\par \par \par We discussed the recommendation for carpal tunnel surgery- We reviewed that the goal of surgery is to prevent worsening and give the nerve a chance to recover. If symptoms are constant there is a chance that the nerve is already too badly damaged and no longer has the potential to recover.Risks, benefits, and alternatives of surgery discussed with patient (and family).Risks including but not limited to infection, blood loss, tendon damage, muscle damage, nerve damage, stiffness, pain, no resolution of symptoms, CRPS, loss of function, potential for secondary surgery, loss of limb or life.Patient understands and was allowed to voice questions or concerns.They agree to surgery\par \par We again reviewed the anatomy of the first dorsal extensor compartment and DeQuervain's tenosynovitis.  The patient has failed injections/nonoperative treatment.  We discussed the possibility of surgery including the use of an open release.  We discussed that too many injections may lead to weakening of the tendon/tendon rupture and that surgery is indicated at this point.  Risks include bleeding, infection, injury to nerves, vessels, tendons, stiffness, pain, loss of range of motion, loss of function, CRPS, and risks and complications of anesthesia.  We also discussed that there may be some paresthesias after surgery as there are nerve branches on either side of the tendon that are protected during the procedure but occasionally suffer traction while keeping them safe.  This usually, but doesn't always resolve.  We discussed the surgical plan and post op expectations.  We also discussed the possibility of prolonged pain/scar tissue and the possible need for therapy.  The patient is amenable to the risks, had the opportunity to ask questions, all questions were answered and the patient signed consent of their own accord.  They will be contacted by my surgical scheduler.\par \par She will do her right hand first.  And then will move to the left side she will have a de Quervain's done at the same time.

## 2023-03-06 ENCOUNTER — RESULT REVIEW (OUTPATIENT)
Age: 63
End: 2023-03-06

## 2023-03-06 ENCOUNTER — OUTPATIENT (OUTPATIENT)
Dept: OUTPATIENT SERVICES | Facility: HOSPITAL | Age: 63
LOS: 1 days | End: 2023-03-06
Payer: COMMERCIAL

## 2023-03-06 VITALS
RESPIRATION RATE: 18 BRPM | HEIGHT: 62 IN | HEART RATE: 74 BPM | OXYGEN SATURATION: 98 % | DIASTOLIC BLOOD PRESSURE: 67 MMHG | WEIGHT: 141.1 LBS | SYSTOLIC BLOOD PRESSURE: 151 MMHG | TEMPERATURE: 97 F

## 2023-03-06 DIAGNOSIS — G56.02 CARPAL TUNNEL SYNDROME, LEFT UPPER LIMB: ICD-10-CM

## 2023-03-06 DIAGNOSIS — Z98.890 OTHER SPECIFIED POSTPROCEDURAL STATES: Chronic | ICD-10-CM

## 2023-03-06 DIAGNOSIS — Z01.818 ENCOUNTER FOR OTHER PREPROCEDURAL EXAMINATION: ICD-10-CM

## 2023-03-06 LAB
ALBUMIN SERPL ELPH-MCNC: 4.5 G/DL — SIGNIFICANT CHANGE UP (ref 3.5–5.2)
ALP SERPL-CCNC: 119 U/L — HIGH (ref 30–115)
ALT FLD-CCNC: 31 U/L — SIGNIFICANT CHANGE UP (ref 0–41)
ANION GAP SERPL CALC-SCNC: 12 MMOL/L — SIGNIFICANT CHANGE UP (ref 7–14)
APTT BLD: 31.7 SEC — SIGNIFICANT CHANGE UP (ref 27–39.2)
AST SERPL-CCNC: 37 U/L — SIGNIFICANT CHANGE UP (ref 0–41)
BASOPHILS # BLD AUTO: 0.06 K/UL — SIGNIFICANT CHANGE UP (ref 0–0.2)
BASOPHILS NFR BLD AUTO: 1.1 % — HIGH (ref 0–1)
BILIRUB SERPL-MCNC: 0.6 MG/DL — SIGNIFICANT CHANGE UP (ref 0.2–1.2)
BUN SERPL-MCNC: 10 MG/DL — SIGNIFICANT CHANGE UP (ref 10–20)
CALCIUM SERPL-MCNC: 9.6 MG/DL — SIGNIFICANT CHANGE UP (ref 8.4–10.5)
CHLORIDE SERPL-SCNC: 104 MMOL/L — SIGNIFICANT CHANGE UP (ref 98–110)
CO2 SERPL-SCNC: 24 MMOL/L — SIGNIFICANT CHANGE UP (ref 17–32)
CREAT SERPL-MCNC: 0.6 MG/DL — LOW (ref 0.7–1.5)
EGFR: 101 ML/MIN/1.73M2 — SIGNIFICANT CHANGE UP
EOSINOPHIL # BLD AUTO: 0.2 K/UL — SIGNIFICANT CHANGE UP (ref 0–0.7)
EOSINOPHIL NFR BLD AUTO: 3.6 % — SIGNIFICANT CHANGE UP (ref 0–8)
GLUCOSE SERPL-MCNC: 120 MG/DL — HIGH (ref 70–99)
HCT VFR BLD CALC: 35 % — LOW (ref 37–47)
HGB BLD-MCNC: 11.7 G/DL — LOW (ref 12–16)
IMM GRANULOCYTES NFR BLD AUTO: 0.2 % — SIGNIFICANT CHANGE UP (ref 0.1–0.3)
INR BLD: 0.97 RATIO — SIGNIFICANT CHANGE UP (ref 0.65–1.3)
LYMPHOCYTES # BLD AUTO: 1.53 K/UL — SIGNIFICANT CHANGE UP (ref 1.2–3.4)
LYMPHOCYTES # BLD AUTO: 27.5 % — SIGNIFICANT CHANGE UP (ref 20.5–51.1)
MCHC RBC-ENTMCNC: 31.3 PG — HIGH (ref 27–31)
MCHC RBC-ENTMCNC: 33.4 G/DL — SIGNIFICANT CHANGE UP (ref 32–37)
MCV RBC AUTO: 93.6 FL — SIGNIFICANT CHANGE UP (ref 81–99)
MONOCYTES # BLD AUTO: 0.3 K/UL — SIGNIFICANT CHANGE UP (ref 0.1–0.6)
MONOCYTES NFR BLD AUTO: 5.4 % — SIGNIFICANT CHANGE UP (ref 1.7–9.3)
NEUTROPHILS # BLD AUTO: 3.46 K/UL — SIGNIFICANT CHANGE UP (ref 1.4–6.5)
NEUTROPHILS NFR BLD AUTO: 62.2 % — SIGNIFICANT CHANGE UP (ref 42.2–75.2)
NRBC # BLD: 0 /100 WBCS — SIGNIFICANT CHANGE UP (ref 0–0)
PLATELET # BLD AUTO: 306 K/UL — SIGNIFICANT CHANGE UP (ref 130–400)
POTASSIUM SERPL-MCNC: 4.4 MMOL/L — SIGNIFICANT CHANGE UP (ref 3.5–5)
POTASSIUM SERPL-SCNC: 4.4 MMOL/L — SIGNIFICANT CHANGE UP (ref 3.5–5)
PROT SERPL-MCNC: 6.9 G/DL — SIGNIFICANT CHANGE UP (ref 6–8)
PROTHROM AB SERPL-ACNC: 11 SEC — SIGNIFICANT CHANGE UP (ref 9.95–12.87)
RBC # BLD: 3.74 M/UL — LOW (ref 4.2–5.4)
RBC # FLD: 12.8 % — SIGNIFICANT CHANGE UP (ref 11.5–14.5)
SODIUM SERPL-SCNC: 140 MMOL/L — SIGNIFICANT CHANGE UP (ref 135–146)
WBC # BLD: 5.56 K/UL — SIGNIFICANT CHANGE UP (ref 4.8–10.8)
WBC # FLD AUTO: 5.56 K/UL — SIGNIFICANT CHANGE UP (ref 4.8–10.8)

## 2023-03-06 PROCEDURE — 85025 COMPLETE CBC W/AUTO DIFF WBC: CPT

## 2023-03-06 PROCEDURE — 99214 OFFICE O/P EST MOD 30 MIN: CPT | Mod: 25

## 2023-03-06 PROCEDURE — 71046 X-RAY EXAM CHEST 2 VIEWS: CPT | Mod: 26

## 2023-03-06 PROCEDURE — 85730 THROMBOPLASTIN TIME PARTIAL: CPT

## 2023-03-06 PROCEDURE — 85610 PROTHROMBIN TIME: CPT

## 2023-03-06 PROCEDURE — 71046 X-RAY EXAM CHEST 2 VIEWS: CPT

## 2023-03-06 PROCEDURE — 93005 ELECTROCARDIOGRAM TRACING: CPT

## 2023-03-06 PROCEDURE — 80053 COMPREHEN METABOLIC PANEL: CPT

## 2023-03-06 PROCEDURE — 36415 COLL VENOUS BLD VENIPUNCTURE: CPT

## 2023-03-06 PROCEDURE — 93010 ELECTROCARDIOGRAM REPORT: CPT

## 2023-03-06 RX ORDER — MONTELUKAST 4 MG/1
50 TABLET, CHEWABLE ORAL
Qty: 0 | Refills: 0 | DISCHARGE

## 2023-03-06 NOTE — H&P PST ADULT - HISTORY OF PRESENT ILLNESS
Pt denies cp palp uri cough dysuria or sob.   ET: 1 FOS- with SOB .   et 1 flat block with SOB  patient stated SOB easily with ADL  and IADL daily since 2020  covid  . pt started to follow cardio dr cerrato since for new onset arrhythmia  and asthma .   denies recent cough fever or infection. aware to self quaerantine preop. all instructions reviewed.  advised preop cardio clearance needed    scheduled for 3/20 left hand carpal tunnel release possible open left first dorsal extendor. patient has been experiencing increasing aching/throbbing  left hand/ wrist pain x 6 months  pain scale 6/10 . decreases with repositioning at times.    As per patient, this is their complete medical and surgical history, including medications both prescribed or over the counter.    Patient verbalized understanding of instructions and was given the opportunity to ask questions and have them answered.    Patient denies any signs or symptoms of COVID 19 and denies contact with known positive individuals.  They have an appointment for repeat COVID testing pre-procedure and acknowledge its time and place.  They were instructed to quarantine pre-procedure, practice exposure control measures, continue to self-monitor and report any concerns to their proceduralist.    Anesthesia Alert  NO--Difficult Airway  NO--History of neck surgery or radiation  NO--Limited ROM of neck  NO--History of Malignant hyperthermia  NO--Personal or family history of Pseudocholinesterase deficiency.  NO--Prior Anesthesia Complication  NO--Latex Allergy  NO--Loose teeth  NO--History of Rheumatoid Arthritis  unknown--CARLEE  admits snoring  NO--Bleeding risk  yes--Other_____ iodine allergy

## 2023-03-06 NOTE — H&P PST ADULT - NSICDXPASTMEDICALHX_GEN_ALL_CORE_FT
PAST MEDICAL HISTORY:  Anxiety     Arrhythmia     Asthma     Fibromyalgia     High cholesterol     Hypothyroidism

## 2023-03-06 NOTE — H&P PST ADULT - NSANTHOSAYNRD_GEN_A_CORE
denies qi- admits to snoring/No. QI screening performed.  STOP BANG Legend: 0-2 = LOW Risk; 3-4 = INTERMEDIATE Risk; 5-8 = HIGH Risk

## 2023-03-07 DIAGNOSIS — G56.02 CARPAL TUNNEL SYNDROME, LEFT UPPER LIMB: ICD-10-CM

## 2023-03-07 DIAGNOSIS — Z01.818 ENCOUNTER FOR OTHER PREPROCEDURAL EXAMINATION: ICD-10-CM

## 2023-03-20 ENCOUNTER — APPOINTMENT (OUTPATIENT)
Dept: ORTHOPEDIC SURGERY | Facility: AMBULATORY SURGERY CENTER | Age: 63
End: 2023-03-20

## 2023-03-22 PROBLEM — F41.9 ANXIETY DISORDER, UNSPECIFIED: Chronic | Status: ACTIVE | Noted: 2023-03-06

## 2023-03-22 PROBLEM — E78.00 PURE HYPERCHOLESTEROLEMIA, UNSPECIFIED: Chronic | Status: ACTIVE | Noted: 2023-03-06

## 2023-03-22 PROBLEM — I49.9 CARDIAC ARRHYTHMIA, UNSPECIFIED: Chronic | Status: ACTIVE | Noted: 2023-03-06

## 2023-03-24 ENCOUNTER — APPOINTMENT (OUTPATIENT)
Dept: ORTHOPEDIC SURGERY | Facility: CLINIC | Age: 63
End: 2023-03-24

## 2023-03-31 NOTE — ASU PATIENT PROFILE, ADULT - FALL HARM RISK - HARM RISK INTERVENTIONS

## 2023-04-03 ENCOUNTER — TRANSCRIPTION ENCOUNTER (OUTPATIENT)
Age: 63
End: 2023-04-03

## 2023-04-03 ENCOUNTER — APPOINTMENT (OUTPATIENT)
Dept: ORTHOPEDIC SURGERY | Facility: AMBULATORY SURGERY CENTER | Age: 63
End: 2023-04-03
Payer: COMMERCIAL

## 2023-04-03 ENCOUNTER — OUTPATIENT (OUTPATIENT)
Dept: INPATIENT UNIT | Facility: HOSPITAL | Age: 63
LOS: 1 days | Discharge: HOME | End: 2023-04-03
Payer: COMMERCIAL

## 2023-04-03 VITALS
WEIGHT: 136.03 LBS | TEMPERATURE: 98 F | OXYGEN SATURATION: 97 % | RESPIRATION RATE: 20 BRPM | HEIGHT: 62 IN | HEART RATE: 61 BPM | DIASTOLIC BLOOD PRESSURE: 63 MMHG | SYSTOLIC BLOOD PRESSURE: 138 MMHG

## 2023-04-03 VITALS
SYSTOLIC BLOOD PRESSURE: 122 MMHG | DIASTOLIC BLOOD PRESSURE: 62 MMHG | OXYGEN SATURATION: 97 % | RESPIRATION RATE: 17 BRPM | HEART RATE: 71 BPM

## 2023-04-03 DIAGNOSIS — Z98.890 OTHER SPECIFIED POSTPROCEDURAL STATES: Chronic | ICD-10-CM

## 2023-04-03 DIAGNOSIS — M65.4 RADIAL STYLOID TENOSYNOVITIS [DE QUERVAIN]: ICD-10-CM

## 2023-04-03 DIAGNOSIS — G56.02 CARPAL TUNNEL SYNDROME, LEFT UPPER LIMB: ICD-10-CM

## 2023-04-03 PROCEDURE — 25000 INCISION OF TENDON SHEATH: CPT | Mod: LT

## 2023-04-03 PROCEDURE — 29848 WRIST ENDOSCOPY/SURGERY: CPT | Mod: LT

## 2023-04-03 RX ORDER — PANTOPRAZOLE SODIUM 20 MG/1
1 TABLET, DELAYED RELEASE ORAL
Qty: 0 | Refills: 0 | DISCHARGE

## 2023-04-03 RX ORDER — SODIUM CHLORIDE 9 MG/ML
1000 INJECTION, SOLUTION INTRAVENOUS
Refills: 0 | Status: DISCONTINUED | OUTPATIENT
Start: 2023-04-03 | End: 2023-04-03

## 2023-04-03 RX ORDER — IVABRADINE 7.5 MG/1
1 TABLET, FILM COATED ORAL
Qty: 0 | Refills: 0 | DISCHARGE

## 2023-04-03 RX ORDER — AZELASTINE 137 UG/1
2 SPRAY, METERED NASAL
Qty: 0 | Refills: 0 | DISCHARGE

## 2023-04-03 RX ORDER — BUDESONIDE AND FORMOTEROL FUMARATE DIHYDRATE 160; 4.5 UG/1; UG/1
2 AEROSOL RESPIRATORY (INHALATION)
Qty: 0 | Refills: 0 | DISCHARGE

## 2023-04-03 RX ORDER — DULOXETINE HYDROCHLORIDE 30 MG/1
1 CAPSULE, DELAYED RELEASE ORAL
Qty: 0 | Refills: 0 | DISCHARGE

## 2023-04-03 NOTE — BRIEF OPERATIVE NOTE - NSICDXBRIEFPROCEDURE_GEN_ALL_CORE_FT
PROCEDURES:  Endoscopic carpal tunnel release 03-Apr-2023 12:57:12  Zari Severino  DeQuervains release 03-Apr-2023 12:57:39  Zari Severino

## 2023-04-03 NOTE — PRE-ANESTHESIA EVALUATION ADULT - NSRADCARDRESULTSFT_GEN_ALL_CORE
EKG:    Ventricular Rate 67 BPM    Atrial Rate 67 BPM    P-R Interval 122 ms    QRS Duration 80 ms    Q-T Interval 398 ms    QTC Calculation(Bazett) 420 ms    P Axis 65 degrees    R Axis 46 degrees    T Axis 78 degrees    Diagnosis Line Normal sinus rhythm  Nonspecific T wave abnormality  Abnormal ECG    Confirmed by Cherelle Mg MD ([id number]) on 3/6/2023 6:59:26 PM    CXR:      ACC: [id number] EXAM:  XR CHEST PA LAT 2V   ORDERED BY: TINY TURCIOS     PROCEDURE DATE:  03/06/2023          INTERPRETATION:  Clinical History / Reason for exam: Preoperative    Comparison : Chest radiograph None.    Technique/Positioning: Frontal and lateral chest radiograph.    Findings:    Support devices: None.    Cardiac/mediastinum/hilum: Unremarkable.    Lung parenchyma/Pleura: No focal pulmonary consolidation, pleural   effusion, or pneumothorax.    Skeleton/soft tissues: Curvilinear radiodensities overlying the breasts,   possibly prostheses.    Impression:    No focal pulmonary consolidation        --- End of Report ---            CLEM AU MD; Attending Radiologist  This document has been electronically signed. Mar  6 2023 11:50AM

## 2023-04-03 NOTE — ASU DISCHARGE PLAN (ADULT/PEDIATRIC) - NS MD DC FALL RISK RISK
For information on Fall & Injury Prevention, visit: [email or web address] OR  [email or web address] OR  https://www.cdc.gov/steadi/patient.html

## 2023-04-03 NOTE — BRIEF OPERATIVE NOTE - NSICDXBRIEFPOSTOP_GEN_ALL_CORE_FT
Quality 130: Documentation Of Current Medications In The Medical Record: Current Medications Documented Detail Level: Zone POST-OP DIAGNOSIS:  De Quervain's tenosynovitis 03-Apr-2023 12:58:41  Zari Severino  Left carpal tunnel syndrome 03-Apr-2023 12:58:37  Zari Severino

## 2023-04-03 NOTE — BRIEF OPERATIVE NOTE - NSICDXBRIEFPREOP_GEN_ALL_CORE_FT
PRE-OP DIAGNOSIS:  Left carpal tunnel syndrome 03-Apr-2023 12:58:34  Zari Severino  De Quervain's tenosynovitis 03-Apr-2023 12:58:03  Zari Severino

## 2023-04-03 NOTE — ASU DISCHARGE PLAN (ADULT/PEDIATRIC) - CARE PROVIDER_API CALL
Zari Severino)  Orthopaedic Surgery; Surgery of the Hand  [address] [place] [address]  Phone: [phone]  Fax: [phone]  Follow Up Time:

## 2023-04-06 DIAGNOSIS — F41.9 ANXIETY DISORDER, UNSPECIFIED: ICD-10-CM

## 2023-04-06 DIAGNOSIS — G56.02 CARPAL TUNNEL SYNDROME, LEFT UPPER LIMB: ICD-10-CM

## 2023-04-06 DIAGNOSIS — M65.4 RADIAL STYLOID TENOSYNOVITIS [DE QUERVAIN]: ICD-10-CM

## 2023-04-06 DIAGNOSIS — J45.909 UNSPECIFIED ASTHMA, UNCOMPLICATED: ICD-10-CM

## 2023-04-06 DIAGNOSIS — E03.9 HYPOTHYROIDISM, UNSPECIFIED: ICD-10-CM

## 2023-04-06 DIAGNOSIS — E78.00 PURE HYPERCHOLESTEROLEMIA, UNSPECIFIED: ICD-10-CM

## 2023-04-06 DIAGNOSIS — Z91.041 RADIOGRAPHIC DYE ALLERGY STATUS: ICD-10-CM

## 2023-04-07 ENCOUNTER — APPOINTMENT (OUTPATIENT)
Dept: ORTHOPEDIC SURGERY | Facility: CLINIC | Age: 63
End: 2023-04-07
Payer: COMMERCIAL

## 2023-04-07 PROCEDURE — 99024 POSTOP FOLLOW-UP VISIT: CPT

## 2023-04-07 NOTE — ASSESSMENT
[FreeTextEntry1] : Patient comes in for her first post op. She is doing well. She has no complaints.  She says the numbness and tingling is better.  The wrist pain is better.\par \par  Incision sites clean dry and intact, sutures removed, full range of motion of fingers, neurovascularly intact\par \par \par sutures were removed today. Patient was advised to start scar massage. She will follow up in 3 weeks.

## 2023-04-28 ENCOUNTER — APPOINTMENT (OUTPATIENT)
Dept: ORTHOPEDIC SURGERY | Facility: CLINIC | Age: 63
End: 2023-04-28
Payer: COMMERCIAL

## 2023-04-28 DIAGNOSIS — G56.03 CARPAL TUNNEL SYNDROM,BILATERAL UPPER LIMBS: ICD-10-CM

## 2023-04-28 DIAGNOSIS — M65.4 RADIAL STYLOID TENOSYNOVITIS [DE QUERVAIN]: ICD-10-CM

## 2023-04-28 PROCEDURE — 99024 POSTOP FOLLOW-UP VISIT: CPT

## 2023-04-28 NOTE — ASSESSMENT
[FreeTextEntry1] : Patient comes in for her second  post op. She is doing well. She says the numbness and tingling is better. The wrist pain is better.  She still having some wrist pain.  She has been scar massage. \par \par  Incision sites well-healed, full range of motion of fingers, neurovascularly intact\par \par Patient will start therapy.  She says that she cannot do scar massage very hard and therefore I am recommending she go to therapy which I believe will help her.  She says when she does scar massage over the deeper veins release she has some tingling.  This is because there is some scarring to the radial sensory nerve.  I believe scar massage will be helpful.  She will follow-up in a month.  In addition she is complaining of some pain in her heel.  She will see Dr. Yip for this

## 2023-05-01 ENCOUNTER — APPOINTMENT (OUTPATIENT)
Dept: ORTHOPEDIC SURGERY | Facility: CLINIC | Age: 63
End: 2023-05-01

## 2023-05-22 ENCOUNTER — APPOINTMENT (OUTPATIENT)
Dept: ORTHOPEDIC SURGERY | Facility: CLINIC | Age: 63
End: 2023-05-22
Payer: COMMERCIAL

## 2023-05-22 ENCOUNTER — RESULT CHARGE (OUTPATIENT)
Age: 63
End: 2023-05-22

## 2023-05-22 VITALS — HEIGHT: 62 IN | WEIGHT: 136 LBS | BODY MASS INDEX: 25.03 KG/M2

## 2023-05-22 DIAGNOSIS — M72.2 PLANTAR FASCIAL FIBROMATOSIS: ICD-10-CM

## 2023-05-22 PROCEDURE — 73650 X-RAY EXAM OF HEEL: CPT | Mod: RT

## 2023-05-22 PROCEDURE — 99213 OFFICE O/P EST LOW 20 MIN: CPT | Mod: 24

## 2023-05-22 NOTE — DISCUSSION/SUMMARY
[de-identified] : Findings with her.  I think at this time she would benefit from anti-inflammatory medication in the form meloxicam.  I discussed with her the side effects of this medication.  We also discussed a home exercise program.  Should she not improve I will see her back in 1 month and consider an injection into the plantar fascia origin.  All questions answered.

## 2023-05-22 NOTE — DATA REVIEWED
[FreeTextEntry1] : Use of the patient's calcaneus ordered and reviewed by me personally.  No fracture or dislocation.  There is a plantar enthesophyte

## 2023-05-22 NOTE — HISTORY OF PRESENT ILLNESS
[de-identified] : 63-year-old patient seeing me for the first time in regards to her right heel pain.  She has atraumatic right heel pain which began several months ago.  Pain is worse with first up in the morning and after getting up after sitting for long period of time.  So far no formal treatment.  She has tried orthotics over-the-counter which have given her minimal relief.

## 2023-05-22 NOTE — PHYSICAL EXAM
[de-identified] : \par Patient comes in today for follow-up.Patient comes in today for follow-up of her right ankle fracture.  Alert oriented x3.  Pleasant cooperative that exam.  Exam of the right lower extremity.  Her skin is intact.  Tender to palpation at the origin of plantar fascia alone.  The Achilles tendon is intact.  She does have some tight Achilles.  Nontender at the tuberosity calcaneus.  Neurovascular intact distally.

## 2023-06-10 ENCOUNTER — APPOINTMENT (OUTPATIENT)
Dept: RADIOLOGY | Facility: CLINIC | Age: 63
End: 2023-06-10
Payer: COMMERCIAL

## 2023-06-10 ENCOUNTER — APPOINTMENT (OUTPATIENT)
Dept: PAIN MANAGEMENT | Facility: CLINIC | Age: 63
End: 2023-06-10
Payer: COMMERCIAL

## 2023-06-10 VITALS
WEIGHT: 130 LBS | SYSTOLIC BLOOD PRESSURE: 133 MMHG | BODY MASS INDEX: 23.92 KG/M2 | DIASTOLIC BLOOD PRESSURE: 70 MMHG | HEIGHT: 62 IN

## 2023-06-10 PROCEDURE — 99204 OFFICE O/P NEW MOD 45 MIN: CPT

## 2023-06-10 PROCEDURE — 72110 X-RAY EXAM L-2 SPINE 4/>VWS: CPT

## 2023-06-10 RX ORDER — METHYLPREDNISOLONE 4 MG/1
4 TABLET ORAL
Qty: 1 | Refills: 0 | Status: ACTIVE | COMMUNITY
Start: 2023-06-10 | End: 1900-01-01

## 2023-06-14 ENCOUNTER — RX RENEWAL (OUTPATIENT)
Age: 63
End: 2023-06-14

## 2023-06-14 ENCOUNTER — APPOINTMENT (OUTPATIENT)
Dept: ORTHOPEDIC SURGERY | Facility: CLINIC | Age: 63
End: 2023-06-14

## 2023-06-14 RX ORDER — MELOXICAM 7.5 MG/1
7.5 TABLET ORAL DAILY
Qty: 30 | Refills: 0 | Status: ACTIVE | COMMUNITY
Start: 2023-05-22 | End: 1900-01-01

## 2023-06-14 NOTE — DISCUSSION/SUMMARY
[de-identified] : Recommendations\par 1. medrol dose pack\par take as prescribed\par \par 2. MRI lumbar spine w/o contrast to evaluate for anatomic changes of the lumbar discs, nerves, and surrounding tissue that will help provide information to accurately diagnose the patient's cause of pain and therefore treat said pain generator in the most effective way possible - whether that be specific physical therapy recommendations, medications, and/or interventional therapies. \par \par 3. xray lumbar spine 4 view\par \par f/u in 2 weeks

## 2023-06-14 NOTE — HISTORY OF PRESENT ILLNESS
[FreeTextEntry1] : Diane presents with complaints of low back pain with referred pattern into the bilateral legs. The pain has been ongoing now for about 1 month, with acutely worsening symptoms over the past 1 week. The pain is constant 100% of the time and is very severe in the evening and night. The pain is shooting, pressure-like, dull aching and throbbing. The pain is made worse with standing and walking. pain is located in both sides of the low back / buttock. The patient cannot participate in recreational activities 2/2 to the severe pain and is not using any assistive device at this time. she has tried chiropractic manipulation without success. She has taken tylenol and motrin without success.

## 2023-06-14 NOTE — PHYSICAL EXAM
[de-identified] : Lumbar Spine Exam:\par Inspection:\par erythema (-)\par ecchymosis (-)\par rashes (-)\par alignment: no scoliosis\par \par Palpation:\par Midline lumbar tenderness:             (-)\par paraspinal tenderness:                  L (+) ; R (+)\par sciatic nerve tenderness :             L (+) ; R (+)\par SI joint tenderness:                        L (-) ; R (-)\par GTB tenderness:                            L (-);  R (-)\par \par Limited ROM 2/2 to pain with back extension and rotation \par \par Strength:\par 5/5 throughout all muscle groups of the lower extremity\par \par                                    Right       Left   \par Hip Flexion:                (5/5)       (5/5)\par Quadriceps:               (5/5)       (5/5)\par Hamstrings:                (5/5)       (5/5)\par Ankle Dorsiflexion:     (5/5)       (5/5)\par EHL:                           (5/5)       (5/5)\par Ankle Plantarflexion:  (5/5)       (5/5)\par \par Special Tests:\par SLR:                           R (-) ; L (+)\par Facet loading:            R (+) ; L (+)\par JAMISON test:               R (-) ; L (-)\par \par Neurologic:\par Light touch intact throughout LE \par Reflexes normal and symmetric \par \par Gait:\par cautious gait\par ambulates w/o assistive device

## 2023-06-27 ENCOUNTER — APPOINTMENT (OUTPATIENT)
Dept: PAIN MANAGEMENT | Facility: CLINIC | Age: 63
End: 2023-06-27

## 2023-07-21 ENCOUNTER — APPOINTMENT (OUTPATIENT)
Dept: PAIN MANAGEMENT | Facility: CLINIC | Age: 63
End: 2023-07-21
Payer: COMMERCIAL

## 2023-07-21 DIAGNOSIS — M54.16 RADICULOPATHY, LUMBAR REGION: ICD-10-CM

## 2023-07-21 PROCEDURE — 99214 OFFICE O/P EST MOD 30 MIN: CPT

## 2023-07-21 RX ORDER — HYDROCODONE BITARTRATE AND ACETAMINOPHEN 5; 325 MG/1; MG/1
5-325 TABLET ORAL
Qty: 28 | Refills: 0 | Status: ACTIVE | COMMUNITY
Start: 2023-07-21 | End: 1900-01-01

## 2023-07-24 NOTE — DISCHARGE NOTE PROVIDER - NSDCCPCAREPLAN_GEN_ALL_CORE_FT
Low risk.   PRINCIPAL DISCHARGE DIAGNOSIS  Diagnosis: CAP (community acquired pneumonia)  Assessment and Plan of Treatment: continue  azithromycin      SECONDARY DISCHARGE DIAGNOSES  Diagnosis: Failure of outpatient treatment  Assessment and Plan of Treatment: continue  to keep quarantine  precatuions fro 2 more weeks, monitor temperature and breathing if shortness of breath on exertion call your pmd  and discuss  or get medical health    Diagnosis: Viral illness  Assessment and Plan of Treatment: continue quarantine, may take tylenol for mild temp and body aches ,  cover your cough and sneesing , may take over the counter cough  medicine

## 2023-07-25 NOTE — DISCUSSION/SUMMARY
[de-identified] : Recommendations\par 1. Ordered Vicodin one pill every 8 hours prn\par The R/B/A of chronic opiate therapy for non-malignant pain including, but not limited to, the risk of addiction, overdose, respiratory depression, and death was discussed and accepted by the patient.  Patient was also informed that they should not consume alcohol or drive a motor vehicle under any circumstances while taking opiate pain medications. \par The patient was explained the narcotic contract and given adequate time and explanation regarding our policies. The patient agreed and signed our narcotic contract.  istop registry was checked and verified. \par Urine toxicology screen will be performed at random and occasionally to monitor for any inconsistencies. There is a zero tolerance policy for inconsistencies and are grounds for discharge from the practice at the discretion of the physician and the physician alone. \par The patient should never expect any controlled substance be sent over the phone or without an in office visit. \par \par 2. Will proceed with bilateral L4-5 TFESI with mac\par Patient is presenting with acute/sub-acute lumbar radicular pain with impairment in ADLs and functionality.  The pain has not responded to conservative care including NSAID therapy and/or physical therapy.  There is no bleeding tendency, unstable medical condition, or systemic infection.\par \par follow up two weeks after injection \par \par I Gina Pressley attest that this documentation has been prepared under the direction and in the presence of provider Dr. Malcolm Marshall. \par The documentation recorded by the scribe in my presence, accurately reflects the service I personally performed, and the decisions made by me with my edits as appropriate. \par \par Malcolm Marshall, DO\par

## 2023-07-25 NOTE — PHYSICAL EXAM
[de-identified] : Lumbar Spine Exam:\par Inspection:\par erythema (-)\par ecchymosis (-)\par rashes (-)\par alignment: no scoliosis\par \par Palpation:\par Midline lumbar tenderness:             (-)\par paraspinal tenderness:                  L (+) ; R (+)\par sciatic nerve tenderness :             L (+) ; R (+)\par SI joint tenderness:                        L (-) ; R (+)\par GTB tenderness:                            L (-);  R (-)\par \par Limited ROM 2/2 to pain with back extension and rotation \par \par Strength:\par 5/5 throughout all muscle groups of the lower extremity\par \par                                    Right       Left   \par Hip Flexion:                (5/5)       (5/5)\par Quadriceps:               (5/5)       (5/5)\par Hamstrings:                (5/5)       (5/5)\par Ankle Dorsiflexion:     (5/5)       (5/5)\par EHL:                           (5/5)       (5/5)\par Ankle Plantarflexion:  (5/5)       (5/5)\par \par Special Tests:\par SLR:                           R (-) ; L (+)\par Facet loading:            R (+) ; L (+)\par JAMISON test:               R (-) ; L (-)\par \par Neurologic:\par Light touch intact throughout LE \par Reflexes normal and symmetric \par \par Gait:\par cautious gait\par ambulates w/o assistive device  6/22

## 2023-07-25 NOTE — ASSESSMENT
[FreeTextEntry1] : Lumbar radiculopaty\par Lumbar spondylosis w/ facet degenerative changes\par Grade 1 L4-L5 spondylolisthesis

## 2023-07-25 NOTE — DATA REVIEWED
[FreeTextEntry1] : x ray of the lumbar spine 6/10/23:\par IMPRESSION: Mild levoscoliosis with preservation of sagittal lordosis. Moderate to severe L5-S1 disc \par degeneration. Mild grade 1 anterior degenerative listhesis of L4 relative to L5 with evidence of mild \par spondylosis. No evidence of instability when comparing neutral to flexion and extension views.\par \par MRI of the lumbar spine 7/19/23: \par grade 1 L4-5 spondylolisthesis\par Schmorl's node particularly inferiorly at L5\par Lower lumbar degenerative disc/facet change\par No nathan lumbar disc herniation, spinal canal or foraminal stenosis.

## 2023-07-25 NOTE — HISTORY OF PRESENT ILLNESS
[FreeTextEntry1] : Diane presents with complaints of low back pain with referred pattern into the bilateral legs. The pain has been ongoing now for about 1 month, with acutely worsening symptoms over the past 1 week. The pain is constant 100% of the time and is very severe in the evening and night. The pain is shooting, pressure-like, dull aching and throbbing. The pain is made worse with standing and walking. pain is located in both sides of the low back / buttock. The patient cannot participate in recreational activities 2/2 to the severe pain and is not using any assistive device at this time. she has tried chiropractic manipulation without success. She has taken tylenol and motrin without success. \par \par 7/21/23: Today this patient is here for a follow up visit for her on going low back pain that started a couple months ago. We also reviewed in detail her X ray and MRI of the lumbar spine. She states this pain feels like it has worsened significantly. The pan is low back below the waist band with burning, numbness sensation down both thighs. Her right leg pain goes down lateral right thigh to the right knee however her left leg pain does not go to the left knee. The pain is worse when she stands up from a seated position. She states the pain is also worse when prolong walking, prolong standing and prolong sitting and to alleviate this pain she lays on the floor with legs up. She rates this pain 7/10 on the pain scale. \par \par She reports of taking the Medrol dose pack with no significant relief. \par \par Patient denies any bowel or bladder dysfunction, incontinence, or saddle anesthesia.\par

## 2023-08-14 ENCOUNTER — APPOINTMENT (OUTPATIENT)
Dept: PAIN MANAGEMENT | Facility: CLINIC | Age: 63
End: 2023-08-14

## 2023-08-23 ENCOUNTER — APPOINTMENT (OUTPATIENT)
Dept: PAIN MANAGEMENT | Facility: CLINIC | Age: 63
End: 2023-08-23

## 2023-08-28 ENCOUNTER — APPOINTMENT (OUTPATIENT)
Dept: PAIN MANAGEMENT | Facility: CLINIC | Age: 63
End: 2023-08-28
Payer: MEDICARE

## 2023-08-28 PROCEDURE — 93770 DETERMINATION VENOUS PRESS: CPT | Mod: 59

## 2023-08-28 PROCEDURE — 64483 NJX AA&/STRD TFRM EPI L/S 1: CPT | Mod: 50

## 2023-08-28 PROCEDURE — 94761 N-INVAS EAR/PLS OXIMETRY MLT: CPT | Mod: 59

## 2023-08-28 PROCEDURE — 93040 RHYTHM ECG WITH REPORT: CPT | Mod: 59

## 2023-08-28 NOTE — PROCEDURE
[FreeTextEntry3] : Date of Service: 08/28/2023   Account: [id number]  Patient: AMI SWIFT   YOB: 1960  Age: 63 year  Surgeon:      Malcolm Marshall DO  Assistant:    None  Pre-Operative Diagnosis:         Lumbar radiculopathy                  Post Operative Diagnosis:       Lumbar Radiculopathy                 Procedure:             Bilateral L4-L5 transforaminal epidural steroid injection under fluoroscopic guidance.  Anesthesia: MAC PATTI Chow  This procedure was carried out using fluoroscopic guidance.  The risks and benefits of the procedure were discussed extensively with the patient.  The consent of the patient was obtained and the following procedure was performed. The patient was placed in the prone position on the fluoroscopy table and the area was prepped and draped in a sterile fashion.  A timeout was performed with all essential staff present and the site and side were verified.  The right L4-L5 neural foramen was then identified on right oblique "rylan dog" anatomical view at the 6 o' clock position using fluoroscopic guidance, and the area was marked.   A 25 gauge 5 inch spinal needle was directed toward the inferior (6 o'clock) position of the pedicle, which formed the roof of the identified foramen.  Once in the epidural space, after negative aspiration for heme and CSF, 1cc of Omnipaque contrast was injected to confirm epidural location and assess filling defects and rule out intravascular needle placement.   Lumbar epidurogram showed no intravascular or intrathecal flow pattern.  No blood or CSF was aspirated.  Omnipaque spread medially in epidural space and outlined the exiting nerve root.  After this, 2.5cc of the following mixture (5cc of 3mL preservative free saline and 20mg decadron (2mL)) was injected in the epidural space.    Then attention was focused to the left L4-L5 neural foramen.  This was then identified on left oblique "rylan dog" anatomical view at the 6 o' clock position using fluoroscopic guidance, and the area was marked.  A 25 gauge 5 inch spinal needle was directed toward the inferior (6 o'clock) position of the pedicle, which formed the roof of the identified foramen.  Once in the epidural space, after negative aspiration for heme and CSF, 1cc of Omnipaque contrast was injected to confirm epidural location and assess filling defects and rule out intravascular needle placement.   The following contrast flow observed: no intravascular or intrathecal flow pattern was noted.  No blood or CSF was aspirated. Omnipaque spread medially in epidural space.    After this, the remainder of the injectate listed above was injected in the epidural space.  The needle was subsequently removed.  Vital signs remained normal.  Pulse oximeter was used throughout the procedure and the patient's pulse and oxygen saturation remained within normal limits.  The patient tolerated the procedure well.  There were no complications.  The patient was instructed to apply ice over the injection sites for twenty minutes every two hours for the next 24 to 48 hours.  Disposition:       1. The patient was advised to F/U in 1-2 weeks to assess the response to the injection.      2. The patient was also instructed to contact me immediately if there were any concerns related to the procedure performed.

## 2023-09-21 ENCOUNTER — APPOINTMENT (OUTPATIENT)
Dept: PAIN MANAGEMENT | Facility: CLINIC | Age: 63
End: 2023-09-21
Payer: COMMERCIAL

## 2023-09-21 VITALS — BODY MASS INDEX: 23.92 KG/M2 | WEIGHT: 130 LBS | HEIGHT: 62 IN

## 2023-09-21 DIAGNOSIS — M43.17 SPONDYLOLISTHESIS, LUMBOSACRAL REGION: ICD-10-CM

## 2023-09-21 DIAGNOSIS — M46.1 SACROILIITIS, NOT ELSEWHERE CLASSIFIED: ICD-10-CM

## 2023-09-21 DIAGNOSIS — M47.816 SPONDYLOSIS W/OUT MYELOPATHY OR RADICULOPATHY, LUMBAR REGION: ICD-10-CM

## 2023-09-21 DIAGNOSIS — M54.16 RADICULOPATHY, LUMBAR REGION: ICD-10-CM

## 2023-09-21 PROCEDURE — 99214 OFFICE O/P EST MOD 30 MIN: CPT

## 2023-09-22 NOTE — CONSULT NOTE ADULT - PROBLEM SELECTOR PROBLEM 4
Ground glass opacity present on imaging of lung

## 2023-09-24 PROBLEM — M47.816 LUMBAR SPONDYLOSIS: Status: ACTIVE | Noted: 2023-09-24

## 2023-09-24 PROBLEM — M43.17 ACQUIRED SPONDYLOLISTHESIS OF LUMBOSACRAL REGION: Status: ACTIVE | Noted: 2023-09-24

## 2023-09-24 PROBLEM — M54.16 LUMBAR RADICULOPATHY, CHRONIC: Status: ACTIVE | Noted: 2023-09-24

## 2023-10-13 ENCOUNTER — APPOINTMENT (OUTPATIENT)
Dept: PAIN MANAGEMENT | Facility: CLINIC | Age: 63
End: 2023-10-13
Payer: COMMERCIAL

## 2023-10-13 VITALS — WEIGHT: 130 LBS | BODY MASS INDEX: 23.92 KG/M2 | HEIGHT: 62 IN

## 2023-10-13 DIAGNOSIS — M62.830 MUSCLE SPASM OF BACK: ICD-10-CM

## 2023-10-13 PROCEDURE — 99214 OFFICE O/P EST MOD 30 MIN: CPT

## 2023-10-17 ENCOUNTER — EMERGENCY (EMERGENCY)
Facility: HOSPITAL | Age: 63
LOS: 0 days | Discharge: HOME | End: 2023-10-17
Attending: EMERGENCY MEDICINE
Payer: MEDICARE

## 2023-10-17 VITALS
HEIGHT: 62 IN | WEIGHT: 134.92 LBS | DIASTOLIC BLOOD PRESSURE: 86 MMHG | TEMPERATURE: 98 F | RESPIRATION RATE: 18 BRPM | HEART RATE: 86 BPM | OXYGEN SATURATION: 98 % | SYSTOLIC BLOOD PRESSURE: 124 MMHG

## 2023-10-17 DIAGNOSIS — M25.461 EFFUSION, RIGHT KNEE: ICD-10-CM

## 2023-10-17 DIAGNOSIS — J45.909 UNSPECIFIED ASTHMA, UNCOMPLICATED: ICD-10-CM

## 2023-10-17 DIAGNOSIS — E78.00 PURE HYPERCHOLESTEROLEMIA, UNSPECIFIED: ICD-10-CM

## 2023-10-17 DIAGNOSIS — Z98.890 OTHER SPECIFIED POSTPROCEDURAL STATES: Chronic | ICD-10-CM

## 2023-10-17 DIAGNOSIS — M25.561 PAIN IN RIGHT KNEE: ICD-10-CM

## 2023-10-17 DIAGNOSIS — S89.81XA OTHER SPECIFIED INJURIES OF RIGHT LOWER LEG, INITIAL ENCOUNTER: ICD-10-CM
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
## 2023-10-17 DIAGNOSIS — Z91.041 RADIOGRAPHIC DYE ALLERGY STATUS: ICD-10-CM

## 2023-10-17 DIAGNOSIS — E03.9 HYPOTHYROIDISM, UNSPECIFIED: ICD-10-CM

## 2023-10-17 DIAGNOSIS — M79.7 FIBROMYALGIA: ICD-10-CM

## 2023-10-17 DIAGNOSIS — F41.9 ANXIETY DISORDER, UNSPECIFIED: ICD-10-CM

## 2023-10-17 PROCEDURE — 73564 X-RAY EXAM KNEE 4 OR MORE: CPT | Mod: RT

## 2023-10-17 PROCEDURE — 99284 EMERGENCY DEPT VISIT MOD MDM: CPT | Mod: FS

## 2023-10-17 PROCEDURE — 99283 EMERGENCY DEPT VISIT LOW MDM: CPT | Mod: 25

## 2023-10-17 PROCEDURE — 73564 X-RAY EXAM KNEE 4 OR MORE: CPT | Mod: 26,RT

## 2023-10-17 NOTE — ED PROVIDER NOTE - OBJECTIVE STATEMENT
64 y/o female presents to the ED with right knee pain s/p fall at 1300 today. patient with increasing pain and swelling with difficulty ambulating and weight bearing due to pain . patient denies any back pain , head injury. no ankle or hip pain . patient with swelling and decreased rom

## 2023-10-17 NOTE — ED PROVIDER NOTE - CARE PLAN
1 Principal Discharge DX:	Left knee injury   Principal Discharge DX:	Pain of right knee after injury

## 2023-10-17 NOTE — ED ADULT TRIAGE NOTE - CHIEF COMPLAINT QUOTE
Detail Level: Detailed Depth Of Biopsy: dermis Was A Bandage Applied: Yes Size Of Lesion In Cm: 0.5 X Size Of Lesion In Cm: 0 Biopsy Type: H and E Biopsy Method: Dermablade Anesthesia Type: 1% lidocaine with epinephrine Pt states " I fell and hurt my Right knee/leg" Hemostasis: Aluminum Chloride and Electrocautery Wound Care: Petrolatum Dressing: bandage Destruction After The Procedure: No Type Of Destruction Used: Curettage Curettage Text: The wound bed was treated with curettage after the biopsy was performed. Cryotherapy Text: The wound bed was treated with cryotherapy after the biopsy was performed. Electrodesiccation Text: The wound bed was treated with electrodesiccation after the biopsy was performed. Electrodesiccation And Curettage Text: The wound bed was treated with electrodesiccation and curettage after the biopsy was performed. Silver Nitrate Text: The wound bed was treated with silver nitrate after the biopsy was performed. Lab: 6 Lab Facility: 3 Consent: Written consent was obtained and risks were reviewed including but not limited to scarring, infection, bleeding, scabbing, incomplete removal, nerve damage and allergy to anesthesia. Post-Care Instructions: I reviewed with the patient in detail post-care instructions. Patient is to keep the biopsy site dry overnight, and then apply Vaseline twice daily until healed. Notification Instructions: Patient will be notified of biopsy results. However, patient instructed to call the office if not contacted within 2 weeks. Billing Type: Third-Party Bill Information: Selecting Yes will display possible errors in your note based on the variables you have selected. This validation is only offered as a suggestion for you. PLEASE NOTE THAT THE VALIDATION TEXT WILL BE REMOVED WHEN YOU FINALIZE YOUR NOTE. IF YOU WANT TO FAX A PRELIMINARY NOTE YOU WILL NEED TO TOGGLE THIS TO 'NO' IF YOU DO NOT WANT IT IN YOUR FAXED NOTE.

## 2023-10-17 NOTE — ED PROVIDER NOTE - ATTENDING APP SHARED VISIT CONTRIBUTION OF CARE
63-year-old female presents for evaluation of right knee injury.  Patient states she fell earlier today.  Patient has been applying ice but is having increased pain and swelling.  On exam patient in NAD, AAOx3, positive swelling to right knee with effusion, positive tenderness, patient is unable to straight leg raise, skin is intact, ankle nontender

## 2023-10-17 NOTE — ED PROVIDER NOTE - PHYSICAL EXAMINATION
generalized: alert, no distress  eyes: clear conjunctiva  msk: right knee-  no bony deformity, good cap refill, pulses distally in tact +tenderness and stepoff to distal quadricep with decreased extension of knee   skin:  right knee - large effusion   neuro: alert, oriented, no motor or sensory deficits, gait steady

## 2023-10-17 NOTE — ED PROVIDER NOTE - NSFOLLOWUPINSTRUCTIONS_ED_ALL_ED_FT
Our Emergency Department Referral Coordinators will be reaching out to you in the next 24-48 hours from 9:00am to 5:00pm with a follow up appointment. Please expect a phone call from the hospital in that time frame. If you do not receive a call or if you have any questions or concerns, you can reach them at   [phone]    Knee Sprain    WHAT YOU NEED TO KNOW:    A knee sprain occurs when one or more ligaments in your knee are suddenly stretched or torn. Ligaments are tissues that hold bones together. Ligaments support the knee and keep the joint and bones in the correct position. Knee Anatomy          DISCHARGE INSTRUCTIONS:    Return to the emergency department if:     Any part of your leg feels cold, numb, or looks pale         Contact your healthcare provider if:     You have new or increased swelling, bruising, or pain in your knee.      Your symptoms do not improve within 6 weeks, even with treatment.      You have questions or concerns about your condition or care.     Medicines:     NSAIDs, such as ibuprofen, help decrease swelling, pain, and fever. This medicine is available with or without a doctor's order. NSAIDs can cause stomach bleeding or kidney problems in certain people. If you take blood thinner medicine, always ask your healthcare provider if NSAIDs are safe for you. Always read the medicine label and follow directions.      Acetaminophen decreases pain and fever. It is available without a doctor's order. Ask how much to take and how often to take it. Follow directions. Read the labels of all other medicines you are using to see if they also contain acetaminophen, or ask your doctor or pharmacist. Acetaminophen can cause liver damage if not taken correctly. Do not use more than 4 grams (4,000 milligrams) total of acetaminophen in one day.       Prescription pain medicine may be given. Ask how to take this medicine safely.       Take your medicine as directed. Contact your healthcare provider if you think your medicine is not helping or if you have side effects. Tell him or her if you are allergic to any medicine. Keep a list of the medicines, vitamins, and herbs you take. Include the amounts, and when and why you take them. Bring the list or the pill bottles to follow-up visits. Carry your medicine list with you in case of an emergency.    Self-care:     Rest your knee and do not exercise. You may be told to keep weight off your knee. This means that you should not walk on your injured leg. Rest helps decrease swelling and allows the injury to heal. You can do gentle range of motion (ROM) exercises as directed. This will prevent stiffness.       Apply ice on your knee for 15 to 20 minutes every hour or as directed. Use an ice pack, or put crushed ice in a plastic bag. Cover it with a towel. Ice helps prevent tissue damage and decreases swelling and pain.      Apply compression to your knee as directed. You may need to wear an elastic bandage. This helps keep your injured knee from moving too much while it heals. You can loosen or tighten the elastic bandage to make it comfortable. It should be tight enough for you to feel support. It should not be so tight that it causes your toes to feel numb or tingly. If you are wearing an elastic bandage, take it off and rewrap it once a day.       Elevate your knee above the level of your heart as often as you can. This will help decrease swelling and pain. Prop your leg on pillows or blankets to keep it elevated comfortably. Do not put pillows directly behind your knee.       Use support devices as directed: Support devices such as a splint or brace may be needed. These devices limit movement and protect your joint while it heals. You may be given crutches to use until you can stand on your injured leg without pain. Use devices as directed.     Physical therapy: A physical therapist teaches you exercises to help improve movement and strength, and to decrease pain.     Prevent another knee sprain: Exercise your legs to keep your muscles strong. Strong leg muscles help protect your knee and prevent strain. The following may also prevent a knee sprain:     Slowly start your exercise or training program. Slowly increase the time, distance, and intensity of your exercise. Sudden increases in training may cause you to injure your knee again.       Wear protective braces and equipment as directed. Braces may prevent your knee from moving the wrong way and causing another sprain. Protective equipment may support your bones and ligaments to prevent injury.       Warm up and stretch before exercise. Warm up by walking or using an exercise bike before starting your regular exercise. Do gentle stretches after warming up. This helps to loosen your muscles and decrease stress on your knee. Cool down and stretch after you exercise.       Wear shoes that fit correctly and support your feet. Replace your running or exercise shoes before the padding or shock absorption is worn out. Ask your healthcare provider which exercise shoes are best for you. Ask if you should wear special shoe inserts. Shoe inserts can help support your heels and arches or keep your foot lined up correctly in your shoes. Exercise on flat surfaces.    Follow up with your healthcare provider as directed: Write down your questions so you remember to ask them during your visits.        © Copyright [employer] 2019 All illustrations and images included in CareNotes are the copyrighted property of [employer].A[employer]., Inc. or [employer].

## 2023-10-17 NOTE — ED PROVIDER NOTE - CLINICAL SUMMARY MEDICAL DECISION MAKING FREE TEXT BOX
X-ray obtained and interpreted by me.  No acute fracture seen.  Positive effusion.  Results discussed with patient and family member.  Recommend RICE.  Ace wrap and knee immobilizer applied.  Patient will need to follow-up with orthopedics.

## 2023-10-17 NOTE — ED PROVIDER NOTE - NS ED ATTENDING STATEMENT MOD
This was a shared visit with the CAROLINE. I reviewed and verified the documentation and independently performed the documented:

## 2023-10-17 NOTE — ED PROVIDER NOTE - PATIENT PORTAL LINK FT
You can access the FollowMyHealth Patient Portal offered by [facility] by registering at the following website: [email or web address]. By joining [employer] [facility]’s FollowMyHealth portal, you will also be able to view your health information using other applications (apps) compatible with our system.

## 2023-10-19 ENCOUNTER — APPOINTMENT (OUTPATIENT)
Dept: ORTHOPEDIC SURGERY | Facility: CLINIC | Age: 63
End: 2023-10-19

## 2023-10-25 ENCOUNTER — APPOINTMENT (OUTPATIENT)
Dept: PAIN MANAGEMENT | Facility: CLINIC | Age: 63
End: 2023-10-25

## 2023-11-16 NOTE — PRE-ANESTHESIA EVALUATION ADULT - NSANTHDISPORD_GEN_ALL_CORE
Called patient around 1:30 and went to voicemail and called back now. Patient frustrated with court issues - would like another letter that gives more detail about her symptoms. PACU

## 2024-03-12 ENCOUNTER — NON-APPOINTMENT (OUTPATIENT)
Age: 64
End: 2024-03-12

## 2024-03-12 ENCOUNTER — APPOINTMENT (OUTPATIENT)
Dept: SURGERY | Facility: CLINIC | Age: 64
End: 2024-03-12
Payer: MEDICARE

## 2024-03-12 VITALS
OXYGEN SATURATION: 98 % | SYSTOLIC BLOOD PRESSURE: 134 MMHG | WEIGHT: 139 LBS | BODY MASS INDEX: 25.58 KG/M2 | TEMPERATURE: 97 F | HEART RATE: 76 BPM | DIASTOLIC BLOOD PRESSURE: 86 MMHG | HEIGHT: 62 IN

## 2024-03-12 DIAGNOSIS — Z98.890 OTHER SPECIFIED POSTPROCEDURAL STATES: ICD-10-CM

## 2024-03-12 DIAGNOSIS — Z80.0 FAMILY HISTORY OF MALIGNANT NEOPLASM OF DIGESTIVE ORGANS: ICD-10-CM

## 2024-03-12 PROCEDURE — 46600 DIAGNOSTIC ANOSCOPY SPX: CPT

## 2024-03-12 PROCEDURE — 99203 OFFICE O/P NEW LOW 30 MIN: CPT | Mod: 25

## 2024-04-01 PROBLEM — Z80.0 FAMILY HISTORY OF COLORECTAL CANCER: Status: ACTIVE | Noted: 2024-03-12

## 2024-04-01 PROBLEM — Z98.890 HISTORY OF SHOULDER SURGERY: Status: ACTIVE | Noted: 2024-03-12

## 2024-04-01 NOTE — HISTORY OF PRESENT ILLNESS
[FreeTextEntry1] : Patient is a 63-year-old female with a PMHx of asthma, hypothyroidism, fibromyalgia, PSHx of shoulder surgery, and carpal tunnel surgery, who presents for evaluation of hemorrhoids. Patient reports long-standing hemorrhoid symptoms dating back many years. She was previously told that she needed a hemorrhoidectomy but did not proceed due to pain concerns. She reports bowel movements daily with the use of a probiotic. She has daily prolapse and bleeding of her hemorrhoids. She denies recent fevers, chills, nausea, or vomiting. She has a family history of colon cancer in her mother, who was diagnosed in her 50s. Her last colonoscopy was in July 2021 with Dr. Crowell, that showed hemorrhoids and diverticulosis.

## 2024-04-01 NOTE — ADDENDUM
[FreeTextEntry1] : I, Francoise Alvarez ([facility]) assisted in filling out this chart under the dictation of Dr. Vamshi Gonzalez on 03/15/2024.

## 2024-04-01 NOTE — PROCEDURE
[FreeTextEntry1] : Digital rectal exam and anoscopy shows normal sphincter tone, large internal hemorrhoids, and no masses.

## 2024-04-01 NOTE — ASSESSMENT
[FreeTextEntry1] : 63-year-old female with bleeding grade 3 hemorrhoids.  I spoke to the patient regarding her diagnosis. I recommended a high-fiber diet, fiber supplementation, and increased water intake. Additionally, given the size and symptoms, I recommended hemorrhoid banding and possible surgery. Patient will follow up for our next available appointment for hemorrhoid banding.

## 2024-04-01 NOTE — PHYSICAL EXAM
[FreeTextEntry1] : General: Awake, Alert, No Acute Distress Respiratory: Normal Respiratory Effort Rectal: External examination shows no significant abnormalities.

## 2024-05-07 ENCOUNTER — APPOINTMENT (OUTPATIENT)
Dept: SURGERY | Facility: CLINIC | Age: 64
End: 2024-05-07

## 2024-05-07 ENCOUNTER — APPOINTMENT (OUTPATIENT)
Dept: SURGERY | Facility: CLINIC | Age: 64
End: 2024-05-07
Payer: MEDICARE

## 2024-05-07 VITALS
SYSTOLIC BLOOD PRESSURE: 140 MMHG | HEART RATE: 77 BPM | DIASTOLIC BLOOD PRESSURE: 88 MMHG | HEIGHT: 62 IN | OXYGEN SATURATION: 97 % | BODY MASS INDEX: 25.58 KG/M2 | TEMPERATURE: 97 F | WEIGHT: 139 LBS

## 2024-05-07 PROCEDURE — 46946 INT HRHC LIG 2+HROID W/O IMG: CPT

## 2024-05-23 NOTE — PHYSICAL EXAM
[FreeTextEntry1] : General: Awake Alert, No Acute Distress Respiratory: Normal Respiratory Effort Rectal: External examination shows no significant abnormalities.

## 2024-05-23 NOTE — ASSESSMENT
[FreeTextEntry1] : 64-year-old female with bleeding grade 3 hemorrhoids, s/p hemorrhoidal banding times 2.   I spoke to the patient about her condition. She will contact our office for complications regarding bleeding and infection. We will see her back in one month.

## 2024-05-23 NOTE — HISTORY OF PRESENT ILLNESS
[FreeTextEntry1] : Patient is a 64-year-old female with a PMHx of asthma, hypothyroidism, fibromyalgia, PSHx of shoulder surgery, and carpal tunnel surgery, who presents for hemorrhoid banding. Patient reports long-standing hemorrhoid symptoms dating back many years. She was previously told that she needed a hemorrhoidectomy but did not proceed due to pain concerns. She reports bowel movements daily with the use of a probiotic. She has daily prolapse and bleeding of her hemorrhoids. Her symptoms have not improved since her last office visit she presents today for hemorrhoid banding. She denies recent fevers, chills, nausea, or vomiting. She has a family history of colon cancer in her mother, who was diagnosed in her 50s. Her last colonoscopy was in July 2021 with Dr. Crowell, that showed hemorrhoids and diverticulosis.  SHARMIN

## 2024-05-23 NOTE — ADDENDUM
[FreeTextEntry1] : I, Francoise Alvarez ([facility]) assisted in filling out this chart under the dictation of Dr. Vamshi Gonzalez on 05/07/2024.

## 2024-05-23 NOTE — PROCEDURE
[FreeTextEntry1] : Digital rectal exam and anoscopy shows normal strength or tone, large internal hemorrhoids, and no masses.   I spoke to the patient about hemorrhoid banding. All risks, benefits, and alternatives were discussed. The patient expressed understanding and was in agreement with the plan.   The patient was placed in a prone jackknife position and an anoscope was used to isolate the left lateral and right posterior lateral hemorrhoids. These were large and friable with contact bleeding. Then using a disposable suction hemorrhoid , we placed one band in each of these locations that produced minor self-limited bleeding. Patient tolerated the procedure well without significant pain or vasovagal response.

## 2024-06-13 ENCOUNTER — APPOINTMENT (OUTPATIENT)
Dept: SURGERY | Facility: CLINIC | Age: 64
End: 2024-06-13

## 2024-06-13 VITALS
OXYGEN SATURATION: 99 % | DIASTOLIC BLOOD PRESSURE: 80 MMHG | HEIGHT: 62 IN | WEIGHT: 142 LBS | BODY MASS INDEX: 26.13 KG/M2 | SYSTOLIC BLOOD PRESSURE: 120 MMHG | HEART RATE: 105 BPM

## 2024-06-13 DIAGNOSIS — K64.2 THIRD DEGREE HEMORRHOIDS: ICD-10-CM

## 2024-06-13 PROCEDURE — 46600 DIAGNOSTIC ANOSCOPY SPX: CPT | Mod: 58

## 2024-06-19 PROBLEM — K64.2: Status: ACTIVE | Noted: 2024-03-15

## 2024-06-19 NOTE — PROCEDURE
[FreeTextEntry1] : Digital rectal exam and anoscopy shows normal sphincter tone, large hemorrhoids in the left lateral location and no masses.

## 2024-06-19 NOTE — ADDENDUM
[FreeTextEntry1] : I, Francoise Alvarez (Replaced by [facility]) assisted in filling out this chart under the dictation of Dr. Vamshi Gonzalez on 06/19/2024.

## 2024-06-19 NOTE — ASSESSMENT
[FreeTextEntry1] : 64-year-old female with bleeding grade 3 hemorrhoids, s/p hemorrhoid banding times 2.   I spoke to the patient about her condition. We discussed the possibility of recurrent banding for the left lateral location vs. conservative management. Patient will contact our office if she'd like to proceed with banding.

## 2024-06-19 NOTE — HISTORY OF PRESENT ILLNESS
[FreeTextEntry1] : Patient is a 64-year-old female with a PMHx of asthma, hypothyroidism, fibromyalgia, PSHx of shoulder surgery, and carpal tunnel surgery, who presents s/p hemorrhoid banding times two. Since the banding procedure, patient reports resolution of her bleeding and now only has minimal prolapse. Patient Patient reports daily bowel movements with the use of a probiotic. She denies recent fevers, chills, nausea, or vomiting. She has a family history of colon cancer in her mother, who was diagnosed in her 50s. Her last colonoscopy was in July 2021 with Dr. Crowell, that showed hemorrhoids and diverticulosis.

## 2024-09-09 ENCOUNTER — APPOINTMENT (OUTPATIENT)
Dept: NEUROSURGERY | Facility: CLINIC | Age: 64
End: 2024-09-09
Payer: MEDICARE

## 2024-09-09 VITALS — WEIGHT: 140 LBS | BODY MASS INDEX: 25.76 KG/M2 | HEIGHT: 62 IN

## 2024-09-09 DIAGNOSIS — G56.01 CARPAL TUNNEL SYNDROME, RIGHT UPPER LIMB: ICD-10-CM

## 2024-09-09 PROCEDURE — 99203 OFFICE O/P NEW LOW 30 MIN: CPT

## 2024-09-09 NOTE — HISTORY OF PRESENT ILLNESS
[de-identified] : Ms. SWIFT presents today for evaluation of right carpal tunnel syndrome.  She reports numbness and paresthesias in a median nerve distribution of the right hand.  Positive nocturnal symptoms. She has trialed conservative management including bracing and therapy exercises.   EMG from 12/2022 confirmed bilateral carpal tunnel syndrome mild on the left and moderate on the right.  It should be noted that she did undergo an endoscopic left carpal tunnel release and surgery for dequervains tenosynovitis by Dr. Severino a year ago. She reports minimal improvement postoperatively.  PHYSICAL EXAM:  Constitutional: Well appearing, no distress HEENT: Normocephalic Atraumatic Psychiatric: Alert and oriented to all spheres, normal mood Pulmonary: No respiratory distress Neurologic:  CN II-XII grossly intact Palpation: no pain to palpation  Strength: Full strength in all major muscle groups.  Sensation: Full sensation to light touch in all extremities, decreased sensation in the right hand in the median nerve distribution. Reflexes: Symmetric. Signs: Tinel's / Phalens positive - RIGHT.  Gait: steady, walking w/o assistance.

## 2024-09-09 NOTE — ASSESSMENT
[FreeTextEntry1] : Ms. SWIFT remains symptomatic from her right carpal tunnel syndrome.  She would like to proceed with a right carpal tunnel release.  She is aware that I do not perform endoscopic releases therefore this will be an open procedure. Risks, benefits, and alternatives were discussed at length including, but not limited to bleeding, infection, nerve damage, chronic neuropathy, recurrent carpal tunnel syndrome, and reflex sympathetic dystrophy. She is agreeable and has requested to proceed.     I personally reviewed with the PA, this patient's history and physical exam findings, as documented above. I have discussed the relevant areas of concern, having direct implications to the presenting problems and illnesses, and I have personally examined all pertinent and positive and negative findings, which impact their neurosurgical treatment.  MS GENIA DialloC Senior Physician Assistant [facility]    Caterina Briones MD FAANS Chair, Department of Neurosurgery [facility]

## 2024-11-04 ENCOUNTER — APPOINTMENT (OUTPATIENT)
Dept: ORTHOPEDIC SURGERY | Facility: CLINIC | Age: 64
End: 2024-11-04
Payer: MEDICARE

## 2024-11-04 DIAGNOSIS — G56.01 CARPAL TUNNEL SYNDROME, RIGHT UPPER LIMB: ICD-10-CM

## 2024-11-04 PROCEDURE — 99204 OFFICE O/P NEW MOD 45 MIN: CPT

## 2024-11-11 ENCOUNTER — APPOINTMENT (OUTPATIENT)
Dept: ORTHOPEDIC SURGERY | Facility: CLINIC | Age: 64
End: 2024-11-11
Payer: MEDICARE

## 2024-11-11 DIAGNOSIS — S83.231A COMPLEX TEAR OF MEDIAL MENISCUS, CURRENT INJURY, RIGHT KNEE, INITIAL ENCOUNTER: ICD-10-CM

## 2024-11-11 DIAGNOSIS — M94.20 CHONDROMALACIA, UNSPECIFIED SITE: ICD-10-CM

## 2024-11-11 PROCEDURE — 20611 DRAIN/INJ JOINT/BURSA W/US: CPT | Mod: RT

## 2024-11-11 PROCEDURE — 99204 OFFICE O/P NEW MOD 45 MIN: CPT | Mod: 25

## 2024-11-11 PROCEDURE — 73560 X-RAY EXAM OF KNEE 1 OR 2: CPT | Mod: RT

## 2024-11-20 ENCOUNTER — APPOINTMENT (OUTPATIENT)
Dept: ORTHOPEDIC SURGERY | Facility: CLINIC | Age: 64
End: 2024-11-20
Payer: MEDICARE

## 2024-11-20 DIAGNOSIS — G56.21 LESION OF ULNAR NERVE, RIGHT UPPER LIMB: ICD-10-CM

## 2024-11-20 DIAGNOSIS — G56.03 CARPAL TUNNEL SYNDROM,BILATERAL UPPER LIMBS: ICD-10-CM

## 2024-11-20 PROCEDURE — 99214 OFFICE O/P EST MOD 30 MIN: CPT

## 2024-12-11 ENCOUNTER — APPOINTMENT (OUTPATIENT)
Dept: ORTHOPEDIC SURGERY | Facility: CLINIC | Age: 64
End: 2024-12-11
Payer: MEDICARE

## 2024-12-11 DIAGNOSIS — S83.231A COMPLEX TEAR OF MEDIAL MENISCUS, CURRENT INJURY, RIGHT KNEE, INITIAL ENCOUNTER: ICD-10-CM

## 2024-12-11 PROCEDURE — 99213 OFFICE O/P EST LOW 20 MIN: CPT

## 2024-12-13 ENCOUNTER — APPOINTMENT (OUTPATIENT)
Dept: NEUROSURGERY | Facility: HOSPITAL | Age: 64
End: 2024-12-13

## 2024-12-26 ENCOUNTER — OUTPATIENT (OUTPATIENT)
Dept: OUTPATIENT SERVICES | Facility: HOSPITAL | Age: 64
LOS: 1 days | End: 2024-12-26
Payer: MEDICARE

## 2024-12-26 VITALS
OXYGEN SATURATION: 96 % | DIASTOLIC BLOOD PRESSURE: 80 MMHG | TEMPERATURE: 98 F | RESPIRATION RATE: 16 BRPM | WEIGHT: 136.03 LBS | SYSTOLIC BLOOD PRESSURE: 133 MMHG | HEIGHT: 62 IN | HEART RATE: 79 BPM

## 2024-12-26 DIAGNOSIS — Z01.818 ENCOUNTER FOR OTHER PREPROCEDURAL EXAMINATION: ICD-10-CM

## 2024-12-26 DIAGNOSIS — G56.01 CARPAL TUNNEL SYNDROME, RIGHT UPPER LIMB: ICD-10-CM

## 2024-12-26 DIAGNOSIS — Z98.890 OTHER SPECIFIED POSTPROCEDURAL STATES: Chronic | ICD-10-CM

## 2024-12-26 DIAGNOSIS — G56.21 LESION OF ULNAR NERVE, RIGHT UPPER LIMB: ICD-10-CM

## 2024-12-26 LAB
ALBUMIN SERPL ELPH-MCNC: 4.7 G/DL — SIGNIFICANT CHANGE UP (ref 3.5–5.2)
ALP SERPL-CCNC: 116 U/L — HIGH (ref 30–115)
ALT FLD-CCNC: 21 U/L — SIGNIFICANT CHANGE UP (ref 0–41)
ANION GAP SERPL CALC-SCNC: 7 MMOL/L — SIGNIFICANT CHANGE UP (ref 7–14)
AST SERPL-CCNC: 24 U/L — SIGNIFICANT CHANGE UP (ref 0–41)
BASOPHILS # BLD AUTO: 0.06 K/UL — SIGNIFICANT CHANGE UP (ref 0–0.2)
BASOPHILS NFR BLD AUTO: 0.7 % — SIGNIFICANT CHANGE UP (ref 0–1)
BILIRUB SERPL-MCNC: 0.9 MG/DL — SIGNIFICANT CHANGE UP (ref 0.2–1.2)
BUN SERPL-MCNC: 17 MG/DL — SIGNIFICANT CHANGE UP (ref 10–20)
CALCIUM SERPL-MCNC: 10.3 MG/DL — SIGNIFICANT CHANGE UP (ref 8.4–10.5)
CHLORIDE SERPL-SCNC: 108 MMOL/L — SIGNIFICANT CHANGE UP (ref 98–110)
CO2 SERPL-SCNC: 26 MMOL/L — SIGNIFICANT CHANGE UP (ref 17–32)
CREAT SERPL-MCNC: 0.6 MG/DL — LOW (ref 0.7–1.5)
EGFR: 100 ML/MIN/1.73M2 — SIGNIFICANT CHANGE UP
EOSINOPHIL # BLD AUTO: 0.14 K/UL — SIGNIFICANT CHANGE UP (ref 0–0.7)
EOSINOPHIL NFR BLD AUTO: 1.7 % — SIGNIFICANT CHANGE UP (ref 0–8)
GLUCOSE SERPL-MCNC: 88 MG/DL — SIGNIFICANT CHANGE UP (ref 70–99)
HCT VFR BLD CALC: 38.6 % — SIGNIFICANT CHANGE UP (ref 37–47)
HGB BLD-MCNC: 12.7 G/DL — SIGNIFICANT CHANGE UP (ref 12–16)
IMM GRANULOCYTES NFR BLD AUTO: 0.1 % — SIGNIFICANT CHANGE UP (ref 0.1–0.3)
LYMPHOCYTES # BLD AUTO: 2.11 K/UL — SIGNIFICANT CHANGE UP (ref 1.2–3.4)
LYMPHOCYTES # BLD AUTO: 25.9 % — SIGNIFICANT CHANGE UP (ref 20.5–51.1)
MCHC RBC-ENTMCNC: 31.6 PG — HIGH (ref 27–31)
MCHC RBC-ENTMCNC: 32.9 G/DL — SIGNIFICANT CHANGE UP (ref 32–37)
MCV RBC AUTO: 96 FL — SIGNIFICANT CHANGE UP (ref 81–99)
MONOCYTES # BLD AUTO: 0.62 K/UL — HIGH (ref 0.1–0.6)
MONOCYTES NFR BLD AUTO: 7.6 % — SIGNIFICANT CHANGE UP (ref 1.7–9.3)
NEUTROPHILS # BLD AUTO: 5.21 K/UL — SIGNIFICANT CHANGE UP (ref 1.4–6.5)
NEUTROPHILS NFR BLD AUTO: 64 % — SIGNIFICANT CHANGE UP (ref 42.2–75.2)
NRBC # BLD: 0 /100 WBCS — SIGNIFICANT CHANGE UP (ref 0–0)
PLATELET # BLD AUTO: 383 K/UL — SIGNIFICANT CHANGE UP (ref 130–400)
PMV BLD: 10.2 FL — SIGNIFICANT CHANGE UP (ref 7.4–10.4)
POTASSIUM SERPL-MCNC: 5.2 MMOL/L — HIGH (ref 3.5–5)
POTASSIUM SERPL-SCNC: 5.2 MMOL/L — HIGH (ref 3.5–5)
PROT SERPL-MCNC: 7.2 G/DL — SIGNIFICANT CHANGE UP (ref 6–8)
RBC # BLD: 4.02 M/UL — LOW (ref 4.2–5.4)
RBC # FLD: 11.9 % — SIGNIFICANT CHANGE UP (ref 11.5–14.5)
SODIUM SERPL-SCNC: 141 MMOL/L — SIGNIFICANT CHANGE UP (ref 135–146)
WBC # BLD: 8.15 K/UL — SIGNIFICANT CHANGE UP (ref 4.8–10.8)
WBC # FLD AUTO: 8.15 K/UL — SIGNIFICANT CHANGE UP (ref 4.8–10.8)

## 2024-12-26 PROCEDURE — 36415 COLL VENOUS BLD VENIPUNCTURE: CPT

## 2024-12-26 PROCEDURE — 85025 COMPLETE CBC W/AUTO DIFF WBC: CPT

## 2024-12-26 PROCEDURE — 93005 ELECTROCARDIOGRAM TRACING: CPT

## 2024-12-26 PROCEDURE — 93010 ELECTROCARDIOGRAM REPORT: CPT

## 2024-12-26 PROCEDURE — 99214 OFFICE O/P EST MOD 30 MIN: CPT | Mod: 25

## 2024-12-26 PROCEDURE — 80053 COMPREHEN METABOLIC PANEL: CPT

## 2024-12-26 RX ORDER — GABAPENTIN 300 MG/1
1 CAPSULE ORAL
Refills: 0 | DISCHARGE

## 2024-12-26 RX ORDER — FAMOTIDINE 20 MG/1
1 TABLET, FILM COATED ORAL
Refills: 0 | DISCHARGE

## 2024-12-26 NOTE — H&P PST ADULT - NSICDXPASTMEDICALHX_GEN_ALL_CORE_FT
PAST MEDICAL HISTORY:  Anxiety     Arrhythmia     Asthma     Fibromyalgia     High cholesterol     Hypothyroidism      PAST MEDICAL HISTORY:  Anxiety     Arrhythmia     Asthma     COVID-19 long hauler     Fibromyalgia     H/O carpal tunnel syndrome     H/O hyperthyroidism     High cholesterol     History of radiation therapy     Hypothyroidism

## 2024-12-26 NOTE — H&P PST ADULT - NSSUBSTANCEUSE_GEN_ALL_CORE_SD
Triage & Transition Services, Extended Care     Client Name: Myra Melo    Date: December 21, 2023    Patient was seen    Mode of Assessment:      Service Type: (P) other (see comments) (pt was sleeping)  Session Start Time:  (P) 1010    Session End Time: (P) 1040  Session Length: (P) 30  Site Location: [facility] EMERGENCY DEPT                             [id number]  Total Number ofAttendees: (P) 4  Topic:   (P) emotions/expression, coping skills/lifestyle management   Response: (P) other (see comments) (pt was sleeping)     Vane Smith [facility]   Licensed Mental Health Professional (LMHP), Extended Care  [phone]         never used

## 2024-12-26 NOTE — H&P PST ADULT - HISTORY OF PRESENT ILLNESS
64y Female presents today for presurgical testing for  RIGHT ENDOSCOPIC CARPAL TUNNEL RELEASE POSSIBLE OPEN, RIGHT CUBITAL TUNNEL RELEASE WITH TRANSPOSITION. Per Ortho note dated 11/20/24 "The patient is being seen today for right wrist pain. She has pain and numbness in her wrist to her hands, she has had a ctr on the left and believes she needs it on the right now. She has pain in her left thumb as well that shows some benefit....  We discussed the recommendation for cubital tunnel surgery"  Patient states above is true and accurate.   ?  Patient/guardian denies any CP, palpitations, SOB, cough, or dysuria. No recent URI or UTI.  Stated exercise tolerance is FOS  CARLEE screen reviewed    Patient/guardian denies any recent personal exposure to COVID19. Denies any sick contacts. Patient/guardian denies travel within the past 30 days. Patient was instructed to quarantine until after procedure.    Anesthesia Alert  NO--Difficult Airway  NO--History of neck surgery or radiation  NO--Limited ROM of neck  NO--History of Malignant hyperthermia  NO--Personal or family history of Pseudocholinesterase deficiency.  NO--Prior Anesthesia Complication  NO--Latex Allergy  NO--Loose teeth  NO--History of Rheumatoid Arthritis  NO--CARLEE  NO--Bleeding risk  NO--Other_____      Patient/guardian states that this is their complete medical history and list of medications.  Patient/guardian understands instructions given during this visit and was given the opportunity to ask questions and have them answered. They were instructed to follow up with their surgeon/surgeon's office with any questions regarding their procedure.   64y Female presents today for presurgical testing for  RIGHT ENDOSCOPIC CARPAL TUNNEL RELEASE POSSIBLE OPEN, RIGHT CUBITAL TUNNEL RELEASE WITH TRANSPOSITION. Per Ortho note dated 11/20/24 "The patient is being seen today for right wrist pain. She has pain and numbness in her wrist to her hands, she has had a ctr on the left and believes she needs it on the right now. She has pain in her left thumb as well that shows some benefit....  We discussed the recommendation for cubital tunnel surgery"  Patient states above is true and accurate. She adds that pain is 10/10 on pain scale when at it's worst, radiating to her fingertips causing numbness and tingling progressively worsening over the course of the past 1 year, associated with increasing weakness in her hand also causing disruption to her sleep at night which is only temporarily relieved with wearing of a splint at night. She reports that she is a retired , a job that required "a lot of typing". She offers no other complaints at this time, now for scheduled procedure.   ?  Patient/guardian denies any CP, palpitations, SOB, cough, or dysuria. No recent URI or UTI.  Stated exercise tolerance is FOS 3  CARLEE screen reviewed    Patient/guardian denies any recent personal exposure to COVID19. Denies any sick contacts. Patient/guardian denies travel within the past 30 days. Patient was instructed to quarantine until after procedure.    Anesthesia Alert  NO--Difficult Airway - Class II  YES--History of neck surgery or radiation - FOR HYPERTHYROIDISM  NO--Limited ROM of neck  NO--History of Malignant hyperthermia  NO--Personal or family history of Pseudocholinesterase deficiency.  NO--Prior Anesthesia Complication  NO--Latex Allergy  NO--Loose teeth  NO--History of Rheumatoid Arthritis  NO--CARLEE  NO--Bleeding risk  NO--Other_____    Duke Activity Status Index (DASI) from [employer])  on 12/26/2024  ** All calculations should be rechecked by clinician prior to use **    RESULT SUMMARY:  50.7 points  The higher the score (maximum 58.2), the higher the functional status.    8.97 METs        INPUTS:  Take care of self —> 2.75 = Yes  Walk indoors —> 1.75 = Yes  Walk 1&ndash;2 blocks on level ground —> 2.75 = Yes  Climb a flight of stairs or walk up a hill —> 5.5 = Yes  Run a short distance —> 8 = Yes  Do light work around the house —> 2.7 = Yes  Do moderate work around the house —> 3.5 = Yes  Do heavy work around the house —> 8 = Yes  Do yardwork —> 4.5 = Yes  Have sexual relations —> 5.25 = Yes  Participate in moderate recreational activities —> 6 = Yes  Participate in strenuous sports —> 0 = No    Revised Cardiac Risk Index for Pre-Operative Risk from [email or web address]  on 12/26/2024  ** All calculations should be rechecked by clinician prior to use **    RESULT SUMMARY:  0 points  RCRI Score    3.9 %  Risk of major cardiac event      INPUTS:  Elevated-risk surgery —> 0 = No  History of ischemic heart disease —> 0 = No  History of congestive heart failure —> 0 = No  History of cerebrovascular disease —> 0 = No  Pre-operative treatment with insulin —> 0 = No  Pre-operative creatinine >2 mg/dL / 176.8 µmol/L —> 0 = No    Patient/guardian states that this is their complete medical history and list of medications.  Patient/guardian understands instructions given during this visit and was given the opportunity to ask questions and have them answered. They were instructed to follow up with their surgeon/surgeon's office with any questions regarding their procedure.

## 2024-12-26 NOTE — H&P PST ADULT - REASON FOR ADMISSION
Case Type: OP Block TimeSuite: CASProceduralist: Zari Severino  Confirmed Surgery DateTime: 01- - 0:00PAST DateTime: 12- - 11:15Procedure: RIGHT ENDOSCOPIC CARPAL TUNNEL RELEASE POSSIBLE OPEN, RIGHT CUBITAL TUNNEL RELEASE WITH TRANSPOSITION  ERP?: UnavailableLaterality: RightLength of Procedure: 60 Minutes  Anesthesia Type: General

## 2024-12-27 DIAGNOSIS — Z01.818 ENCOUNTER FOR OTHER PREPROCEDURAL EXAMINATION: ICD-10-CM

## 2024-12-27 DIAGNOSIS — G56.01 CARPAL TUNNEL SYNDROME, RIGHT UPPER LIMB: ICD-10-CM

## 2025-01-09 ENCOUNTER — TRANSCRIPTION ENCOUNTER (OUTPATIENT)
Age: 65
End: 2025-01-09

## 2025-01-09 ENCOUNTER — APPOINTMENT (OUTPATIENT)
Dept: ORTHOPEDIC SURGERY | Facility: AMBULATORY SURGERY CENTER | Age: 65
End: 2025-01-09

## 2025-01-09 ENCOUNTER — OUTPATIENT (OUTPATIENT)
Dept: OUTPATIENT SERVICES | Facility: HOSPITAL | Age: 65
LOS: 1 days | Discharge: HOME | End: 2025-01-09
Payer: MEDICARE

## 2025-01-09 VITALS
OXYGEN SATURATION: 99 % | WEIGHT: 136.91 LBS | TEMPERATURE: 98 F | HEART RATE: 90 BPM | HEIGHT: 62 IN | SYSTOLIC BLOOD PRESSURE: 143 MMHG | DIASTOLIC BLOOD PRESSURE: 67 MMHG | RESPIRATION RATE: 18 BRPM

## 2025-01-09 VITALS
DIASTOLIC BLOOD PRESSURE: 70 MMHG | RESPIRATION RATE: 16 BRPM | SYSTOLIC BLOOD PRESSURE: 152 MMHG | HEART RATE: 78 BPM | OXYGEN SATURATION: 95 %

## 2025-01-09 DIAGNOSIS — Z98.890 OTHER SPECIFIED POSTPROCEDURAL STATES: Chronic | ICD-10-CM

## 2025-01-09 DIAGNOSIS — G56.01 CARPAL TUNNEL SYNDROME, RIGHT UPPER LIMB: ICD-10-CM

## 2025-01-09 DIAGNOSIS — G56.21 LESION OF ULNAR NERVE, RIGHT UPPER LIMB: ICD-10-CM

## 2025-01-09 PROCEDURE — 29848 WRIST ENDOSCOPY/SURGERY: CPT | Mod: RT

## 2025-01-09 PROCEDURE — 64718 REVISE ULNAR NERVE AT ELBOW: CPT | Mod: RT

## 2025-01-09 RX ORDER — ACETAMINOPHEN 80 MG/.8ML
1000 SOLUTION/ DROPS ORAL ONCE
Refills: 0 | Status: DISCONTINUED | OUTPATIENT
Start: 2025-01-09 | End: 2025-01-09

## 2025-01-09 RX ORDER — OXYCODONE HCL 15 MG
5 TABLET ORAL ONCE
Refills: 0 | Status: DISCONTINUED | OUTPATIENT
Start: 2025-01-09 | End: 2025-01-09

## 2025-01-09 RX ORDER — ONDANSETRON 4 MG/1
4 TABLET ORAL ONCE
Refills: 0 | Status: DISCONTINUED | OUTPATIENT
Start: 2025-01-09 | End: 2025-01-09

## 2025-01-09 RX ORDER — SODIUM CHLORIDE 9 MG/ML
1000 INJECTION, SOLUTION INTRAVENOUS
Refills: 0 | Status: DISCONTINUED | OUTPATIENT
Start: 2025-01-09 | End: 2025-01-09

## 2025-01-09 RX ORDER — HYDROMORPHONE HCL 4 MG
0.5 TABLET ORAL
Refills: 0 | Status: DISCONTINUED | OUTPATIENT
Start: 2025-01-09 | End: 2025-01-09

## 2025-01-09 RX ADMIN — Medication 0.5 MILLIGRAM(S): at 13:00

## 2025-01-09 RX ADMIN — Medication 5 MILLIGRAM(S): at 13:46

## 2025-01-09 RX ADMIN — SODIUM CHLORIDE 100 MILLILITER(S): 9 INJECTION, SOLUTION INTRAVENOUS at 12:57

## 2025-01-09 NOTE — BRIEF OPERATIVE NOTE - NSICDXBRIEFPROCEDURE_GEN_ALL_CORE_FT
PROCEDURES:  Endoscopic carpal tunnel release 09-Jan-2025 14:32:37 cubital tunnel release Zari Severino

## 2025-01-09 NOTE — CHART NOTE - NSCHARTNOTEFT_GEN_A_CORE
Anesthesia Post Op Assessment  		(    ) Intubated           TV _____	Rate _sv____	FiO2___4lnc__  		(  x  ) Patent airway. Full return of protective reflexes  		(  x  )Full recovery from anesthesia/sedation to baseline status      Cardiovascular Function:  		BP:	168/90	                  Pulse:		81                  RR:		12                  Temp:		97.8                  O2Sat:   99      Mental Status:  	        (  x  ) awake		  (  x  ) alert		 (    ) drowsy	               (    ) sedated      Nausea/Vomiting:  		(    ) Yes, See post-op orders		   (  x  ) No      Pain Scale: (0-10):			Treatment:     (    ) None	            (  x  ) See Post-Op/PCA Orders      Post-operative Fluids: 	   (    ) Oral	          (  x  ) See post-op Orders        Comments:    Uneventful. No complications from anesthesia.  Discharge when criteria met.

## 2025-01-09 NOTE — BRIEF OPERATIVE NOTE - NSICDXBRIEFPREOP_GEN_ALL_CORE_FT
PRE-OP DIAGNOSIS:  Cubital tunnel syndrome, right 09-Jan-2025 14:34:59  Zari Severino  Right carpal tunnel syndrome 09-Jan-2025 14:34:03  Zari Severino

## 2025-01-09 NOTE — BRIEF OPERATIVE NOTE - NSICDXBRIEFPOSTOP_GEN_ALL_CORE_FT
POST-OP DIAGNOSIS:  Right carpal tunnel syndrome 09-Jan-2025 14:35:11  Zari Severino  Cubital tunnel syndrome, right 09-Jan-2025 14:35:05  Zari Severino

## 2025-01-09 NOTE — ASU DISCHARGE PLAN (ADULT/PEDIATRIC) - CARE PROVIDER_API CALL
Zari Severino [facility]  Orthopaedic Surgery  [address] [place] [address]  Phone: [phone]  Fax: [phone]  Follow Up Time:

## 2025-01-09 NOTE — ASU DISCHARGE PLAN (ADULT/PEDIATRIC) - FINANCIAL ASSISTANCE
[facility] provides services at a reduced cost to those who are determined to be eligible through [facility]’s financial assistance program. Information regarding [facility]’s financial assistance program can be found by going to [email or web address] or by calling [phone].

## 2025-01-09 NOTE — ASU DISCHARGE PLAN (ADULT/PEDIATRIC) - NS MD DC FALL RISK RISK
For information on Fall & Injury Prevention, visit: [email or web address] OR  [email or web address] OR  https://www.cdc.gov/steadi/patient.html

## 2025-01-09 NOTE — ASU DISCHARGE PLAN (ADULT/PEDIATRIC) - ASU DC SPECIAL INSTRUCTIONSFT
DIET:    Resume normal diet  No alcoholic  beverages for 24 hours or if on prescribed narcotic pain medications.    MEDICATION:    Resume your preoperative oral medications.  Check with your physician before starting aspirin, Coumadin, or other blood thinners.  Prescriptions given to you - take as directed.      ACTIVITY:    Rest today and limit your activities for 24 hours.  Do not drive or operate machinery for 24 hours - if you received anesthesia.  When taking pain medication, be careful as you walk or climb stairs.  It is not advisable to drive while taking prescribed pain medication.    SPECIAL INSTRUCTIONS:    __x___ Elevate operative site above heart level or as directed.  _____ Apply ice to operative site as directed.  _____ Use  sling as directed.  __x___ Exercise fingers.    DRESSING CARE:    __x___ You may change the dressing 7 days. Keep wound covered with band-aids.  _____ Do not change the dressing until your doctor see you.  __x___ You can loosen or rewrap the dressing.  __x___  Keep dressing clean and dry.  __x___ You may shower in _1____ day(s) with the extremity covered by a plastic bag.  _____ OK to wash hand , including showers, in _____ day(s).    ADDITIONAL  INFORMATION:    Post operative visit should be scheduled for next week.  If you are not aware of visit please contact office.  If you have any questions or concerns call office at      Notify your doctor if you develop   Fever  Excessive Swelling  Chills   Drainage   Pain not controlled by medication  Persistent numbness in hand or fingers    If an Emergency arises call 911 and/or go to the Emergency Room

## 2025-01-12 DIAGNOSIS — F41.9 ANXIETY DISORDER, UNSPECIFIED: ICD-10-CM

## 2025-01-12 DIAGNOSIS — E03.9 HYPOTHYROIDISM, UNSPECIFIED: ICD-10-CM

## 2025-01-12 DIAGNOSIS — G56.21 LESION OF ULNAR NERVE, RIGHT UPPER LIMB: ICD-10-CM

## 2025-01-12 DIAGNOSIS — G56.01 CARPAL TUNNEL SYNDROME, RIGHT UPPER LIMB: ICD-10-CM

## 2025-01-12 DIAGNOSIS — J45.909 UNSPECIFIED ASTHMA, UNCOMPLICATED: ICD-10-CM

## 2025-01-12 DIAGNOSIS — Z91.041 RADIOGRAPHIC DYE ALLERGY STATUS: ICD-10-CM

## 2025-01-12 DIAGNOSIS — Z92.3 PERSONAL HISTORY OF IRRADIATION: ICD-10-CM

## 2025-01-12 DIAGNOSIS — E78.00 PURE HYPERCHOLESTEROLEMIA, UNSPECIFIED: ICD-10-CM

## 2025-01-20 ENCOUNTER — APPOINTMENT (OUTPATIENT)
Dept: ORTHOPEDIC SURGERY | Facility: CLINIC | Age: 65
End: 2025-01-20

## 2025-01-21 ENCOUNTER — APPOINTMENT (OUTPATIENT)
Dept: ORTHOPEDIC SURGERY | Facility: CLINIC | Age: 65
End: 2025-01-21

## 2025-01-21 DIAGNOSIS — G56.21 LESION OF ULNAR NERVE, RIGHT UPPER LIMB: ICD-10-CM

## 2025-01-21 DIAGNOSIS — G56.03 CARPAL TUNNEL SYNDROM,BILATERAL UPPER LIMBS: ICD-10-CM

## 2025-01-21 PROBLEM — Z86.39 PERSONAL HISTORY OF OTHER ENDOCRINE, NUTRITIONAL AND METABOLIC DISEASE: Chronic | Status: ACTIVE | Noted: 2024-12-26

## 2025-01-21 PROBLEM — Z86.69 PERSONAL HISTORY OF OTHER DISEASES OF THE NERVOUS SYSTEM AND SENSE ORGANS: Chronic | Status: ACTIVE | Noted: 2024-12-26

## 2025-01-21 PROBLEM — Z92.3 PERSONAL HISTORY OF IRRADIATION: Chronic | Status: ACTIVE | Noted: 2024-12-26

## 2025-01-21 PROBLEM — U09.9 POST COVID-19 CONDITION, UNSPECIFIED: Chronic | Status: ACTIVE | Noted: 2024-12-26

## 2025-01-21 PROCEDURE — 99024 POSTOP FOLLOW-UP VISIT: CPT

## 2025-02-27 ENCOUNTER — APPOINTMENT (OUTPATIENT)
Dept: ORTHOPEDIC SURGERY | Facility: CLINIC | Age: 65
End: 2025-02-27